# Patient Record
Sex: FEMALE | Race: WHITE | NOT HISPANIC OR LATINO | Employment: FULL TIME | URBAN - METROPOLITAN AREA
[De-identification: names, ages, dates, MRNs, and addresses within clinical notes are randomized per-mention and may not be internally consistent; named-entity substitution may affect disease eponyms.]

---

## 2017-03-09 ENCOUNTER — HOSPITAL ENCOUNTER (OUTPATIENT)
Dept: OTHER | Facility: HOSPITAL | Age: 42
Setting detail: SPECIMEN
Discharge: HOME | End: 2017-03-09
Attending: NURSE PRACTITIONER | Admitting: NURSE PRACTITIONER

## 2017-10-19 ENCOUNTER — HOSPITAL ENCOUNTER (OUTPATIENT)
Dept: CARDIOLOGY | Facility: HOSPITAL | Age: 42
Discharge: HOME | End: 2017-10-19
Attending: INTERNAL MEDICINE | Admitting: INTERNAL MEDICINE

## 2018-01-02 ENCOUNTER — HOSPITAL ENCOUNTER (OUTPATIENT)
Dept: CARDIOLOGY | Facility: HOSPITAL | Age: 43
Discharge: HOME | End: 2018-01-02
Attending: INTERNAL MEDICINE | Admitting: INTERNAL MEDICINE

## 2019-03-14 ENCOUNTER — HOSPITAL ENCOUNTER (OUTPATIENT)
Dept: LAB | Facility: HOSPITAL | Age: 44
Discharge: HOME | End: 2019-03-14
Attending: NURSE PRACTITIONER | Admitting: NURSE PRACTITIONER

## 2019-03-14 LAB
ANION GAP SERPL CALC-SCNC: 13.8 MMOL/L (ref 10–20)
BUN SERPL-MCNC: 14 MG/DL (ref 8–20)
BUN/CREAT SERPL: 17.5 (ref 5.4–26.2)
CALCIUM SERPL-MCNC: 9.6 MG/DL (ref 8.9–10.3)
CHLORIDE SERPL-SCNC: 98 MMOL/L (ref 101–111)
CONV CO2: 26 MMOL/L (ref 22–32)
CREAT UR-MCNC: 0.8 MG/DL (ref 0.4–1)
GLUCOSE SERPL-MCNC: 106 MG/DL (ref 65–99)
POTASSIUM SERPL-SCNC: 3.8 MMOL/L (ref 3.6–5.1)
SODIUM SERPL-SCNC: 134 MMOL/L (ref 136–144)

## 2019-08-01 ENCOUNTER — OFFICE VISIT (OUTPATIENT)
Dept: FAMILY MEDICINE CLINIC | Facility: CLINIC | Age: 44
End: 2019-08-01

## 2019-08-01 VITALS
HEART RATE: 90 BPM | RESPIRATION RATE: 12 BRPM | DIASTOLIC BLOOD PRESSURE: 82 MMHG | BODY MASS INDEX: 41.78 KG/M2 | WEIGHT: 243.4 LBS | SYSTOLIC BLOOD PRESSURE: 138 MMHG

## 2019-08-01 DIAGNOSIS — R60.9 PERIPHERAL EDEMA: Primary | ICD-10-CM

## 2019-08-01 DIAGNOSIS — E66.01 CLASS 3 SEVERE OBESITY DUE TO EXCESS CALORIES WITH SERIOUS COMORBIDITY AND BODY MASS INDEX (BMI) OF 40.0 TO 44.9 IN ADULT (HCC): ICD-10-CM

## 2019-08-01 DIAGNOSIS — I10 ESSENTIAL HYPERTENSION: ICD-10-CM

## 2019-08-01 PROBLEM — E78.5 HYPERLIPIDEMIA: Status: ACTIVE | Noted: 2017-10-30

## 2019-08-01 PROBLEM — R94.39 ABNORMAL CARDIOVASCULAR STRESS TEST: Status: ACTIVE | Noted: 2017-10-30

## 2019-08-01 PROBLEM — R60.0 PERIPHERAL EDEMA: Status: ACTIVE | Noted: 2019-03-14

## 2019-08-01 PROBLEM — F41.9 ANXIETY: Status: ACTIVE | Noted: 2017-10-30

## 2019-08-01 PROBLEM — G47.33 OBSTRUCTIVE SLEEP APNEA: Status: ACTIVE | Noted: 2019-04-10

## 2019-08-01 PROBLEM — E66.813 CLASS 3 SEVERE OBESITY DUE TO EXCESS CALORIES WITH SERIOUS COMORBIDITY IN ADULT: Status: ACTIVE | Noted: 2019-08-01

## 2019-08-01 PROCEDURE — 99213 OFFICE O/P EST LOW 20 MIN: CPT | Performed by: FAMILY MEDICINE

## 2019-08-01 RX ORDER — OMEPRAZOLE 20 MG/1
TABLET, DELAYED RELEASE ORAL EVERY 24 HOURS
COMMUNITY
Start: 2019-04-10 | End: 2019-08-01

## 2019-08-01 RX ORDER — ERGOCALCIFEROL 1.25 MG/1
CAPSULE ORAL
Refills: 0 | COMMUNITY
Start: 2019-06-05 | End: 2019-08-01

## 2019-08-01 RX ORDER — ARIPIPRAZOLE 20 MG/1
TABLET ORAL EVERY 24 HOURS
COMMUNITY
Start: 2012-06-02 | End: 2020-01-27

## 2019-08-01 RX ORDER — TRIAMCINOLONE ACETONIDE 55 UG/1
SPRAY, METERED NASAL
Refills: 2 | COMMUNITY
Start: 2019-05-14 | End: 2019-08-01

## 2019-08-01 RX ORDER — ATORVASTATIN CALCIUM 20 MG/1
TABLET, FILM COATED ORAL EVERY 24 HOURS
COMMUNITY
Start: 2018-01-18 | End: 2020-01-09

## 2019-08-01 RX ORDER — BROMPHENIRAMINE MALEATE, PSEUDOEPHEDRINE HYDROCHLORIDE, AND DEXTROMETHORPHAN HYDROBROMIDE 2; 30; 10 MG/5ML; MG/5ML; MG/5ML
SYRUP ORAL
Refills: 0 | COMMUNITY
Start: 2019-05-14 | End: 2019-08-01

## 2019-08-01 RX ORDER — CLONAZEPAM 0.5 MG/1
TABLET ORAL
Refills: 5 | COMMUNITY
Start: 2019-04-29

## 2019-08-01 RX ORDER — ALBUTEROL SULFATE 90 UG/1
AEROSOL, METERED RESPIRATORY (INHALATION)
Refills: 0 | COMMUNITY
Start: 2019-05-15 | End: 2019-08-01

## 2019-08-01 RX ORDER — FUROSEMIDE 20 MG/1
TABLET ORAL EVERY 24 HOURS
COMMUNITY
Start: 2019-04-23 | End: 2019-08-01

## 2019-08-01 RX ORDER — POTASSIUM CHLORIDE 750 MG/1
1 CAPSULE, EXTENDED RELEASE ORAL EVERY 24 HOURS
COMMUNITY
Start: 2019-04-23 | End: 2019-08-01

## 2019-08-01 RX ORDER — CLOMIPRAMINE HYDROCHLORIDE 50 MG/1
CAPSULE ORAL
COMMUNITY
Start: 2012-06-02 | End: 2021-02-08 | Stop reason: SDUPTHER

## 2019-08-01 RX ORDER — LEVOTHYROXINE SODIUM 0.07 MG/1
TABLET ORAL EVERY 24 HOURS
COMMUNITY
Start: 2019-04-10 | End: 2020-05-27

## 2019-08-01 RX ORDER — LAMOTRIGINE 100 MG/1
TABLET ORAL EVERY 24 HOURS
COMMUNITY
Start: 2012-06-02 | End: 2020-10-16

## 2019-08-01 RX ORDER — OXCARBAZEPINE 600 MG/1
600 TABLET, FILM COATED ORAL 2 TIMES DAILY
COMMUNITY
Start: 2018-01-18 | End: 2021-12-08

## 2019-08-01 RX ORDER — LOSARTAN POTASSIUM 100 MG/1
TABLET ORAL EVERY 24 HOURS
COMMUNITY
Start: 2019-04-10 | End: 2019-12-30 | Stop reason: SDUPTHER

## 2019-08-01 NOTE — PATIENT INSTRUCTIONS
Exercising to Stay Healthy  To become healthy and stay healthy, it is recommended that you do moderate-intensity and vigorous-intensity exercise. You can tell that you are exercising at a moderate intensity if your heart starts beating faster and you start breathing faster but can still hold a conversation. You can tell that you are exercising at a vigorous intensity if you are breathing much harder and faster and cannot hold a conversation while exercising.  Exercising regularly is important. It has many health benefits, such as:  · Improving overall fitness, flexibility, and endurance.  · Increasing bone density.  · Helping with weight control.  · Decreasing body fat.  · Increasing muscle strength.  · Reducing stress and tension.  · Improving overall health.  How often should I exercise?  Choose an activity that you enjoy, and set realistic goals. Your health care provider can help you make an activity plan that works for you.  Exercise regularly as told by your health care provider. This may include:  · Doing strength training two times a week, such as:  ? Lifting weights.  ? Using resistance bands.  ? Push-ups.  ? Sit-ups.  ? Yoga.  · Doing a certain intensity of exercise for a given amount of time. Choose from these options:  ? A total of 150 minutes of moderate-intensity exercise every week.  ? A total of 75 minutes of vigorous-intensity exercise every week.  ? A mix of moderate-intensity and vigorous-intensity exercise every week.  Children, pregnant women, people who have not exercised regularly, people who are overweight, and older adults may need to talk with a health care provider about what activities are safe to do. If you have a medical condition, be sure to talk with your health care provider before you start a new exercise program.  What are some exercise ideas?  Moderate-intensity exercise ideas include:  · Walking 1 mile (1.6 km) in about 15  minutes.  · Biking.  · Hiking.  · Golfing.  · Dancing.  · Water aerobics.  Vigorous-intensity exercise ideas include:  · Walking 4.5 miles (7.2 km) or more in about 1 hour.  · Jogging or running 5 miles (8 km) in about 1 hour.  · Biking 10 miles (16.1 km) or more in about 1 hour.  · Lap swimming.  · Roller-skating or in-line skating.  · Cross-country skiing.  · Vigorous competitive sports, such as football, basketball, and soccer.  · Jumping rope.  · Aerobic dancing.  What are some everyday activities that can help me to get exercise?  · Yard work, such as:  ? Pushing a .  ? Raking and bagging leaves.  · Washing your car.  · Pushing a stroller.  · Shoveling snow.  · Gardening.  · Washing windows or floors.  How can I be more active in my day-to-day activities?  · Use stairs instead of an elevator.  · Take a walk during your lunch break.  · If you drive, park your car farther away from your work or school.  · If you take public transportation, get off one stop early and walk the rest of the way.  · Stand up or walk around during all of your indoor phone calls.  · Get up, stretch, and walk around every 30 minutes throughout the day.  · Enjoy exercise with a friend. Support to continue exercising will help you keep a regular routine of activity.  What guidelines can I follow while exercising?  · Before you start a new exercise program, talk with your health care provider.  · Do not exercise so much that you hurt yourself, feel dizzy, or get very short of breath.  · Wear comfortable clothes and wear shoes with good support.  · Drink plenty of water while you exercise to prevent dehydration or heat stroke.  · Work out until your breathing and your heartbeat get faster.  Where to find more information  · [place] Department of Health and Human Services: www.hhs.gov  · Centers for Disease Control and Prevention (CDC): www.cdc.gov  Summary  · Exercising regularly is important. It will improve your overall fitness,  flexibility, and endurance.  · Regular exercise also will improve your overall health. It can help you control your weight, reduce stress, and improve your bone density.  · Do not exercise so much that you hurt yourself, feel dizzy, or get very short of breath.  · Before you start a new exercise program, talk with your health care provider.  This information is not intended to replace advice given to you by your health care provider. Make sure you discuss any questions you have with your health care provider.  Document Released: 01/20/2012 Document Revised: 11/08/2018 Document Reviewed: 11/08/2018  [employer] Interactive Patient Education © 2019 [employer]    Mediterranean Diet  A Mediterranean diet refers to food and lifestyle choices that are based on the traditions of countries located on the [place]. This way of eating has been shown to help prevent certain conditions and improve outcomes for people who have chronic diseases, like kidney disease and heart disease.  What are tips for following this plan?  Lifestyle  · Cook and eat meals together with your family, when possible.  · Drink enough fluid to keep your urine clear or pale yellow.  · Be physically active every day. This includes:  ? Aerobic exercise like running or swimming.  ? Leisure activities like gardening, walking, or housework.  · Get 7-8 hours of sleep each night.  · If recommended by your health care provider, drink red wine in moderation. This means 1 glass a day for nonpregnant women and 2 glasses a day for men. A glass of wine equals 5 oz (150 mL).  Reading food labels    · Check the serving size of packaged foods. For foods such as rice and pasta, the serving size refers to the amount of cooked product, not dry.  · Check the total fat in packaged foods. Avoid foods that have saturated fat or trans fats.  · Check the ingredients list for added sugars, such as corn syrup.  Shopping  · At the grocery store, buy most of your food from  the areas near the walls of the store. This includes:  ? Fresh fruits and vegetables (produce).  ? Grains, beans, nuts, and seeds. Some of these may be available in unpackaged forms or large amounts (in bulk).  ? Fresh seafood.  ? Poultry and eggs.  ? Low-fat dairy products.  · Buy whole ingredients instead of prepackaged foods.  · Buy fresh fruits and vegetables in-season from local farmers markets.  · Buy frozen fruits and vegetables in resealable bags.  · If you do not have access to quality fresh seafood, buy precooked frozen shrimp or canned fish, such as tuna, salmon, or sardines.  · Buy small amounts of raw or cooked vegetables, salads, or olives from the deli or salad bar at your store.  · Stock your pantry so you always have certain foods on hand, such as olive oil, canned tuna, canned tomatoes, rice, pasta, and beans.  Cooking  · Cook foods with extra-virgin olive oil instead of using butter or other vegetable oils.  · Have meat as a side dish, and have vegetables or grains as your main dish. This means having meat in small portions or adding small amounts of meat to foods like pasta or stew.  · Use beans or vegetables instead of meat in common dishes like chili or lasagna.  · [place] with different cooking methods. Try roasting or broiling vegetables instead of steaming or sautéeing them.  · Add frozen vegetables to soups, stews, pasta, or rice.  · Add nuts or seeds for added healthy fat at each meal. You can add these to yogurt, salads, or vegetable dishes.  · Marinate fish or vegetables using olive oil, lemon juice, garlic, and fresh herbs.  Meal planning    · Plan to eat 1 vegetarian meal one day each week. Try to work up to 2 vegetarian meals, if possible.  · Eat seafood 2 or more times a week.  · Have healthy snacks readily available, such as:  ? Vegetable sticks with hummus.  ? Greek yogurt.  ? Fruit and nut trail mix.  · Eat balanced meals throughout the week. This includes:  ? Fruit: 2-3  servings a day  ? Vegetables: 4-5 servings a day  ? Low-fat dairy: 2 servings a day  ? Fish, poultry, or lean meat: 1 serving a day  ? Beans and legumes: 2 or more servings a week  ? Nuts and seeds: 1-2 servings a day  ? Whole grains: 6-8 servings a day  ? Extra-virgin olive oil: 3-4 servings a day  · Limit red meat and sweets to only a few servings a month  What are my food choices?  · Mediterranean diet  ? Recommended  ? Grains: Whole-grain pasta. Brown rice. Bulgar wheat. Polenta. Couscous. Whole-wheat bread. Oatmeal. Quinoa.  ? Vegetables: Artichokes. Beets. Broccoli. Cabbage. Carrots. Eggplant. Green beans. Chard. Kale. Spinach. Onions. Leeks. Peas. Squash. Tomatoes. Peppers. Radishes.  ? Fruits: Apples. Apricots. Avocado. Berries. Bananas. Cherries. Dates. Figs. Grapes. [place]. Melon. Oranges. Peaches. Plums. Pomegranate.  ? Meats and other protein foods: Beans. Almonds. Sunflower seeds. Pine nuts. Peanuts. Cod. [place]. Scallops. Shrimp. Tuna. Tilapia. Clams. Oysters. Eggs.  ? Dairy: Low-fat milk. Cheese. Greek yogurt.  ? Beverages: Water. Red wine. Herbal tea.  ? Fats and oils: Extra virgin olive oil. Avocado oil. Grape seed oil.  ? Sweets and desserts: Greek yogurt with honey. Baked apples. Poached pears. Trail mix.  ? Seasoning and other foods: Basil. Cilantro. Coriander. Cumin. Mint. Parsley. [place]. Rosemary. Tarragon. Garlic. Oregano. Thyme. Pepper. Balsalmic vinegar. Tahini. Hummus. Tomato sauce. Olives. Mushrooms.  ? Limit these  ? Grains: Prepackaged pasta or rice dishes. Prepackaged cereal with added sugar.  ? Vegetables: Deep fried potatoes (french fries).  ? Fruits: Fruit canned in syrup.  ? Meats and other protein foods: Beef. Pork. Lamb. Poultry with skin. Hot dogs. Kim.  ? Dairy: Ice cream. Sour cream. Whole milk.  ? Beverages: Juice. Sugar-sweetened soft drinks. Beer. Liquor and spirits.  ? Fats and oils: Butter. Canola oil. Vegetable oil. Beef fat (tallow). Lard.  ? Sweets and desserts:  Cookies. Cakes. Pies. Candy.  ? Seasoning and other foods: Mayonnaise. Premade sauces and marinades.  ? The items listed may not be a complete list. Talk with your dietitian about what dietary choices are right for you.  Summary  · The Mediterranean diet includes both food and lifestyle choices.  · Eat a variety of fresh fruits and vegetables, beans, nuts, seeds, and whole grains.  · Limit the amount of red meat and sweets that you eat.  · Talk with your health care provider about whether it is safe for you to drink red wine in moderation. This means 1 glass a day for nonpregnant women and 2 glasses a day for men. A glass of wine equals 5 oz (150 mL).  This information is not intended to replace advice given to you by your health care provider. Make sure you discuss any questions you have with your health care provider.  Document Released: 08/10/2017 Document Revised: 09/12/2017 Document Reviewed: 08/10/2017  [employer] Interactive Patient Education © 2019 [employer]

## 2019-08-01 NOTE — ASSESSMENT & PLAN NOTE
Hypertension is improving with treatment.  Continue current treatment regimen.  Dietary sodium restriction.  Weight loss.  Continue current medications.  Blood pressure will be reassessed at the next regular appointment.

## 2019-08-01 NOTE — PROGRESS NOTES
Rooming Tab(CC,VS,Pt Hx,Fall Screen)  Chief Complaint   Patient presents with   • Hypertension   • Hyperlipidemia       Subjective Patient is here for follow-up of her edema.  20 mg of Lasix did not help her at all.  She wore compression socks knee-high and it seemed to help some but she got some pockets of fluid around part of her legs.  She denies orthopnea or PND.  She wears her CPAP but remains fatigued.  She denies any chest pain.  She is anxious.  Her psychiatrist is adjusting her medicines to Vraylar and weaning her off of Abilify.    I have reviewed and updated her medications, medical history and problem list during today's office visit.     Patient Care Team:  Salvador Connelly MD as PCP - General    Problem List Tab  Medications Tab  Synopsis Tab  Chart Review Tab  Care Everywhere Tab  Immunizations Tab  Patient History Tab    Social History     Tobacco Use   • Smoking status: Never Smoker   • Smokeless tobacco: Never Used   Substance Use Topics   • Alcohol use: Yes     Frequency: Monthly or less       Review of Systems   Constitutional: Positive for fatigue. Negative for chills and fever.   HENT: Positive for nosebleeds.    Respiratory: Negative for chest tightness and shortness of breath.    Cardiovascular: Positive for palpitations. Negative for chest pain.   Genitourinary: Negative for dysuria and hematuria.   Neurological: Negative for dizziness and syncope.   Psychiatric/Behavioral: Positive for depressed mood. Negative for suicidal ideas. The patient is nervous/anxious.        Objective     Rooming Tab(CC,VS,Pt Hx,Fall Screen)  /82 (BP Location: Right arm, Patient Position: Sitting, Cuff Size: Large Adult)   Pulse 90   Resp 12   Wt 110 kg (243 lb 6.4 oz)   BMI 41.78 kg/m²     Body mass index is 41.78 kg/m².    Physical Exam   Constitutional: She is oriented to person, place, and time. She appears well-developed and well-nourished. She is cooperative.   Obese pleasant white female in no  acute distress   HENT:   Head: Normocephalic.   Eyes: Conjunctivae and EOM are normal. Pupils are equal, round, and reactive to light.   Neck: Trachea normal and normal range of motion. Neck supple. No hepatojugular reflux and no JVD present. Carotid bruit is not present. No thyroid mass and no thyromegaly present.   Cardiovascular: Normal rate, regular rhythm, normal heart sounds, intact distal pulses and normal pulses.   No murmur heard.  Pulmonary/Chest: Effort normal and breath sounds normal. She exhibits no tenderness.   Abdominal: Soft. Normal appearance and bowel sounds are normal. There is no hepatosplenomegaly. There is no tenderness.   Musculoskeletal: Normal range of motion. She exhibits no edema.   She has no edema at this time   Neurological: She is alert and oriented to person, place, and time. She has normal strength and normal reflexes. No cranial nerve deficit or sensory deficit. She displays a negative Romberg sign.   Skin: Skin is warm and dry. Turgor is normal.   Psychiatric: She has a normal mood and affect. Her speech is normal and behavior is normal. Judgment and thought content normal. Cognition and memory are normal.   Nursing note and vitals reviewed.       Statin Choice Calculator  Data Reviewed:                   Assessment/Plan   Order Review Tab  Health Maintenance Tab  Patient Plan/Order Tab  Diagnoses and all orders for this visit:    1. Peripheral edema (Primary)  Assessment & Plan:  2nd venous insuff she needs to wear her knee Jobst stockings, elevate legs, weight loss      2. Essential hypertension  Assessment & Plan:  Hypertension is improving with treatment.  Continue current treatment regimen.  Dietary sodium restriction.  Weight loss.  Continue current medications.  Blood pressure will be reassessed at the next regular appointment.      3. Class 3 severe obesity due to excess calories with serious comorbidity and body mass index (BMI) of 40.0 to 44.9 in adult  (CMS/[facility])  Assessment & Plan:  Obesity is worsening.  Discussed the patient's BMI.  The BMI is above average; BMI management plan is completed.  Diet interventions: diet diary for the following I recommended she join weight watchers or HMR.  Informal exercise measures discussed, e.g. taking stairs instead of elevator.  Regular aerobic exercise program discussed.        Wrapup Tab  Return in about 3 months (around 11/1/2019) for Recheck.

## 2019-08-01 NOTE — ASSESSMENT & PLAN NOTE
Obesity is worsening.  Discussed the patient's BMI.  The BMI is above average; BMI management plan is completed.  Diet interventions: diet diary for the following I recommended she join weight watchers or HMR.  Informal exercise measures discussed, e.g. taking stairs instead of elevator.  Regular aerobic exercise program discussed.

## 2019-12-30 ENCOUNTER — TELEPHONE (OUTPATIENT)
Dept: FAMILY MEDICINE CLINIC | Facility: CLINIC | Age: 44
End: 2019-12-30

## 2019-12-30 RX ORDER — LOSARTAN POTASSIUM 100 MG/1
100 TABLET ORAL EVERY 24 HOURS
Qty: 90 TABLET | Refills: 3 | Status: SHIPPED | OUTPATIENT
Start: 2019-12-30 | End: 2020-12-27

## 2019-12-30 NOTE — TELEPHONE ENCOUNTER
Pt states you told her when she's ready to get RF of her Losartan to call you and you would send this in. She took her last one last night. She's on 100mg 1 po qd. Thank you.

## 2020-01-09 RX ORDER — ATORVASTATIN CALCIUM 20 MG/1
TABLET, FILM COATED ORAL
Qty: 90 TABLET | Refills: 1 | Status: SHIPPED | OUTPATIENT
Start: 2020-01-09 | End: 2020-07-21

## 2020-01-27 ENCOUNTER — OFFICE VISIT (OUTPATIENT)
Dept: FAMILY MEDICINE CLINIC | Facility: CLINIC | Age: 45
End: 2020-01-27

## 2020-01-27 VITALS
RESPIRATION RATE: 12 BRPM | BODY MASS INDEX: 42.19 KG/M2 | SYSTOLIC BLOOD PRESSURE: 130 MMHG | HEART RATE: 96 BPM | WEIGHT: 245.8 LBS | DIASTOLIC BLOOD PRESSURE: 81 MMHG

## 2020-01-27 DIAGNOSIS — E66.01 CLASS 3 SEVERE OBESITY DUE TO EXCESS CALORIES WITH SERIOUS COMORBIDITY AND BODY MASS INDEX (BMI) OF 40.0 TO 44.9 IN ADULT (HCC): ICD-10-CM

## 2020-01-27 DIAGNOSIS — I10 ESSENTIAL HYPERTENSION: ICD-10-CM

## 2020-01-27 DIAGNOSIS — E78.00 PURE HYPERCHOLESTEROLEMIA: ICD-10-CM

## 2020-01-27 DIAGNOSIS — I27.20 PULMONARY HYPERTENSION (HCC): ICD-10-CM

## 2020-01-27 DIAGNOSIS — R60.9 PERIPHERAL EDEMA: ICD-10-CM

## 2020-01-27 DIAGNOSIS — R60.1 GENERALIZED EDEMA: ICD-10-CM

## 2020-01-27 DIAGNOSIS — G47.33 OBSTRUCTIVE SLEEP APNEA: Primary | ICD-10-CM

## 2020-01-27 PROCEDURE — 99214 OFFICE O/P EST MOD 30 MIN: CPT | Performed by: FAMILY MEDICINE

## 2020-01-27 RX ORDER — FLUTICASONE PROPIONATE 50 MCG
2 SPRAY, SUSPENSION (ML) NASAL DAILY
Qty: 1 BOTTLE | Refills: 11 | Status: SHIPPED | OUTPATIENT
Start: 2020-01-27 | End: 2020-01-27

## 2020-01-27 RX ORDER — FLUTICASONE PROPIONATE 50 MCG
SPRAY, SUSPENSION (ML) NASAL
Qty: 48 G | Refills: 1 | Status: SHIPPED | OUTPATIENT
Start: 2020-01-27

## 2020-01-27 RX ORDER — CARIPRAZINE 3 MG/1
CAPSULE, GELATIN COATED ORAL
COMMUNITY
Start: 2020-01-20

## 2020-01-27 NOTE — PROGRESS NOTES
Rooming Tab(CC,VS,Pt Hx,Fall Screen)  Chief Complaint   Patient presents with   • Joint Swelling     The patient is here for ankle swelling        Subjective 44-year-old here for follow-up complaining of her ankle still swelling.  She has not lost any weight.  She does not take any diuretics.  Her legs have been swelling since I saw her about a year ago.  She has severe obstructive sleep apnea and does use her CPAP.  She also has hypothyroidism and needs to have her TFTs checked.  Despite her use of CPAP appropriately, she continues to complain of fatigue and hypersomnia.  She wears her CPAP at least 6 hours a night most nights and sometimes more.  She has no orthopnea or PND.  She has not had an echocardiogram to assess her right ventricular pressures.  She does have a history of bipolar disorder and is seeing a psychiatrist for this and on several medications.  He feels like this is fairly well under control.  Her fatigue persist however.    I have reviewed and updated her medications, medical history and problem list during today's office visit.     Patient Care Team:  Salvador Connelly MD as PCP - General    Problem List Tab  Medications Tab  Synopsis Tab  Chart Review Tab  Care Everywhere Tab  Immunizations Tab  Patient History Tab    Social History     Tobacco Use   • Smoking status: Never Smoker   • Smokeless tobacco: Never Used   Substance Use Topics   • Alcohol use: Yes     Frequency: Monthly or less       Review of Systems   Constitutional: Positive for fatigue. Negative for chills and fever.   HENT: Negative for nosebleeds.    Eyes: Negative for double vision.   Respiratory: Negative for chest tightness and shortness of breath.    Cardiovascular: Positive for leg swelling. Negative for chest pain and palpitations.   Gastrointestinal: Negative for blood in stool.   Genitourinary: Negative for dysuria and hematuria.   Neurological: Negative for dizziness and syncope.   Psychiatric/Behavioral: Negative for  depressed mood.       Objective     Rooming Tab(CC,VS,Pt Hx,Fall Screen)  /81 (BP Location: Right arm, Patient Position: Sitting, Cuff Size: Large Adult)   Pulse 96   Resp 12   Wt 111 kg (245 lb 12.8 oz)   BMI 42.19 kg/m²     Body mass index is 42.19 kg/m².    Physical Exam   Constitutional: She is oriented to person, place, and time. She appears well-developed and well-nourished. No distress.   Obese pleasant no distress   HENT:   Head: Normocephalic and atraumatic.   Nose: Nose normal.   Mouth/Throat: Oropharynx is clear and moist.   Eyes: Pupils are equal, round, and reactive to light. Conjunctivae, EOM and lids are normal.   Neck: Trachea normal and normal range of motion. Neck supple. No JVD present. Carotid bruit is not present. No thyroid mass and no thyromegaly present.   No carotid bruits   Cardiovascular: Normal rate, regular rhythm, normal heart sounds and intact distal pulses.   Pulmonary/Chest: Effort normal and breath sounds normal.   Musculoskeletal:   She has a trace of edema in her lower legs.  No clubbing or cyanosis   Neurological: She is alert and oriented to person, place, and time. No cranial nerve deficit.   Skin: Skin is warm and dry.   Psychiatric: She has a normal mood and affect. Her speech is normal and behavior is normal. She is attentive.   Nursing note and vitals reviewed.       Statin Choice Calculator  Data Reviewed:                   Assessment/Plan   Order Review Tab  Health Maintenance Tab  Patient Plan/Order Tab  Diagnoses and all orders for this visit:    1. Obstructive sleep apnea (Primary)  Assessment & Plan:  Seems to be relatively stable.  She may benefit from Vyvanse or Adderall in the future although this would have to be cleared with her psychiatrist     Orders:  -     Adult Transthoracic Echo Complete W/ Cont if Necessary Per Protocol; Future    2. Pulmonary hypertension (CMS/HCC)  -     Adult Transthoracic Echo Complete W/ Cont if Necessary Per Protocol;  Future    3. Generalized edema  -     Adult Transthoracic Echo Complete W/ Cont if Necessary Per Protocol; Future    4. Essential hypertension  Assessment & Plan:  Hypertension is improving with treatment.  Continue current treatment regimen.  Dietary sodium restriction.  Weight loss.  Regular aerobic exercise.  Continue current medications.  Blood pressure will be reassessed at the next regular appointment.      5. Pure hypercholesterolemia  Assessment & Plan:  Lipid abnormalities are unchanged.  Nutritional counseling was provided. and Pharmacotherapy as ordered.  Lipids will be reassessed in 3 months.      6. Peripheral edema  Assessment & Plan:  With her history of severe sleep apnea, hypertension, will check an echo, would like to evaluate for pulmonary hypertension      7. Class 3 severe obesity due to excess calories with serious comorbidity and body mass index (BMI) of 40.0 to 44.9 in adult (CMS/[facility])  Assessment & Plan:  Obesity is unchanged.  Discussed the patient's BMI.  The BMI is above average; BMI management plan is completed.  General weight loss/lifestyle modification strategies discussed (elicit support from others; identify saboteurs; non-food rewards, etc).  Regular aerobic exercise program discussed.      Other orders  -     Discontinue: fluticasone (FLONASE) 50 MCG/ACT nasal spray; 2 sprays into the nostril(s) as directed by provider Daily.  Dispense: 1 bottle; Refill: 11      Wrapup Tab  Return if symptoms worsen or fail to improve.     Gave patient order for CBC CMP lipid TSH free T4 total T3 and a urinalysis to do at [facility]

## 2020-01-28 ENCOUNTER — TELEPHONE (OUTPATIENT)
Dept: FAMILY MEDICINE CLINIC | Facility: CLINIC | Age: 45
End: 2020-01-28

## 2020-01-28 RX ORDER — AMOXICILLIN 875 MG/1
875 TABLET, COATED ORAL 2 TIMES DAILY
Qty: 20 TABLET | Refills: 0 | Status: SHIPPED | OUTPATIENT
Start: 2020-01-28 | End: 2020-10-16

## 2020-01-28 NOTE — TELEPHONE ENCOUNTER
Patient saw you yesterday and had a sore throat.  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  You said if she felt worse today you would call in an antibiotic.  She feels worse today.  Will you send in an antibiotic for her today?

## 2020-01-28 NOTE — ASSESSMENT & PLAN NOTE
Seems to be relatively stable.  She may benefit from Vyvanse or Adderall in the future although this would have to be cleared with her psychiatrist

## 2020-01-28 NOTE — ASSESSMENT & PLAN NOTE
With her history of severe sleep apnea, hypertension, will check an echo, would like to evaluate for pulmonary hypertension

## 2020-01-31 ENCOUNTER — APPOINTMENT (OUTPATIENT)
Dept: CARDIOLOGY | Facility: HOSPITAL | Age: 45
End: 2020-01-31

## 2020-02-01 ENCOUNTER — LAB (OUTPATIENT)
Dept: LAB | Facility: HOSPITAL | Age: 45
End: 2020-02-01

## 2020-02-01 ENCOUNTER — TRANSCRIBE ORDERS (OUTPATIENT)
Dept: ADMINISTRATIVE | Facility: HOSPITAL | Age: 45
End: 2020-02-01

## 2020-02-01 DIAGNOSIS — E78.5 HYPERLIPIDEMIA, UNSPECIFIED HYPERLIPIDEMIA TYPE: ICD-10-CM

## 2020-02-01 DIAGNOSIS — E03.9 HYPOTHYROIDISM, UNSPECIFIED TYPE: ICD-10-CM

## 2020-02-01 DIAGNOSIS — R60.9 EDEMA, UNSPECIFIED TYPE: Primary | ICD-10-CM

## 2020-02-01 DIAGNOSIS — R60.9 EDEMA, UNSPECIFIED TYPE: ICD-10-CM

## 2020-02-01 LAB
ALBUMIN SERPL-MCNC: 3.9 G/DL (ref 3.5–5.2)
ALBUMIN/GLOB SERPL: 1.5 G/DL
ALP SERPL-CCNC: 83 U/L (ref 39–117)
ALT SERPL W P-5'-P-CCNC: 25 U/L (ref 1–33)
ANION GAP SERPL CALCULATED.3IONS-SCNC: 11.5 MMOL/L (ref 5–15)
AST SERPL-CCNC: 19 U/L (ref 1–32)
BASOPHILS # BLD AUTO: 0.03 10*3/MM3 (ref 0–0.2)
BASOPHILS NFR BLD AUTO: 0.4 % (ref 0–1.5)
BILIRUB SERPL-MCNC: 0.3 MG/DL (ref 0.2–1.2)
BILIRUB UR QL STRIP: NEGATIVE
BUN BLD-MCNC: 10 MG/DL (ref 6–20)
BUN/CREAT SERPL: 14.1 (ref 7–25)
CALCIUM SPEC-SCNC: 8.5 MG/DL (ref 8.6–10.5)
CHLORIDE SERPL-SCNC: 98 MMOL/L (ref 98–107)
CHOLEST SERPL-MCNC: 128 MG/DL (ref 0–200)
CLARITY UR: CLEAR
CO2 SERPL-SCNC: 25.5 MMOL/L (ref 22–29)
COLOR UR: YELLOW
CREAT BLD-MCNC: 0.71 MG/DL (ref 0.57–1)
DEPRECATED RDW RBC AUTO: 40.7 FL (ref 37–54)
EOSINOPHIL # BLD AUTO: 0.15 10*3/MM3 (ref 0–0.4)
EOSINOPHIL NFR BLD AUTO: 2 % (ref 0.3–6.2)
ERYTHROCYTE [DISTWIDTH] IN BLOOD BY AUTOMATED COUNT: 12.5 % (ref 12.3–15.4)
GFR SERPL CREATININE-BSD FRML MDRD: 90 ML/MIN/1.73
GLOBULIN UR ELPH-MCNC: 2.6 GM/DL
GLUCOSE BLD-MCNC: 104 MG/DL (ref 65–99)
GLUCOSE UR STRIP-MCNC: NEGATIVE MG/DL
HCT VFR BLD AUTO: 36.8 % (ref 34–46.6)
HDLC SERPL-MCNC: 47 MG/DL (ref 40–60)
HGB BLD-MCNC: 12.1 G/DL (ref 12–15.9)
HGB UR QL STRIP.AUTO: NEGATIVE
IMM GRANULOCYTES # BLD AUTO: 0.02 10*3/MM3 (ref 0–0.05)
IMM GRANULOCYTES NFR BLD AUTO: 0.3 % (ref 0–0.5)
KETONES UR QL STRIP: NEGATIVE
LDLC SERPL CALC-MCNC: 70 MG/DL (ref 0–100)
LDLC/HDLC SERPL: 1.49 {RATIO}
LEUKOCYTE ESTERASE UR QL STRIP.AUTO: NEGATIVE
LYMPHOCYTES # BLD AUTO: 1.22 10*3/MM3 (ref 0.7–3.1)
LYMPHOCYTES NFR BLD AUTO: 16.3 % (ref 19.6–45.3)
MCH RBC QN AUTO: 29.9 PG (ref 26.6–33)
MCHC RBC AUTO-ENTMCNC: 32.9 G/DL (ref 31.5–35.7)
MCV RBC AUTO: 90.9 FL (ref 79–97)
MONOCYTES # BLD AUTO: 0.54 10*3/MM3 (ref 0.1–0.9)
MONOCYTES NFR BLD AUTO: 7.2 % (ref 5–12)
NEUTROPHILS # BLD AUTO: 5.53 10*3/MM3 (ref 1.7–7)
NEUTROPHILS NFR BLD AUTO: 73.8 % (ref 42.7–76)
NITRITE UR QL STRIP: NEGATIVE
NRBC BLD AUTO-RTO: 0 /100 WBC (ref 0–0.2)
PH UR STRIP.AUTO: 8.5 [PH] (ref 5–8)
PLATELET # BLD AUTO: 226 10*3/MM3 (ref 140–450)
PMV BLD AUTO: 10.5 FL (ref 6–12)
POTASSIUM BLD-SCNC: 3.9 MMOL/L (ref 3.5–5.2)
PROT SERPL-MCNC: 6.5 G/DL (ref 6–8.5)
PROT UR QL STRIP: NEGATIVE
RBC # BLD AUTO: 4.05 10*6/MM3 (ref 3.77–5.28)
SODIUM BLD-SCNC: 135 MMOL/L (ref 136–145)
SP GR UR STRIP: 1.02 (ref 1–1.03)
T3 SERPL-MCNC: 84.3 NG/DL (ref 80–200)
T4 FREE SERPL-MCNC: 0.92 NG/DL (ref 0.93–1.7)
TRIGL SERPL-MCNC: 54 MG/DL (ref 0–150)
TSH SERPL DL<=0.05 MIU/L-ACNC: 0.8 UIU/ML (ref 0.27–4.2)
UROBILINOGEN UR QL STRIP: ABNORMAL
VLDLC SERPL-MCNC: 10.8 MG/DL (ref 5–40)
WBC NRBC COR # BLD: 7.49 10*3/MM3 (ref 3.4–10.8)

## 2020-02-01 PROCEDURE — 36415 COLL VENOUS BLD VENIPUNCTURE: CPT

## 2020-02-01 PROCEDURE — 80061 LIPID PANEL: CPT

## 2020-02-01 PROCEDURE — 84480 ASSAY TRIIODOTHYRONINE (T3): CPT

## 2020-02-01 PROCEDURE — 84443 ASSAY THYROID STIM HORMONE: CPT

## 2020-02-01 PROCEDURE — 85025 COMPLETE CBC W/AUTO DIFF WBC: CPT

## 2020-02-01 PROCEDURE — 80053 COMPREHEN METABOLIC PANEL: CPT

## 2020-02-01 PROCEDURE — 84439 ASSAY OF FREE THYROXINE: CPT

## 2020-02-01 PROCEDURE — 81003 URINALYSIS AUTO W/O SCOPE: CPT

## 2020-05-08 ENCOUNTER — TELEMEDICINE - AUDIO (OUTPATIENT)
Dept: FAMILY MEDICINE CLINIC | Facility: CLINIC | Age: 45
End: 2020-05-08

## 2020-05-08 DIAGNOSIS — H53.9 VISUAL DISTURBANCES: Primary | ICD-10-CM

## 2020-05-08 PROCEDURE — 99442 PR PHYS/QHP TELEPHONE EVALUATION 11-20 MIN: CPT | Performed by: FAMILY MEDICINE

## 2020-05-08 NOTE — ASSESSMENT & PLAN NOTE
I am certain uncertain of the etiology to her visual disturbance.  I recommend she go back and see him, I also think we need to have an MRI of her brain, we will schedule this.  If this is negative I would do if no further work-up

## 2020-05-08 NOTE — PROGRESS NOTES
You have chosen to receive care through a telephone visit. Do you consent to use a telephone visit for your medical care today? Yes      Rooming Tab(CC,VS,Pt Hx,Fall Screen)  Chief Complaint   Patient presents with   • Decreased Visual Acuity       Subjective 44-year-old doing a telephone visit due to COVID-19 in her concern for the pandemic.  Patient states that for the last year she has been having episodes where when she closes her eyes to sleep she has 2 circles that are white in color that she sees.  This will last for several minutes or more and she has no associated headaches or other neurological symptoms.  The patient states more recently that she is now seeing these white circles when she just blinks and sometimes also during her eyes being open.  She has no associated diplopia or one-sided weakness or slurred speech.  She has been to her optometrist twice earlier for this and he found no issues.  She has no noted visual field defects.  I cannot find any other reason why she is having the symptoms.  She has no fever.  She has no chills.  She has no other neurological symptoms.  It is however progressing and is making her somewhat nervous.    I have reviewed and updated her medications, medical history and problem list during today's office visit.     Patient Care Team:  Salvador Connelly MD as PCP - General    Problem List Tab  Medications Tab  Synopsis Tab  Chart Review Tab  Care Everywhere Tab  Immunizations Tab  Patient History Tab    Social History     Tobacco Use   • Smoking status: Never Smoker   • Smokeless tobacco: Never Used   Substance Use Topics   • Alcohol use: Yes     Frequency: Monthly or less       Review of Systems   Constitutional: Negative for chills, fatigue and fever.   HENT: Negative for nosebleeds.    Eyes: Positive for visual disturbance. Negative for double vision.   Respiratory: Negative for chest tightness and shortness of breath.    Cardiovascular: Negative for chest pain and  palpitations.   Gastrointestinal: Negative for blood in stool.   Genitourinary: Negative for dysuria and hematuria.   Neurological: Negative for dizziness, syncope and headache.   Psychiatric/Behavioral: Negative for depressed mood.       Objective     Rooming Tab(CC,VS,Pt Hx,Fall Screen)  There were no vitals taken for this visit.    There is no height or weight on file to calculate BMI.    Physical Exam     Statin Choice Calculator  Data Reviewed:                   Assessment/Plan   Order Review Tab  Health Maintenance Tab  Patient Plan/Order Tab  Diagnoses and all orders for this visit:    1. Visual disturbances (Primary)  Assessment & Plan:  I am certain uncertain of the etiology to her visual disturbance.  I recommend she go back and see him, I also think we need to have an MRI of her brain, we will schedule this.  If this is negative I would do if no further work-up    Orders:  -     MRI Brain Without Contrast; Future      Wrapup Tab  Return if symptoms worsen or fail to improve.       Time and telephone visit 11 minutes

## 2020-05-13 ENCOUNTER — TELEPHONE (OUTPATIENT)
Dept: FAMILY MEDICINE CLINIC | Facility: CLINIC | Age: 45
End: 2020-05-13

## 2020-05-13 DIAGNOSIS — I27.20 PULMONARY HYPERTENSION (HCC): Primary | ICD-10-CM

## 2020-05-13 RX ORDER — DIAZEPAM 10 MG/1
TABLET ORAL
Qty: 1 TABLET | Refills: 0 | Status: SHIPPED | OUTPATIENT
Start: 2020-05-13 | End: 2020-05-19

## 2020-05-13 NOTE — TELEPHONE ENCOUNTER
Will give valium pre mri, not to take klonipin  I put in order for echo for pulmonary hypertension

## 2020-05-13 NOTE — TELEPHONE ENCOUNTER
Patient calling to see what Dr Connelly thought about a sedated MRI. She states her anxiety medication may not be enough to help her because she is claustrophobic.     Also calling to check on the order for what she was supposed to have on heart.    Please advise at [phone].

## 2020-05-19 ENCOUNTER — TELEPHONE (OUTPATIENT)
Dept: FAMILY MEDICINE CLINIC | Facility: CLINIC | Age: 45
End: 2020-05-19

## 2020-05-19 ENCOUNTER — HOSPITAL ENCOUNTER (OUTPATIENT)
Dept: CARDIOLOGY | Facility: HOSPITAL | Age: 45
Discharge: HOME | End: 2020-05-19
Admitting: FAMILY MEDICINE

## 2020-05-19 VITALS — HEIGHT: 64 IN | BODY MASS INDEX: 41.83 KG/M2 | WEIGHT: 245 LBS

## 2020-05-19 DIAGNOSIS — I27.20 PULMONARY HYPERTENSION (HCC): ICD-10-CM

## 2020-05-19 LAB
BH CV ECHO MEAS - AO MAX PG (FULL): 9.7 MMHG
BH CV ECHO MEAS - AO MAX PG: 16.6 MMHG
BH CV ECHO MEAS - AO MEAN PG (FULL): 4.9 MMHG
BH CV ECHO MEAS - AO MEAN PG: 8.4 MMHG
BH CV ECHO MEAS - AO ROOT AREA (BSA CORRECTED): 1.4
BH CV ECHO MEAS - AO ROOT AREA: 7.5 CM^2
BH CV ECHO MEAS - AO ROOT DIAM: 3.1 CM
BH CV ECHO MEAS - AO V2 MAX: 203.7 CM/SEC
BH CV ECHO MEAS - AO V2 MEAN: 134.6 CM/SEC
BH CV ECHO MEAS - AO V2 VTI: 35.7 CM
BH CV ECHO MEAS - AVA(I,A): 2.3 CM^2
BH CV ECHO MEAS - AVA(I,D): 2.3 CM^2
BH CV ECHO MEAS - AVA(V,A): 1.9 CM^2
BH CV ECHO MEAS - AVA(V,D): 1.9 CM^2
BH CV ECHO MEAS - BSA(HAYCOCK): 2.3 M^2
BH CV ECHO MEAS - BSA: 2.1 M^2
BH CV ECHO MEAS - BZI_BMI: 42.1 KILOGRAMS/M^2
BH CV ECHO MEAS - BZI_METRIC_HEIGHT: 162.6 CM
BH CV ECHO MEAS - BZI_METRIC_WEIGHT: 111.1 KG
BH CV ECHO MEAS - EDV(CUBED): 111.9 ML
BH CV ECHO MEAS - EDV(MOD-SP4): 115.2 ML
BH CV ECHO MEAS - EDV(TEICH): 108.5 ML
BH CV ECHO MEAS - EF(CUBED): 78.9 %
BH CV ECHO MEAS - EF(MOD-BP): 65 %
BH CV ECHO MEAS - EF(MOD-SP4): 71 %
BH CV ECHO MEAS - EF(TEICH): 71.1 %
BH CV ECHO MEAS - ESV(CUBED): 23.6 ML
BH CV ECHO MEAS - ESV(MOD-SP4): 33.4 ML
BH CV ECHO MEAS - ESV(TEICH): 31.4 ML
BH CV ECHO MEAS - FS: 40.4 %
BH CV ECHO MEAS - IVS/LVPW: 1
BH CV ECHO MEAS - IVSD: 1.4 CM
BH CV ECHO MEAS - LA DIMENSION: 4.1 CM
BH CV ECHO MEAS - LA/AO: 1.3
BH CV ECHO MEAS - LV DIASTOLIC VOL/BSA (35-75): 54 ML/M^2
BH CV ECHO MEAS - LV MASS(C)D: 255.7 GRAMS
BH CV ECHO MEAS - LV MASS(C)DI: 119.9 GRAMS/M^2
BH CV ECHO MEAS - LV MAX PG: 6.9 MMHG
BH CV ECHO MEAS - LV MEAN PG: 3.5 MMHG
BH CV ECHO MEAS - LV SYSTOLIC VOL/BSA (12-30): 15.7 ML/M^2
BH CV ECHO MEAS - LV V1 MAX: 131.4 CM/SEC
BH CV ECHO MEAS - LV V1 MEAN: 88 CM/SEC
BH CV ECHO MEAS - LV V1 VTI: 27.1 CM
BH CV ECHO MEAS - LVIDD: 4.8 CM
BH CV ECHO MEAS - LVIDS: 2.9 CM
BH CV ECHO MEAS - LVOT AREA: 3 CM^2
BH CV ECHO MEAS - LVOT DIAM: 2 CM
BH CV ECHO MEAS - LVPWD: 1.3 CM
BH CV ECHO MEAS - MR MAX PG: 52.1 MMHG
BH CV ECHO MEAS - MR MAX VEL: 361.1 CM/SEC
BH CV ECHO MEAS - MV A MAX VEL: 61.9 CM/SEC
BH CV ECHO MEAS - MV E MAX VEL: 93.7 CM/SEC
BH CV ECHO MEAS - MV E/A: 1.5
BH CV ECHO MEAS - MV MAX PG: 5.7 MMHG
BH CV ECHO MEAS - MV MEAN PG: 2.7 MMHG
BH CV ECHO MEAS - MV V2 MAX: 118.9 CM/SEC
BH CV ECHO MEAS - MV V2 MEAN: 76.5 CM/SEC
BH CV ECHO MEAS - MV V2 VTI: 23.8 CM
BH CV ECHO MEAS - MVA(VTI): 3.4 CM^2
BH CV ECHO MEAS - PA ACC TIME: 0.1 SEC
BH CV ECHO MEAS - PA MAX PG: 9.4 MMHG
BH CV ECHO MEAS - PA PR(ACCEL): 32.8 MMHG
BH CV ECHO MEAS - PA V2 MAX: 153.3 CM/SEC
BH CV ECHO MEAS - RAP SYSTOLE: 10 MMHG
BH CV ECHO MEAS - RVDD(2D): 3 CM
BH CV ECHO MEAS - RVDD: 3 CM
BH CV ECHO MEAS - RVSP: 55.7 MMHG
BH CV ECHO MEAS - SI(AO): 125 ML/M^2
BH CV ECHO MEAS - SI(CUBED): 41.4 ML/M^2
BH CV ECHO MEAS - SI(LVOT): 38.1 ML/M^2
BH CV ECHO MEAS - SI(MOD-SP4): 38.3 ML/M^2
BH CV ECHO MEAS - SI(TEICH): 36.2 ML/M^2
BH CV ECHO MEAS - SV(AO): 266.4 ML
BH CV ECHO MEAS - SV(CUBED): 88.3 ML
BH CV ECHO MEAS - SV(LVOT): 81.2 ML
BH CV ECHO MEAS - SV(MOD-SP4): 81.7 ML
BH CV ECHO MEAS - SV(TEICH): 77.1 ML
BH CV ECHO MEAS - TR MAX VEL: 314.3 CM/SEC
LV EF 2D ECHO EST: 65 %
MAXIMAL PREDICTED HEART RATE: 177 BPM
STRESS TARGET HR: 150 BPM

## 2020-05-19 PROCEDURE — 93306 TTE W/DOPPLER COMPLETE: CPT | Performed by: INTERNAL MEDICINE

## 2020-05-19 PROCEDURE — 93306 TTE W/DOPPLER COMPLETE: CPT

## 2020-05-19 NOTE — TELEPHONE ENCOUNTER
PT CALLED REQUESTING HER MRI RESULTS. MRI WAS COMPLETED ON 05/18.     PLEASE ADVISE     PT CALL BACK  [phone]

## 2020-05-20 DIAGNOSIS — H53.9 VISUAL DISTURBANCES: ICD-10-CM

## 2020-05-27 RX ORDER — LEVOTHYROXINE SODIUM 0.07 MG/1
TABLET ORAL
Qty: 90 TABLET | Refills: 1 | Status: SHIPPED | OUTPATIENT
Start: 2020-05-27 | End: 2020-11-23

## 2020-06-26 ENCOUNTER — OFFICE VISIT (OUTPATIENT)
Dept: FAMILY MEDICINE CLINIC | Facility: CLINIC | Age: 45
End: 2020-06-26

## 2020-06-26 DIAGNOSIS — M54.2 CERVICALGIA: Primary | ICD-10-CM

## 2020-06-26 PROCEDURE — 99213 OFFICE O/P EST LOW 20 MIN: CPT | Performed by: PHYSICIAN ASSISTANT

## 2020-06-26 RX ORDER — METHYLPREDNISOLONE 4 MG/1
TABLET ORAL
Qty: 1 EACH | Refills: 0 | Status: SHIPPED | OUTPATIENT
Start: 2020-06-26 | End: 2020-10-16

## 2020-06-26 NOTE — PROGRESS NOTES
"Subjective   Olga Garcia is a 44 y.o. female.     No chief complaint on file.      There were no vitals taken for this visit.    BP Readings from Last 3 Encounters:   01/27/20 130/81   08/01/19 138/82   04/10/19 128/84       Wt Readings from Last 3 Encounters:   05/19/20 111 kg (245 lb)   01/27/20 111 kg (245 lb 12.8 oz)   08/01/19 110 kg (243 lb 6.4 oz)       HPI patient I had a phone call to discuss cervical neck pain that is been present over the past 1 day.  She states that she was lying in bed when she heard a \"pop\" and then she began having pain in her upper back that radiated down to her lower back.  She states that the pain does not radiate into her arms or into her hands.  She denies numbness or tingling in her upper extremities.  She states that the pain is consistent with pain she has had in the past which she typically saw a chiropractor for.  She denies any numbness or tingling in her thoracic region.  She states that the pain is worse with movement.    The following portions of the patient's history were reviewed and updated as appropriate: allergies, current medications, past family history, past medical history, past social history, past surgical history and problem list.    Review of Systems   Constitutional: Negative for fatigue and fever.   Eyes: Negative for blurred vision and visual disturbance.   Respiratory: Negative for chest tightness and shortness of breath.    Cardiovascular: Negative for chest pain and leg swelling.   Musculoskeletal: Positive for arthralgias. Negative for back pain.        Cervical neck pain   Skin: Negative for rash and bruise.   Allergic/Immunologic: Negative for environmental allergies.   Neurological: Negative for headache and confusion.   Hematological: Negative for adenopathy. Does not bruise/bleed easily.   Psychiatric/Behavioral: Negative for stress. The patient is not nervous/anxious.        Objective   Physical Exam  Unable to perform a physical exam as " this was a phone encounter.    Diagnoses and all orders for this visit:    1. Cervicalgia (Primary)  -     methylPREDNISolone (MEDROL, ESTELLA,) 4 MG tablet; Take as directed on package instructions.  Dispense: 1 each; Refill: 0        Return if symptoms worsen or fail to improve.

## 2020-07-21 RX ORDER — ATORVASTATIN CALCIUM 20 MG/1
TABLET, FILM COATED ORAL
Qty: 90 TABLET | Refills: 1 | Status: SHIPPED | OUTPATIENT
Start: 2020-07-21 | End: 2021-01-15 | Stop reason: SDUPTHER

## 2020-10-16 ENCOUNTER — OFFICE VISIT (OUTPATIENT)
Dept: FAMILY MEDICINE CLINIC | Facility: CLINIC | Age: 45
End: 2020-10-16

## 2020-10-16 VITALS
HEIGHT: 64 IN | DIASTOLIC BLOOD PRESSURE: 82 MMHG | TEMPERATURE: 96.9 F | BODY MASS INDEX: 43.36 KG/M2 | WEIGHT: 254 LBS | HEART RATE: 104 BPM | OXYGEN SATURATION: 97 % | SYSTOLIC BLOOD PRESSURE: 132 MMHG | RESPIRATION RATE: 16 BRPM

## 2020-10-16 DIAGNOSIS — S40.022A HEMATOMA OF ARM, LEFT, INITIAL ENCOUNTER: Primary | ICD-10-CM

## 2020-10-16 PROCEDURE — 99213 OFFICE O/P EST LOW 20 MIN: CPT | Performed by: FAMILY MEDICINE

## 2020-10-16 RX ORDER — CARIPRAZINE 1.5 MG/1
CAPSULE, GELATIN COATED ORAL
COMMUNITY
Start: 2020-10-14 | End: 2021-01-12

## 2020-10-16 NOTE — PROGRESS NOTES
"Rooming Tab(CC,VS,Pt Hx,Fall Screen)  Chief Complaint   Patient presents with   • bump on arm     left       Subjective 45-year-old here for a bump on her left arm that she would like to have looked at.  She noticed this shortly after getting a flu shot.  She got her flu shot at [employer] and couple days later had significant severe swelling in her left upper arm where the shot was.  She states that it has gone down quite a bit and the shot was a few weeks ago but does not remember the exact date.  She wants to make sure it is okay.  She has no fever or chills.  She had no other injury.    I have reviewed and updated her medications, medical history and problem list during today's office visit.     Patient Care Team:  Salvador Connelly MD as PCP - General    Problem List Tab  Medications Tab  Synopsis Tab  Chart Review Tab  Care Everywhere Tab  Immunizations Tab  Patient History Tab    Social History     Tobacco Use   • Smoking status: Never Smoker   • Smokeless tobacco: Never Used   Substance Use Topics   • Alcohol use: Yes     Frequency: Monthly or less       Review of Systems   Constitutional: Negative for chills, fatigue and fever.   HENT: Negative for nosebleeds.    Eyes: Negative for blurred vision and double vision.   Cardiovascular: Negative for palpitations.   Genitourinary: Negative for dysuria and hematuria.   Neurological: Negative for dizziness and syncope.   Psychiatric/Behavioral: The patient is nervous/anxious.        Objective     Rooming Tab(CC,VS,Pt Hx,Fall Screen)  /82 (BP Location: Right arm)   Pulse 104   Temp 96.9 °F (36.1 °C)   Resp 16   Ht 162.6 cm (64\")   Wt 115 kg (254 lb)   SpO2 97%   BMI 43.60 kg/m²     Body mass index is 43.6 kg/m².    Physical Exam  Vitals signs and nursing note reviewed.   Constitutional:       General: She is not in acute distress.     Appearance: She is well-developed.      Comments: Obese pleasant female who is in no acute distress   HENT:      Head: " Normocephalic and atraumatic.      Right Ear: Tympanic membrane and ear canal normal.      Left Ear: Tympanic membrane and ear canal normal.      Nose: Nose normal.   Eyes:      General: Lids are normal.      Conjunctiva/sclera: Conjunctivae normal.      Pupils: Pupils are equal, round, and reactive to light.   Neck:      Musculoskeletal: Normal range of motion and neck supple.      Thyroid: No thyroid mass or thyromegaly.      Vascular: No carotid bruit or JVD.      Trachea: Trachea normal.      Comments: No carotid bruits  Cardiovascular:      Rate and Rhythm: Normal rate and regular rhythm.      Heart sounds: Normal heart sounds.   Pulmonary:      Effort: Pulmonary effort is normal.      Breath sounds: Normal breath sounds.   Musculoskeletal:      Comments: No c/c/e  Her left arm and the upper lateral arm is a hematoma that is several centimeters in diameter, it is nontender and there is no redness or warmth   Skin:     General: Skin is warm and dry.   Neurological:      General: No focal deficit present.      Mental Status: She is alert and oriented to person, place, and time.      Cranial Nerves: No cranial nerve deficit.   Psychiatric:         Attention and Perception: She is attentive.         Mood and Affect: Mood normal.         Speech: Speech normal.         Behavior: Behavior normal.          Statin Choice Calculator  Data Reviewed:                   Assessment/Plan   Order Review Tab  Health Maintenance Tab  Patient Plan/Order Tab  Diagnoses and all orders for this visit:    1. Hematoma of arm, left, initial encounter (Primary)  Assessment & Plan:  I discussed with the patient this is likely secondary to her flu shot that just happens on occasion.  Probably had a small leak in a blood vessel and it seems to be improving but it will take a couple of months to improve or maybe go away, she may also have a small nodule here that may never completely resolve but that would be okay as well.  I would not do  any other invasive treatment.  Follow-up as needed        Wrapup Tab  Return in about 3 months (around 1/16/2021) for Annual physical.

## 2020-10-19 PROBLEM — S40.022A HEMATOMA OF ARM, LEFT, INITIAL ENCOUNTER: Status: ACTIVE | Noted: 2020-10-19

## 2020-10-19 NOTE — ASSESSMENT & PLAN NOTE
I discussed with the patient this is likely secondary to her flu shot that just happens on occasion.  Probably had a small leak in a blood vessel and it seems to be improving but it will take a couple of months to improve or maybe go away, she may also have a small nodule here that may never completely resolve but that would be okay as well.  I would not do any other invasive treatment.  Follow-up as needed

## 2020-11-12 ENCOUNTER — TELEPHONE (OUTPATIENT)
Dept: FAMILY MEDICINE CLINIC | Facility: CLINIC | Age: 45
End: 2020-11-12

## 2020-11-12 RX ORDER — BENZONATATE 200 MG/1
200 CAPSULE ORAL 3 TIMES DAILY PRN
Qty: 30 CAPSULE | Refills: 1 | Status: SHIPPED | OUTPATIENT
Start: 2020-11-12 | End: 2021-01-12

## 2020-11-12 RX ORDER — METHYLPREDNISOLONE 4 MG/1
TABLET ORAL
Qty: 21 TABLET | Refills: 0 | Status: SHIPPED | OUTPATIENT
Start: 2020-11-12 | End: 2021-01-12

## 2020-11-12 RX ORDER — DOXYCYCLINE HYCLATE 100 MG/1
100 CAPSULE ORAL 2 TIMES DAILY
Qty: 20 CAPSULE | Refills: 0 | Status: SHIPPED | OUTPATIENT
Start: 2020-11-12 | End: 2021-01-12

## 2020-11-12 NOTE — TELEPHONE ENCOUNTER
I called in doxycycline, Medrol, and Tessalon, if she is not getting better she will need to be seen

## 2020-11-12 NOTE — TELEPHONE ENCOUNTER
Patient called and she has a really bad cough. Has had for several weeks. Got tested for covid and was negative and received an antibiotic. Pt has been taking coricidin and the cough isnt getting better. Has drainage.Please call back and advise at [phone].    Verified pharmacy-[id number] - [place], [place] - [address] - [phone] PH - [phone] FX  [phone]

## 2020-11-23 RX ORDER — LEVOTHYROXINE SODIUM 0.07 MG/1
TABLET ORAL
Qty: 90 TABLET | Refills: 1 | Status: SHIPPED | OUTPATIENT
Start: 2020-11-23 | End: 2021-04-05 | Stop reason: SDUPTHER

## 2020-12-27 RX ORDER — LOSARTAN POTASSIUM 100 MG/1
100 TABLET ORAL EVERY 24 HOURS
Qty: 90 TABLET | Refills: 3 | Status: SHIPPED | OUTPATIENT
Start: 2020-12-27 | End: 2021-09-28 | Stop reason: SDUPTHER

## 2021-01-12 ENCOUNTER — TELEPHONE (OUTPATIENT)
Dept: FAMILY MEDICINE CLINIC | Facility: CLINIC | Age: 46
End: 2021-01-12

## 2021-01-12 ENCOUNTER — OFFICE VISIT (OUTPATIENT)
Dept: CARDIOLOGY | Facility: CLINIC | Age: 46
End: 2021-01-12

## 2021-01-12 VITALS
BODY MASS INDEX: 45 KG/M2 | WEIGHT: 254 LBS | OXYGEN SATURATION: 97 % | RESPIRATION RATE: 18 BRPM | DIASTOLIC BLOOD PRESSURE: 90 MMHG | SYSTOLIC BLOOD PRESSURE: 174 MMHG | HEIGHT: 63 IN | HEART RATE: 91 BPM

## 2021-01-12 DIAGNOSIS — R00.2 PALPITATIONS: Primary | ICD-10-CM

## 2021-01-12 DIAGNOSIS — R73.9 ELEVATED BLOOD SUGAR: ICD-10-CM

## 2021-01-12 DIAGNOSIS — E78.00 PURE HYPERCHOLESTEROLEMIA: ICD-10-CM

## 2021-01-12 DIAGNOSIS — I10 ESSENTIAL HYPERTENSION: ICD-10-CM

## 2021-01-12 DIAGNOSIS — R79.89 ABNORMAL THYROID BLOOD TEST: Primary | ICD-10-CM

## 2021-01-12 PROCEDURE — 93000 ELECTROCARDIOGRAM COMPLETE: CPT | Performed by: INTERNAL MEDICINE

## 2021-01-12 PROCEDURE — 99214 OFFICE O/P EST MOD 30 MIN: CPT | Performed by: INTERNAL MEDICINE

## 2021-01-12 RX ORDER — OMEPRAZOLE 20 MG/1
20 CAPSULE, DELAYED RELEASE ORAL AS NEEDED
COMMUNITY
End: 2022-02-07

## 2021-01-12 NOTE — PROGRESS NOTES
Cardiology Office Consultation      Encounter Date:  01/12/2021    Patient ID:   Olga Garcia is a 45 y.o. female.    Reason For Consultation:  Palpitations  Shortness of breath    History of Present Illness:  Dear Dr. Connelly, Salvador VAZQUEZ MD    I had the pleasure of seeing Olga Garcia today. As you are well aware, this is a 45 y.o. female here for evaluation for symptoms of palpitations shortness of breath    She was previously evaluated at [facility] at that time patient had symptoms of chest discomfort ruled out for MI she had a Lexiscan Cardiolite stress test showing evidence of fixed perfusion defect involving the anterior wall with no evidence of any significant wall motion abnormalities.      Complaining of intermittent symptoms of palpitations which are progressively getting worse  Planing of shortness of breath and dyspnea on exertion  Mild nonspecific edema in the lower extremities  No dizziness or syncope  Orthopnea no PND      Assessment & Plan    Impressions:  Palpitations  Shortness of breath  Impaired fasting glucose  Morbid obesity  Obstructive sleep apnea  Bilateral lower extremity edema most likely venous edema  Echocardiogram with normal LV systolic function LV hypertrophy and diastolic dysfunction  Hypothyroidism currently on Synthroid supplements      Recommendations:  Prior work-up including labs echocardiogram venous Dopplers reviewed and discussed with the patient  Likely lower extremity edema is venous edema  Palpitations most likely ectopic beats  Plan to schedule patient for a treadmill stress test for further stratification for symptoms of shortness of breath and also further evaluation of the palpitations to rule out any exercise-induced cardiac arrhythmias  Schedule patient for Holter monitor for 2 to 3 weeks to further evaluate the symptoms of palpitations  Continue current medical management  Need for aggressive risk factor modification and need for significant weight  "loss reviewed and discussed with the patient  Recent labs reviewed and discussed with the patient  Follow up in office in 2 months    Objective:    Vitals:  Vitals:    01/12/21 1432   BP: 176/99   BP Location: Left arm   Pulse: 91   Resp: 18   SpO2: 97%   Weight: 115 kg (254 lb)   Height: 160 cm (63\")       Physical Exam:    General: Alert, cooperative, no distress, appears stated age  Head:  Normocephalic, atraumatic, mucous membranes moist  Eyes:  Conjunctiva/corneas clear, EOM's intact     Neck:  Supple,  no adenopathy;      Lungs: Clear to auscultation bilaterally, no wheezes rhonchi rales are noted  Chest wall: No tenderness  Heart::  Regular rate and rhythm, S1 and S2 normal, no murmur, rub or gallop  Abdomen: Soft, non-tender, nondistended bowel sounds active  Extremities: No cyanosis, clubbing, or edema  Pulses: 2+ and symmetric all extremities  Skin:  No rashes or lesions  Neuro/psych: A&O x3. CN II through XII are grossly intact with appropriate affect    History of Present Illness      Allergies:  Allergies   Allergen Reactions   • Clarithromycin Irritability       Medication Review:     Current Outpatient Medications:   •  atorvastatin (LIPITOR) 20 MG tablet, TAKE 1 TABLET BY MOUTH DAILY, Disp: 90 tablet, Rfl: 1  •  clomiPRAMINE (ANAFRANIL) 50 MG capsule, 5 tablets daily, Disp: , Rfl:   •  clonazePAM (KlonoPIN) 0.5 MG tablet, TK 1 T PO TID PRA, Disp: , Rfl: 5  •  fluticasone (FLONASE) 50 MCG/ACT nasal spray, USE 2 SPRAYS IN EACH NOSTRIL DAILY AS DIRECTED BY PROVIDER, Disp: 48 g, Rfl: 1  •  levothyroxine (SYNTHROID, LEVOTHROID) 75 MCG tablet, TAKE 1 TABLET BY MOUTH EVERY DAY, Disp: 90 tablet, Rfl: 1  •  losartan (COZAAR) 100 MG tablet, TAKE 1 TABLET BY MOUTH DAILY, Disp: 90 tablet, Rfl: 3  •  Multiple Vitamins-Minerals (MULTI COMPLETE PO), Take  by mouth., Disp: , Rfl:   •  omeprazole (priLOSEC) 20 MG capsule, Take 20 mg by mouth As Needed., Disp: , Rfl:   •  OXcarbazepine (TRILEPTAL) 600 MG tablet, 600 " mg 2 (Two) Times a Day., Disp: , Rfl:   •  VRAYLAR 3 MG capsule, TK 1 C PO D, Disp: , Rfl:     Family History:  Family History   Problem Relation Age of Onset   • Hypertension Mother    • Atrial fibrillation Mother    • Diabetes Mother    • Hypertension Father    • Diabetes Father    • Bipolar disorder Father    • Heart attack Father        Past Medical History:  Past Medical History:   Diagnosis Date   • Allergic    • Anxiety    • Bipolar 1 disorder (CMS/HCC)    • Hypothyroidism    • Obesity    • OCD (obsessive compulsive disorder)    • RASHARD (obstructive sleep apnea)        Past surgical History:  Past Surgical History:   Procedure Laterality Date   •  SECTION     • SINUS SURGERY         Social History:  Social History     Socioeconomic History   • Marital status:      Spouse name: Not on file   • Number of children: Not on file   • Years of education: Not on file   • Highest education level: Not on file   Tobacco Use   • Smoking status: Never Smoker   • Smokeless tobacco: Never Used   Substance and Sexual Activity   • Alcohol use: Yes     Frequency: Monthly or less     Comment: Social   • Drug use: No       Review of Systems:  The following systems were reviewed as they relate to the cardiovascular system: Constitutional, Eyes, ENT, Cardiovascular, Respiratory, Gastrointestinal, Integumentary, Neurological, Psychiatric, Hematologic, Endocrine, Musculoskeletal, and Genitourinary. The pertinent cardiovascular findings are reported above with all other cardiovascular points within those systems being negative.    Diagnostic Study Review:     Current Electrocardiogram:    ECG 12 Lead    Date/Time: 2021 3:42 PM  Performed by: Jitendra Rivers MD  Authorized by: Jitendra Rivers MD   Comparison: compared with previous ECG   Similar to previous ECG  Rhythm: sinus rhythm  Ectopy: unifocal PVCs  Rate: normal  BPM: 91  Conduction: conduction normal  QRS axis: normal  Other findings:  non-specific ST-T wave changes    Clinical impression: abnormal EKG                NOTE: The following portions of the patient's history were reviewed and updated this visit as appropriate: allergies, current medications, past family history, past medical history, past social history, past surgical history and problem list.

## 2021-01-12 NOTE — TELEPHONE ENCOUNTER
Schedule cmp, lipid, tsh, free t4, total t3, vitamin D, for next week   Dx  Physical, vitamin D def, hypothyroidism  Do I have any openings    normal behavior/normal affect

## 2021-01-12 NOTE — TELEPHONE ENCOUNTER
Patient saw her cardiologist today and found out a few things that she would like to speak with you about.  Please call her to discuss.

## 2021-01-13 NOTE — TELEPHONE ENCOUNTER
Okay I cannot work in any other patients.  If the patient wants to do the blood work we can, if she wants she will have to follow-up with another physician as far as a physical, I am out of timeowe

## 2021-01-13 NOTE — TELEPHONE ENCOUNTER
Dr Connelly - someone took the 2/10 at 3pm appt before I got patient put on it.  You have a 4:30pm available on 2/10.  Would that work?

## 2021-01-13 NOTE — TELEPHONE ENCOUNTER
PATIENT CALLED ATTEMPTING TO GET IN TOUCH WITH SHAUNA, WARM TRANSFER FAILED.    PLEASE ADVISE  [phone]

## 2021-01-13 NOTE — TELEPHONE ENCOUNTER
As of right now, you have open 2/10 a 2pm and 3pm and on 2/11 a 9:15am, 10am, 11:30am.  Do you want her at any of those?  If so, for what?

## 2021-01-14 NOTE — TELEPHONE ENCOUNTER
Spoke with patient and scheduled a [facility] appt on 001/21/2021 at 9:30am.

## 2021-01-14 NOTE — TELEPHONE ENCOUNTER
Spoke with patient and scheduled a physical with JO ANN on 02/08/2021 at 9:30am.  He had a cancellation.

## 2021-01-15 ENCOUNTER — TELEPHONE (OUTPATIENT)
Dept: FAMILY MEDICINE CLINIC | Facility: CLINIC | Age: 46
End: 2021-01-15

## 2021-01-15 RX ORDER — ATORVASTATIN CALCIUM 20 MG/1
20 TABLET, FILM COATED ORAL DAILY
Qty: 90 TABLET | Refills: 1 | Status: SHIPPED | OUTPATIENT
Start: 2021-01-15 | End: 2021-06-28 | Stop reason: SDUPTHER

## 2021-01-18 RX ORDER — AMLODIPINE BESYLATE 5 MG/1
5 TABLET ORAL DAILY
Qty: 30 TABLET | Refills: 6 | Status: SHIPPED | OUTPATIENT
Start: 2021-01-18 | End: 2021-01-19

## 2021-01-18 NOTE — TELEPHONE ENCOUNTER
Pt called to report that her BP was 137/100 last night. She is currently taking Losartan. Per Dr. Villalta add Norvasc 5mg- 1 po daily and monitor BP closely. I informed the Pt of this and she stated understanding.I also advised the Pt to call the office in 2-3 weeks to let us know how she is doing or sooner if needed. Pt is agreeable to this plan. Rx sent.

## 2021-01-19 RX ORDER — AMLODIPINE BESYLATE 5 MG/1
5 TABLET ORAL DAILY
Qty: 90 TABLET | Refills: 1 | Status: SHIPPED | OUTPATIENT
Start: 2021-01-19 | End: 2021-02-08

## 2021-01-21 ENCOUNTER — APPOINTMENT (OUTPATIENT)
Dept: CARDIOLOGY | Facility: HOSPITAL | Age: 46
End: 2021-01-21

## 2021-01-28 ENCOUNTER — APPOINTMENT (OUTPATIENT)
Dept: CARDIOLOGY | Facility: HOSPITAL | Age: 46
End: 2021-01-28

## 2021-01-28 ENCOUNTER — TELEPHONE (OUTPATIENT)
Dept: FAMILY MEDICINE CLINIC | Facility: CLINIC | Age: 46
End: 2021-01-28

## 2021-01-28 RX ORDER — METOPROLOL SUCCINATE 25 MG/1
25 TABLET, EXTENDED RELEASE ORAL DAILY
Qty: 30 TABLET | Refills: 6 | Status: CANCELLED | OUTPATIENT
Start: 2021-01-28

## 2021-01-28 NOTE — TELEPHONE ENCOUNTER
Caller: Olga Garcia    Relationship to patient: Self    Best call back number: [phone]     Chief complaint: PATIENT IS CURRENTLY IN QUARANTINE AND WILL BE OUT OF QUARANTINE ON 2/1/2021. 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     Type of visit: PHYSICAL W/ LABS    Requested date: FIRST WEEK OF MARCH OR SO.    If rescheduling, when is the original appointment: 02/08/2021.    Additional notes:PATIENT HAS SOME CONCERN THAT SHE WOULD LIKE TO ADDRESS. PATIENT IS EXPERIENCING HIGH BP AND WAS RECOMMENDED TO TAKE POPROL XL AND WAS PRESCRIBED AMLODIPINE. PATIENT WOULD LIKE CLINICAL ADVICE ON TAKING THESE MEDICATIONS.     PATIENT WOULD ALSO LIKE TO KNOW WHICH PROVIDERS WITHIN THE OFFICE SHE CAN SEE TO ESTABLISH CARE GOING FORWARD.    PATIENT REQUESTS A CALL BACK WHEN CLINICAL STAFF IS AVAILABLE.

## 2021-02-02 ENCOUNTER — HOSPITAL ENCOUNTER (OUTPATIENT)
Dept: CARDIOLOGY | Facility: HOSPITAL | Age: 46
Discharge: HOME | End: 2021-02-02

## 2021-02-02 DIAGNOSIS — R00.2 PALPITATIONS: ICD-10-CM

## 2021-02-02 DIAGNOSIS — I10 ESSENTIAL HYPERTENSION: ICD-10-CM

## 2021-02-02 PROCEDURE — 93018 CV STRESS TEST I&R ONLY: CPT | Performed by: INTERNAL MEDICINE

## 2021-02-02 PROCEDURE — 93246 EXT ECG>7D<15D RECORDING: CPT

## 2021-02-02 PROCEDURE — 93017 CV STRESS TEST TRACING ONLY: CPT

## 2021-02-02 PROCEDURE — 93016 CV STRESS TEST SUPVJ ONLY: CPT | Performed by: INTERNAL MEDICINE

## 2021-02-08 ENCOUNTER — LAB (OUTPATIENT)
Dept: FAMILY MEDICINE CLINIC | Facility: CLINIC | Age: 46
End: 2021-02-08

## 2021-02-08 ENCOUNTER — OFFICE VISIT (OUTPATIENT)
Dept: FAMILY MEDICINE CLINIC | Facility: CLINIC | Age: 46
End: 2021-02-08

## 2021-02-08 VITALS
TEMPERATURE: 97.1 F | RESPIRATION RATE: 12 BRPM | BODY MASS INDEX: 45.75 KG/M2 | WEIGHT: 258.2 LBS | SYSTOLIC BLOOD PRESSURE: 137 MMHG | HEART RATE: 101 BPM | DIASTOLIC BLOOD PRESSURE: 85 MMHG | HEIGHT: 63 IN

## 2021-02-08 DIAGNOSIS — I10 ESSENTIAL HYPERTENSION: ICD-10-CM

## 2021-02-08 DIAGNOSIS — R22.1 NECK NODULE: ICD-10-CM

## 2021-02-08 DIAGNOSIS — F41.9 ANXIETY: ICD-10-CM

## 2021-02-08 DIAGNOSIS — Z11.59 NEED FOR HEPATITIS C SCREENING TEST: ICD-10-CM

## 2021-02-08 DIAGNOSIS — Z00.00 PHYSICAL EXAM: Primary | ICD-10-CM

## 2021-02-08 DIAGNOSIS — G47.33 OBSTRUCTIVE SLEEP APNEA: ICD-10-CM

## 2021-02-08 DIAGNOSIS — R60.9 PERIPHERAL EDEMA: ICD-10-CM

## 2021-02-08 DIAGNOSIS — R73.9 ELEVATED BLOOD SUGAR: ICD-10-CM

## 2021-02-08 DIAGNOSIS — E55.9 VITAMIN D DEFICIENCY: ICD-10-CM

## 2021-02-08 DIAGNOSIS — E03.9 HYPOTHYROIDISM, UNSPECIFIED TYPE: ICD-10-CM

## 2021-02-08 LAB
25(OH)D3 SERPL-MCNC: 28.7 NG/ML (ref 30–100)
ALBUMIN SERPL-MCNC: 4 G/DL (ref 3.5–5.2)
ALBUMIN/GLOB SERPL: 1.4 G/DL
ALP SERPL-CCNC: 90 U/L (ref 39–117)
ALT SERPL W P-5'-P-CCNC: 31 U/L (ref 1–33)
ANION GAP SERPL CALCULATED.3IONS-SCNC: 7.9 MMOL/L (ref 5–15)
AST SERPL-CCNC: 25 U/L (ref 1–32)
BASOPHILS # BLD AUTO: 0.03 10*3/MM3 (ref 0–0.2)
BASOPHILS NFR BLD AUTO: 0.6 % (ref 0–1.5)
BILIRUB SERPL-MCNC: 0.3 MG/DL (ref 0–1.2)
BILIRUB UR QL STRIP: NEGATIVE
BUN SERPL-MCNC: 11 MG/DL (ref 6–20)
BUN/CREAT SERPL: 15.7 (ref 7–25)
CALCIUM SPEC-SCNC: 9.4 MG/DL (ref 8.6–10.5)
CHLORIDE SERPL-SCNC: 101 MMOL/L (ref 98–107)
CHOLEST SERPL-MCNC: 132 MG/DL (ref 0–200)
CLARITY UR: ABNORMAL
CO2 SERPL-SCNC: 28.1 MMOL/L (ref 22–29)
COLOR UR: YELLOW
CREAT SERPL-MCNC: 0.7 MG/DL (ref 0.57–1)
DEPRECATED RDW RBC AUTO: 43 FL (ref 37–54)
EOSINOPHIL # BLD AUTO: 0.12 10*3/MM3 (ref 0–0.4)
EOSINOPHIL NFR BLD AUTO: 2.3 % (ref 0.3–6.2)
ERYTHROCYTE [DISTWIDTH] IN BLOOD BY AUTOMATED COUNT: 13.1 % (ref 12.3–15.4)
GFR SERPL CREATININE-BSD FRML MDRD: 91 ML/MIN/1.73
GLOBULIN UR ELPH-MCNC: 2.8 GM/DL
GLUCOSE SERPL-MCNC: 96 MG/DL (ref 65–99)
GLUCOSE UR STRIP-MCNC: NEGATIVE MG/DL
HCT VFR BLD AUTO: 36.2 % (ref 34–46.6)
HCV AB SER DONR QL: NORMAL
HDLC SERPL-MCNC: 50 MG/DL (ref 40–60)
HGB BLD-MCNC: 12 G/DL (ref 12–15.9)
HGB UR QL STRIP.AUTO: NEGATIVE
IMM GRANULOCYTES # BLD AUTO: 0.01 10*3/MM3 (ref 0–0.05)
IMM GRANULOCYTES NFR BLD AUTO: 0.2 % (ref 0–0.5)
KETONES UR QL STRIP: NEGATIVE
LDLC SERPL CALC-MCNC: 70 MG/DL (ref 0–100)
LDLC/HDLC SERPL: 1.41 {RATIO}
LEUKOCYTE ESTERASE UR QL STRIP.AUTO: NEGATIVE
LYMPHOCYTES # BLD AUTO: 1.04 10*3/MM3 (ref 0.7–3.1)
LYMPHOCYTES NFR BLD AUTO: 19.7 % (ref 19.6–45.3)
MCH RBC QN AUTO: 29.8 PG (ref 26.6–33)
MCHC RBC AUTO-ENTMCNC: 33.1 G/DL (ref 31.5–35.7)
MCV RBC AUTO: 89.8 FL (ref 79–97)
MONOCYTES # BLD AUTO: 0.41 10*3/MM3 (ref 0.1–0.9)
MONOCYTES NFR BLD AUTO: 7.8 % (ref 5–12)
NEUTROPHILS NFR BLD AUTO: 3.66 10*3/MM3 (ref 1.7–7)
NEUTROPHILS NFR BLD AUTO: 69.4 % (ref 42.7–76)
NITRITE UR QL STRIP: NEGATIVE
NRBC BLD AUTO-RTO: 0.2 /100 WBC (ref 0–0.2)
PH UR STRIP.AUTO: 7 [PH] (ref 5–8)
PLATELET # BLD AUTO: 240 10*3/MM3 (ref 140–450)
PMV BLD AUTO: 10.4 FL (ref 6–12)
POTASSIUM SERPL-SCNC: 3.9 MMOL/L (ref 3.5–5.2)
PROT SERPL-MCNC: 6.8 G/DL (ref 6–8.5)
PROT UR QL STRIP: NEGATIVE
RBC # BLD AUTO: 4.03 10*6/MM3 (ref 3.77–5.28)
SODIUM SERPL-SCNC: 137 MMOL/L (ref 136–145)
SP GR UR STRIP: 1.02 (ref 1–1.03)
T3 SERPL-MCNC: 95 NG/DL (ref 80–200)
T4 FREE SERPL-MCNC: 0.95 NG/DL (ref 0.93–1.7)
TRIGL SERPL-MCNC: 57 MG/DL (ref 0–150)
TSH SERPL DL<=0.05 MIU/L-ACNC: 0.93 UIU/ML (ref 0.27–4.2)
UROBILINOGEN UR QL STRIP: ABNORMAL
VLDLC SERPL-MCNC: 12 MG/DL (ref 5–40)
WBC # BLD AUTO: 5.27 10*3/MM3 (ref 3.4–10.8)

## 2021-02-08 PROCEDURE — 82306 VITAMIN D 25 HYDROXY: CPT | Performed by: FAMILY MEDICINE

## 2021-02-08 PROCEDURE — 36415 COLL VENOUS BLD VENIPUNCTURE: CPT

## 2021-02-08 PROCEDURE — 99396 PREV VISIT EST AGE 40-64: CPT | Performed by: FAMILY MEDICINE

## 2021-02-08 PROCEDURE — 80053 COMPREHEN METABOLIC PANEL: CPT | Performed by: FAMILY MEDICINE

## 2021-02-08 PROCEDURE — 84439 ASSAY OF FREE THYROXINE: CPT | Performed by: FAMILY MEDICINE

## 2021-02-08 PROCEDURE — 80061 LIPID PANEL: CPT | Performed by: FAMILY MEDICINE

## 2021-02-08 PROCEDURE — 84480 ASSAY TRIIODOTHYRONINE (T3): CPT | Performed by: FAMILY MEDICINE

## 2021-02-08 PROCEDURE — 84443 ASSAY THYROID STIM HORMONE: CPT | Performed by: FAMILY MEDICINE

## 2021-02-08 PROCEDURE — 85025 COMPLETE CBC W/AUTO DIFF WBC: CPT | Performed by: FAMILY MEDICINE

## 2021-02-08 PROCEDURE — 81003 URINALYSIS AUTO W/O SCOPE: CPT | Performed by: FAMILY MEDICINE

## 2021-02-08 PROCEDURE — 86803 HEPATITIS C AB TEST: CPT | Performed by: FAMILY MEDICINE

## 2021-02-08 RX ORDER — NEBIVOLOL 5 MG/1
5 TABLET ORAL DAILY
Qty: 30 TABLET | Refills: 6 | Status: SHIPPED | OUTPATIENT
Start: 2021-02-08 | End: 2021-04-06 | Stop reason: DRUGHIGH

## 2021-02-08 RX ORDER — CLOMIPRAMINE HYDROCHLORIDE 50 MG/1
CAPSULE ORAL
Qty: 150 CAPSULE | Refills: 6 | COMMUNITY
Start: 2021-02-08

## 2021-02-08 NOTE — PROGRESS NOTES
Rooming Tab(CC,VS,Pt Hx,Fall Screen)  Chief Complaint   Patient presents with   • Annual Exam       Subjective 45-year-old here for her physical.  Patient does not exercise.  She eats too many carbs and she eats out a lot both fast food and other restaurants.  She has not been able lose weight and actually has gained about 20 pounds over the last couple of years.  She does drink soft drinks as well.  She is seeing a cardiologist for palpitations and patient has a monitor on currently.  She denies any chest pain or dizziness or syncope.  She was started on amlodipine for her blood pressure about a month ago and she has been having swelling in her feet and legs ever since.  She she does have some issues with MEYER however she has not been exercising and is gained weight.  She has no orthopnea or PND.  She follows Dr. Braxton for gynecology and she had her exam about a month ago.  She continues with monthly periods.      Patient has obstructive sleep apnea and uses her CPAP every night for 6 to 8 hours.  She continues being sleepy all the time and having low energy.      She has a bump on the left side of her neck that she noted about a week ago and its was painful but it is better now.  She states it has been there for about a week and it is gotten better already.  She has no fever or chills with it.      Patient also sees a psychiatrist and is currently on Anafranil 50 mg 5 capsules daily, Klonopin 0.5 mg as needed, oxcarbazepine 600 mg 2 daily, Vraylar 3 mg daily.       She also has hypertension and currently takes losartan 100 mg daily, has hyperlipidemia and is on atorvastatin 20 mg daily.  She was recently put on amlodipine 5 mg daily and has swelling in her legs and feet.    I have reviewed and updated her medications, medical history and problem list during today's office visit.     Patient Care Team:  Salvador Connelly MD as PCP - General    Problem List Tab  Medications Tab  Synopsis Tab  Chart Review Tab  Care  "Everywhere Tab  Immunizations Tab  Patient History Tab    Social History     Tobacco Use   • Smoking status: Never Smoker   • Smokeless tobacco: Never Used   Substance Use Topics   • Alcohol use: Yes     Frequency: Monthly or less     Drinks per session: 1 or 2     Binge frequency: Never     Comment: Social       Review of Systems   Constitutional: Positive for fatigue. Negative for chills and fever.   HENT: Negative for congestion and nosebleeds.    Eyes: Negative for double vision.   Respiratory: Positive for shortness of breath. Negative for chest tightness.    Cardiovascular: Positive for leg swelling. Negative for chest pain and palpitations.   Gastrointestinal: Negative for abdominal pain and blood in stool.   Endocrine: Negative for polydipsia and polyuria.   Genitourinary: Negative for dysuria and hematuria.   Musculoskeletal: Negative for gait problem and neck pain.   Neurological: Negative for dizziness and syncope.   Psychiatric/Behavioral: Positive for stress. Negative for self-injury and depressed mood. The patient is nervous/anxious.        Objective     Rooming Tab(CC,VS,Pt Hx,Fall Screen)  /85   Pulse 101   Temp 97.1 °F (36.2 °C)   Resp 12   Ht 160 cm (63\")   Wt 117 kg (258 lb 3.2 oz)   BMI 45.74 kg/m²     Body mass index is 45.74 kg/m².    Physical Exam  Vitals signs and nursing note reviewed.   Constitutional:       General: She is not in acute distress.     Appearance: She is well-developed. She is obese.      Comments: Obese pleasant female who is in no acute distress   HENT:      Head: Normocephalic and atraumatic.      Right Ear: Tympanic membrane and ear canal normal.      Left Ear: Tympanic membrane and ear canal normal.      Nose: Nose normal.      Mouth/Throat:      Mouth: Mucous membranes are moist.      Pharynx: Oropharynx is clear.   Eyes:      General: Lids are normal.      Extraocular Movements: Extraocular movements intact.      Conjunctiva/sclera: Conjunctivae normal.     "  Pupils: Pupils are equal, round, and reactive to light.   Neck:      Musculoskeletal: Normal range of motion and neck supple.      Thyroid: No thyroid mass or thyromegaly.      Vascular: No carotid bruit or JVD.      Trachea: Trachea normal.      Comments: No carotid bruits  The patient has a small mobile nodule subcutaneous left side of neck in the region of the supraclavicular fossa more medially  Cardiovascular:      Rate and Rhythm: Normal rate and regular rhythm.      Pulses: Normal pulses.      Heart sounds: Normal heart sounds.   Pulmonary:      Effort: Pulmonary effort is normal.      Breath sounds: Normal breath sounds.   Abdominal:      Comments: Obese, bowel sounds present, soft nontender nondistended, no organomegaly or masses.   Musculoskeletal:      Comments: She has significant edema in her feet and lower legs.  DP pulses are palpable.  Sensation is intact.   Skin:     General: Skin is warm and dry.   Neurological:      General: No focal deficit present.      Mental Status: She is alert and oriented to person, place, and time.      Cranial Nerves: No cranial nerve deficit.   Psychiatric:         Attention and Perception: She is attentive.         Mood and Affect: Mood normal.         Speech: Speech normal.         Behavior: Behavior normal.         Thought Content: Thought content normal.         Judgment: Judgment normal.          Statin Choice Calculator  Data Reviewed:               Lab Results   Component Value Date    BUN 11 02/08/2021    CREATININE 0.70 02/08/2021    EGFRIFNONA 91 02/08/2021     02/08/2021    K 3.9 02/08/2021     02/08/2021    CALCIUM 9.4 02/08/2021    ALBUMIN 4.00 02/08/2021    BILITOT 0.3 02/08/2021    ALKPHOS 90 02/08/2021    AST 25 02/08/2021    ALT 31 02/08/2021    TRIG 57 02/08/2021    HDL 50 02/08/2021    VLDL 12 02/08/2021    LDL 70 02/08/2021    LDLHDL 1.41 02/08/2021    WBC 5.27 02/08/2021    RBC 4.03 02/08/2021    HCT 36.2 02/08/2021    MCV 89.8  02/08/2021    MCH 29.8 02/08/2021    TSH 0.931 02/08/2021    FREET4 0.95 02/08/2021    BBDX80PK 28.7 (L) 02/08/2021      Assessment/Plan   Order Review Tab  Health Maintenance Tab  Patient Plan/Order Tab  Diagnoses and all orders for this visit:    1. Physical exam (Primary)  Assessment & Plan:  I discussed with the patient she absolutely has to get some routine aerobic exercise for cardiovascular health.  She needs to eat a nutritious diet high in fiber and vegetable, lower carbohydrate and decrease her portion intake.  She needs to cut out fast food and eating out so much.  She needs to stop drinking soft drinks altogether.    Orders:  -     CBC & Differential  -     Cancel: Comprehensive Metabolic Panel  -     Lipid Panel  -     TSH  -     T4, Free  -     T3  -     Urinalysis With Culture If Indicated - Urine, Clean Catch  -     CBC Auto Differential    2. Need for hepatitis C screening test  -     Hepatitis C Antibody    3. Anxiety    4. Hypothyroidism, unspecified type    5. Vitamin D deficiency  -     Vitamin D 25 Hydroxy    6. Peripheral edema  Assessment & Plan:  Secondary to the Norvasc.  She should stop Norvasc and we will see how this improves over time      7. Obstructive sleep apnea  Assessment & Plan:  She needs to follow-up with her sleep doctor to possibly make adjustments in her CPAP machine.  With her weight gain this may have affected her settings.      8. Neck nodule  Assessment & Plan:  Since this is improving and is quite small, I would see if it does not go away on its own.  I told the patient if it is persistent then I would see an ENT.  I would give it a months to go away on its own unless if he gets worse then she can see ENT prior to that       9. Essential hypertension  Assessment & Plan:  Despite the blood pressure being much improved on Norvasc, will discontinue it due to her significant edema.  We will try Bystolic 5 mg daily      Other orders  -     clomiPRAMINE (ANAFRANIL) 50 MG  capsule; 5 po q d  Dispense: 150 capsule; Refill: 6  -     nebivolol (Bystolic) 5 MG tablet; Take 1 tablet by mouth Daily.  Dispense: 30 tablet; Refill: 6      Wrapup Tab  Return in about 4 months (around 6/8/2021) for Recheck.

## 2021-02-09 LAB
BH CV STRESS BP STAGE 1: NORMAL
BH CV STRESS BP STAGE 2: NORMAL
BH CV STRESS DURATION MIN STAGE 1: 3
BH CV STRESS DURATION MIN STAGE 2: 2
BH CV STRESS DURATION SEC STAGE 1: 0
BH CV STRESS DURATION SEC STAGE 2: 59
BH CV STRESS GRADE STAGE 1: 10
BH CV STRESS GRADE STAGE 2: 12
BH CV STRESS HR STAGE 1: 129
BH CV STRESS HR STAGE 2: 142
BH CV STRESS METS STAGE 1: 5
BH CV STRESS METS STAGE 2: 7
BH CV STRESS PROTOCOL 1: NORMAL
BH CV STRESS RECOVERY BP: NORMAL MMHG
BH CV STRESS RECOVERY HR: 99 BPM
BH CV STRESS SPEED STAGE 1: 1.7
BH CV STRESS SPEED STAGE 2: 2.5
BH CV STRESS STAGE 1: 1
BH CV STRESS STAGE 2: 2
MAXIMAL PREDICTED HEART RATE: 176 BPM
PERCENT MAX PREDICTED HR: 80.68 %
STRESS BASELINE BP: NORMAL MMHG
STRESS BASELINE HR: 97 BPM
STRESS PERCENT HR: 95 %
STRESS POST ESTIMATED WORKLOAD: 7 METS
STRESS POST EXERCISE DUR MIN: 5 MIN
STRESS POST EXERCISE DUR SEC: 59 SEC
STRESS POST PEAK BP: NORMAL MMHG
STRESS POST PEAK HR: 142 BPM
STRESS TARGET HR: 150 BPM

## 2021-02-09 NOTE — ASSESSMENT & PLAN NOTE
She needs to follow-up with her sleep doctor to possibly make adjustments in her CPAP machine.  With her weight gain this may have affected her settings.

## 2021-02-09 NOTE — ASSESSMENT & PLAN NOTE
I discussed with the patient she absolutely has to get some routine aerobic exercise for cardiovascular health.  She needs to eat a nutritious diet high in fiber and vegetable, lower carbohydrate and decrease her portion intake.  She needs to cut out fast food and eating out so much.  She needs to stop drinking soft drinks altogether.

## 2021-02-09 NOTE — ASSESSMENT & PLAN NOTE
Since this is improving and is quite small, I would see if it does not go away on its own.  I told the patient if it is persistent then I would see an ENT.  I would give it a months to go away on its own unless if he gets worse then she can see ENT prior to that

## 2021-02-09 NOTE — ASSESSMENT & PLAN NOTE
Despite the blood pressure being much improved on Norvasc, will discontinue it due to her significant edema.  We will try Bystolic 5 mg daily

## 2021-02-26 PROCEDURE — 93248 EXT ECG>7D<15D REV&INTERPJ: CPT | Performed by: INTERNAL MEDICINE

## 2021-03-03 ENCOUNTER — TELEPHONE (OUTPATIENT)
Dept: CARDIOLOGY | Facility: CLINIC | Age: 46
End: 2021-03-03

## 2021-03-03 NOTE — TELEPHONE ENCOUNTER
Called and informed the Pt per Dr. Villalta that her holter monitor looks okay. The Pt stated understanding.

## 2021-03-24 ENCOUNTER — OFFICE VISIT (OUTPATIENT)
Dept: CARDIOLOGY | Facility: CLINIC | Age: 46
End: 2021-03-24

## 2021-03-24 VITALS
DIASTOLIC BLOOD PRESSURE: 86 MMHG | OXYGEN SATURATION: 98 % | WEIGHT: 251 LBS | BODY MASS INDEX: 44.46 KG/M2 | SYSTOLIC BLOOD PRESSURE: 133 MMHG | HEART RATE: 79 BPM

## 2021-03-24 DIAGNOSIS — I10 ESSENTIAL HYPERTENSION: ICD-10-CM

## 2021-03-24 DIAGNOSIS — E78.00 PURE HYPERCHOLESTEROLEMIA: ICD-10-CM

## 2021-03-24 DIAGNOSIS — R00.2 PALPITATIONS: Primary | ICD-10-CM

## 2021-03-24 PROCEDURE — 99214 OFFICE O/P EST MOD 30 MIN: CPT | Performed by: INTERNAL MEDICINE

## 2021-03-24 NOTE — PROGRESS NOTES
Cardiology Office Visit      Encounter Date:  03/24/2021    Patient ID:   Olga Garcia is a 45 y.o. female.    Reason For Consultation:  Palpitations  Shortness of breath    History of Present Illness:  Dear Dr. Connelly, Salvador VAZQUEZ MD    I had the pleasure of seeing Olga Garcia today. As you are well aware, this is a 45 y.o. female here for evaluation for symptoms of palpitations shortness of breath    She was previously evaluated at [facility] at that time patient had symptoms of chest discomfort ruled out for MI she had a Lexiscan Cardiolite stress test showing evidence of fixed perfusion defect involving the anterior wall with no evidence of any significant wall motion abnormalities.      Complaining of intermittent symptoms of palpitations which are progressively getting worse  Planing of shortness of breath and dyspnea on exertion  Mild nonspecific edema in the lower extremities  No dizziness or syncope  Orthopnea no PND      Assessment & Plan    Impressions:  Palpitations  Shortness of breath  Impaired fasting glucose  Morbid obesity  Obstructive sleep apnea  Bilateral lower extremity edema most likely venous edema  Echocardiogram with normal LV systolic function LV hypertrophy and diastolic dysfunction  Hypothyroidism currently on Synthroid supplements  Obstructive sleep apnea currently using CPAP  Symptoms of palpitations most likely secondary to PAC burden improved with Bystolic and exercise and weight loss  Venous edema/improved    Recommendations:  Prior work-up including labs echocardiogram venous Dopplers reviewed and discussed with the patient  Lower extremity edema is better  Palpitations most likely ectopic beats/PACs Holter monitor results reviewed and discussed with the patient plan to continue Bystolic at this time  Treadmill stress test with no significant findings  Holter monitor with some PAC burden otherwise no significant pathology  Need for aggressive risk factor  modification and need for significant weight loss reviewed and discussed with the patient  For continued regular exercise weight loss and aggressive risk factor modification including treating underlying obstructive sleep apnea blood pressure control and lifestyle changes reviewed and discussed with the patient    Follow up in office in 6 months    Objective:    Vitals:  Vitals:    03/24/21 1537   BP: 133/86   Pulse: 79   SpO2: 98%   Weight: 114 kg (251 lb)       Physical Exam:    General: Alert, cooperative, no distress, appears stated age  Head:  Normocephalic, atraumatic, mucous membranes moist  Eyes:  Conjunctiva/corneas clear, EOM's intact     Neck:  Supple,  no adenopathy;      Lungs: Clear to auscultation bilaterally, no wheezes rhonchi rales are noted  Chest wall: No tenderness  Heart::  Regular rate and rhythm, S1 and S2 normal, no murmur, rub or gallop  Abdomen: Soft, non-tender, nondistended bowel sounds active  Extremities: No cyanosis, clubbing, or edema  Pulses: 2+ and symmetric all extremities  Skin:  No rashes or lesions  Neuro/psych: A&O x3. CN II through XII are grossly intact with appropriate affect      Allergies:  Allergies   Allergen Reactions   • Clarithromycin Irritability       Medication Review:     Current Outpatient Medications:   •  atorvastatin (LIPITOR) 20 MG tablet, Take 1 tablet by mouth Daily., Disp: 90 tablet, Rfl: 1  •  clomiPRAMINE (ANAFRANIL) 50 MG capsule, 5 po q d, Disp: 150 capsule, Rfl: 6  •  clonazePAM (KlonoPIN) 0.5 MG tablet, TK 1 T PO TID PRA, Disp: , Rfl: 5  •  fluticasone (FLONASE) 50 MCG/ACT nasal spray, USE 2 SPRAYS IN EACH NOSTRIL DAILY AS DIRECTED BY PROVIDER, Disp: 48 g, Rfl: 1  •  levothyroxine (SYNTHROID, LEVOTHROID) 75 MCG tablet, TAKE 1 TABLET BY MOUTH EVERY DAY, Disp: 90 tablet, Rfl: 1  •  losartan (COZAAR) 100 MG tablet, TAKE 1 TABLET BY MOUTH DAILY, Disp: 90 tablet, Rfl: 3  •  Multiple Vitamins-Minerals (MULTI COMPLETE PO), Take  by mouth., Disp: , Rfl:   •   nebivolol (Bystolic) 5 MG tablet, Take 1 tablet by mouth Daily., Disp: 30 tablet, Rfl: 6  •  omeprazole (priLOSEC) 20 MG capsule, Take 20 mg by mouth As Needed., Disp: , Rfl:   •  OXcarbazepine (TRILEPTAL) 600 MG tablet, 600 mg 2 (Two) Times a Day., Disp: , Rfl:   •  VRAYLAR 3 MG capsule, TK 1 C PO D, Disp: , Rfl:     Family History:  Family History   Problem Relation Age of Onset   • Hypertension Mother    • Atrial fibrillation Mother    • Diabetes Mother    • Hypertension Father    • Diabetes Father    • Bipolar disorder Father    • Heart attack Father        Past Medical History:  Past Medical History:   Diagnosis Date   • Allergic    • Anxiety    • Bipolar 1 disorder (CMS/HCC)    • Hyperlipidemia    • Hypertension    • Hypothyroidism    • Obesity    • OCD (obsessive compulsive disorder)    • RASHARD (obstructive sleep apnea)     cpap       Past surgical History:  Past Surgical History:   Procedure Laterality Date   •  SECTION      x1   • SINUS SURGERY         Social History:  Social History     Socioeconomic History   • Marital status:      Spouse name: Not on file   • Number of children: Not on file   • Years of education: Not on file   • Highest education level: Not on file   Tobacco Use   • Smoking status: Never Smoker   • Smokeless tobacco: Never Used   Substance and Sexual Activity   • Alcohol use: Yes     Comment: Social   • Drug use: No   • Sexual activity: Yes     Partners: Male       Review of Systems:  The following systems were reviewed as they relate to the cardiovascular system: Constitutional, Eyes, ENT, Cardiovascular, Respiratory, Gastrointestinal, Integumentary, Neurological, Psychiatric, Hematologic, Endocrine, Musculoskeletal, and Genitourinary. The pertinent cardiovascular findings are reported above with all other cardiovascular points within those systems being negative.    Diagnostic Study Review:     Current Electrocardiogram:  Procedures      NOTE: The following portions of  the patient's history were reviewed and updated this visit as appropriate: allergies, current medications, past family history, past medical history, past social history, past surgical history and problem list.

## 2021-04-05 RX ORDER — LEVOTHYROXINE SODIUM 0.07 MG/1
75 TABLET ORAL DAILY
Qty: 90 TABLET | Refills: 1 | Status: SHIPPED | OUTPATIENT
Start: 2021-04-05 | End: 2021-05-03 | Stop reason: SDUPTHER

## 2021-04-06 RX ORDER — NEBIVOLOL 10 MG/1
10 TABLET ORAL DAILY
Qty: 30 TABLET | Refills: 6 | Status: SHIPPED | OUTPATIENT
Start: 2021-04-06 | End: 2021-11-01

## 2021-04-06 NOTE — TELEPHONE ENCOUNTER
Pt called stating that she has been taking Bystolic 5mg- 1 po daily and it has been helping with her palpitations and not feeling them. She stated that there has been a change and she is now starting to feel the palpitations again.She is wanting to know if Dr. Villalta would like to increase the Bystolic? She also takes Losartan 100mg- 1 po daily. Per Dr. Villalta increase Bystolic to 10mg- 1 po daily. I informed the Pt of this and she stated understanding. Rx sent.

## 2021-05-03 RX ORDER — LEVOTHYROXINE SODIUM 0.07 MG/1
75 TABLET ORAL DAILY
Qty: 90 TABLET | Refills: 0 | Status: SHIPPED | OUTPATIENT
Start: 2021-05-03 | End: 2021-08-20

## 2021-05-03 NOTE — TELEPHONE ENCOUNTER
Caller: Olga Garcia    Relationship: Self    Best call back number: [phone]    Medication needed:   Requested Prescriptions     Pending Prescriptions Disp Refills   • levothyroxine (SYNTHROID, LEVOTHROID) 75 MCG tablet 90 tablet 1     Sig: Take 1 tablet by mouth Daily.       When do you need the refill by: 05/05/21    What additional details did the patient provide when requesting the medication: PATIENT WAS FORMER PATIENT OF DR. ERNST, HAS AN APPOINTMENT SCHEDULED WITH [facility] IN JUNE    Does the patient have less than a 3 day supply:  [x] Yes  [] No    What is the patient's preferred pharmacy: [id number] - [place], [place] - [address] - [phone]  - [phone] FX

## 2021-06-08 ENCOUNTER — OFFICE VISIT (OUTPATIENT)
Dept: FAMILY MEDICINE CLINIC | Facility: CLINIC | Age: 46
End: 2021-06-08

## 2021-06-08 VITALS
TEMPERATURE: 98.1 F | WEIGHT: 256 LBS | HEART RATE: 76 BPM | DIASTOLIC BLOOD PRESSURE: 78 MMHG | SYSTOLIC BLOOD PRESSURE: 124 MMHG | HEIGHT: 63 IN | OXYGEN SATURATION: 98 % | BODY MASS INDEX: 45.36 KG/M2

## 2021-06-08 DIAGNOSIS — I10 ESSENTIAL HYPERTENSION: Primary | ICD-10-CM

## 2021-06-08 PROCEDURE — 99213 OFFICE O/P EST LOW 20 MIN: CPT | Performed by: NURSE PRACTITIONER

## 2021-06-08 RX ORDER — AMMONIUM LACTATE 12 G/100G
CREAM TOPICAL 2 TIMES DAILY
COMMUNITY
Start: 2021-03-08 | End: 2022-05-04

## 2021-06-08 NOTE — PROGRESS NOTES
"Chief Complaint  Leg Swelling (getting worse), hyperlipidemia, and Hypothyroidism    Subjective          Olga Garcia presents to [facility]  Pt comes in today for follow up on BP. At last appt Dr. Connelly had started bystolic in addition to losartan. BP is much improved. She mentions that she is tolerating it well and it has also helped with PVCs.   She is with c/o some lower leg edema. Has been an issue in the past. Admits to eating poorly, processed foods, fast food, and sodas.  Not exercising. Not drinking enough water. She knows she needs to work on these things.    She sees psych and also see [facility] (OCD clinic) regularly. No recent changes have been made.          Objective     Vital Signs:   /78 (BP Location: Left arm, Patient Position: Sitting, Cuff Size: Adult)   Pulse 76   Temp 98.1 °F (36.7 °C) (Oral)   Ht 160 cm (63\")   Wt 116 kg (256 lb)   SpO2 98%   BMI 45.35 kg/m²       BP Readings from Last 3 Encounters:   06/08/21 124/78   03/24/21 133/86   02/08/21 137/85       Wt Readings from Last 3 Encounters:   06/08/21 116 kg (256 lb)   03/24/21 114 kg (251 lb)   02/08/21 117 kg (258 lb 3.2 oz)       Physical Exam  Constitutional:       Appearance: She is well-developed. She is obese.   Eyes:      Pupils: Pupils are equal, round, and reactive to light.   Cardiovascular:      Rate and Rhythm: Normal rate and regular rhythm.   Pulmonary:      Effort: Pulmonary effort is normal.      Breath sounds: Normal breath sounds.   Neurological:      Mental Status: She is alert and oriented to person, place, and time.          Result Review :                   Assessment and Plan      Diagnoses and all orders for this visit:    1. Essential hypertension (Primary)  Assessment & Plan:  Hypertension is improving with treatment.  Continue current treatment regimen.  Dietary sodium restriction.  Weight loss.  Regular aerobic exercise.  Continue current medications.  Blood " pressure will be reassessed at the next regular appointment.      Will cont with bystolic and losartan. Cont to work on lifestyle changes.   Discussed importance of regular exercise and recommended starting or continuing a regular exercise program for good health. The patient was also encouraged to lose weight for better health.         Follow Up   Return in about 6 months (around 12/8/2021) for HTN follow up.  Patient was given instructions and counseling regarding her condition or for health maintenance advice. Please see specific information pulled into the AVS if appropriate.

## 2021-06-28 RX ORDER — ATORVASTATIN CALCIUM 20 MG/1
20 TABLET, FILM COATED ORAL DAILY
Qty: 90 TABLET | Refills: 1 | Status: SHIPPED | OUTPATIENT
Start: 2021-06-28 | End: 2021-11-29 | Stop reason: SDUPTHER

## 2021-08-20 RX ORDER — LEVOTHYROXINE SODIUM 0.07 MG/1
75 TABLET ORAL DAILY
Qty: 90 TABLET | Refills: 0 | Status: SHIPPED | OUTPATIENT
Start: 2021-08-20 | End: 2021-11-29

## 2021-09-28 RX ORDER — LOSARTAN POTASSIUM 100 MG/1
100 TABLET ORAL EVERY 24 HOURS
Qty: 90 TABLET | Refills: 1 | Status: SHIPPED | OUTPATIENT
Start: 2021-09-28 | End: 2022-03-25

## 2021-11-01 RX ORDER — NEBIVOLOL HYDROCHLORIDE 10 MG/1
TABLET ORAL
Qty: 30 TABLET | Refills: 6 | Status: SHIPPED | OUTPATIENT
Start: 2021-11-01 | End: 2022-05-04 | Stop reason: SDUPTHER

## 2021-11-03 PROBLEM — R06.09 DYSPNEA ON EXERTION: Status: ACTIVE | Noted: 2021-11-03

## 2021-11-03 PROBLEM — R00.2 PALPITATIONS: Status: ACTIVE | Noted: 2021-11-03

## 2021-11-29 ENCOUNTER — TELEPHONE (OUTPATIENT)
Dept: FAMILY MEDICINE CLINIC | Facility: CLINIC | Age: 46
End: 2021-11-29

## 2021-11-29 RX ORDER — ATORVASTATIN CALCIUM 20 MG/1
20 TABLET, FILM COATED ORAL DAILY
Qty: 90 TABLET | Refills: 1 | Status: SHIPPED | OUTPATIENT
Start: 2021-11-29 | End: 2022-06-20

## 2021-11-29 RX ORDER — LEVOTHYROXINE SODIUM 0.07 MG/1
75 TABLET ORAL DAILY
Qty: 90 TABLET | Refills: 1 | Status: SHIPPED | OUTPATIENT
Start: 2021-11-29 | End: 2022-04-29

## 2021-11-29 RX ORDER — LEVOTHYROXINE SODIUM 0.07 MG/1
TABLET ORAL
Qty: 90 TABLET | Refills: 1 | Status: SHIPPED | OUTPATIENT
Start: 2021-11-29 | End: 2021-11-29 | Stop reason: SDUPTHER

## 2021-12-08 ENCOUNTER — OFFICE VISIT (OUTPATIENT)
Dept: FAMILY MEDICINE CLINIC | Facility: CLINIC | Age: 46
End: 2021-12-08

## 2021-12-08 VITALS
HEIGHT: 63 IN | SYSTOLIC BLOOD PRESSURE: 126 MMHG | WEIGHT: 262 LBS | DIASTOLIC BLOOD PRESSURE: 76 MMHG | OXYGEN SATURATION: 99 % | TEMPERATURE: 97.7 F | HEART RATE: 76 BPM | BODY MASS INDEX: 46.42 KG/M2

## 2021-12-08 DIAGNOSIS — E66.01 CLASS 3 SEVERE OBESITY DUE TO EXCESS CALORIES WITH SERIOUS COMORBIDITY AND BODY MASS INDEX (BMI) OF 40.0 TO 44.9 IN ADULT (HCC): ICD-10-CM

## 2021-12-08 DIAGNOSIS — I10 PRIMARY HYPERTENSION: Primary | ICD-10-CM

## 2021-12-08 DIAGNOSIS — R22.1 NECK NODULE: ICD-10-CM

## 2021-12-08 PROCEDURE — 99213 OFFICE O/P EST LOW 20 MIN: CPT | Performed by: NURSE PRACTITIONER

## 2021-12-08 NOTE — PROGRESS NOTES
"Chief Complaint  6 month follow up  Subjective        Olga Garcia presents to [facility]  Pt comes in today for follow up on meds  Sees psych regularly for bipolar disorder and doing well on current medication regimen.   C/o small bump on left side of neck. Has been present for a long while. Not any bigger. Not painful. Not bothersome.        Objective     Vital Signs:   /76 (BP Location: Right arm, Patient Position: Sitting, Cuff Size: Adult)   Pulse 76   Temp 97.7 °F (36.5 °C) (Skin)   Ht 160 cm (63\")   Wt 119 kg (262 lb)   SpO2 99%   BMI 46.41 kg/m²       BP Readings from Last 3 Encounters:   12/08/21 126/76   06/08/21 124/78   03/24/21 133/86       Wt Readings from Last 3 Encounters:   12/08/21 119 kg (262 lb)   06/08/21 116 kg (256 lb)   03/24/21 114 kg (251 lb)     Physical Exam  Constitutional:       Appearance: She is well-developed. She is obese.   Eyes:      Pupils: Pupils are equal, round, and reactive to light.   Cardiovascular:      Rate and Rhythm: Normal rate and regular rhythm.   Pulmonary:      Effort: Pulmonary effort is normal.      Breath sounds: Normal breath sounds.   Neurological:      Mental Status: She is alert and oriented to person, place, and time.        Result Review :                 Assessment and Plan    Diagnoses and all orders for this visit:    1. Primary hypertension (Primary)  Assessment & Plan:  Hypertension is improving with treatment.  Continue current treatment regimen.  Dietary sodium restriction.  Weight loss.  Regular aerobic exercise.  Continue current medications.  Blood pressure will be reassessed at the next regular appointment.      2. Class 3 severe obesity due to excess calories with serious comorbidity and body mass index (BMI) of 40.0 to 44.9 in adult ([facility])    3. Neck nodule  Assessment & Plan:  Unchanged. Discussed referral to derm, but not interested at this time since it is not bothersome.       Discussed " importance of regular exercise and recommended starting or continuing a regular exercise program for good health. The patient was also encouraged to lose weight for better health.   During this office visit, we discussed the pertinent aspects of the visit and treatment recommendations. Pt verbalizes understanding. Follow up was discussed. Patient was given the opportunity to ask questions and discuss other concerns.         Follow Up   Return in about 3 months (around 3/8/2022) for Annual physical.  Patient was given instructions and counseling regarding her condition or for health maintenance advice. Please see specific information pulled into the AVS if appropriate.

## 2021-12-09 PROBLEM — Z00.00 PHYSICAL EXAM: Status: RESOLVED | Noted: 2021-02-08 | Resolved: 2021-12-09

## 2021-12-09 PROBLEM — R06.09 DYSPNEA ON EXERTION: Status: RESOLVED | Noted: 2021-11-03 | Resolved: 2021-12-09

## 2021-12-09 NOTE — ASSESSMENT & PLAN NOTE
Unchanged. Discussed referral to derm, but not interested at this time since it is not bothersome.

## 2022-01-07 ENCOUNTER — LAB (OUTPATIENT)
Dept: LAB | Facility: HOSPITAL | Age: 47
End: 2022-01-07

## 2022-01-07 ENCOUNTER — TRANSCRIBE ORDERS (OUTPATIENT)
Dept: ADMINISTRATIVE | Facility: HOSPITAL | Age: 47
End: 2022-01-07

## 2022-01-07 DIAGNOSIS — Z11.52 ENCOUNTER FOR SCREENING FOR COVID-19: Primary | ICD-10-CM

## 2022-01-07 DIAGNOSIS — Z11.52 ENCOUNTER FOR SCREENING FOR COVID-19: ICD-10-CM

## 2022-01-07 PROCEDURE — C9803 HOPD COVID-19 SPEC COLLECT: HCPCS

## 2022-01-07 PROCEDURE — U0004 COV-19 TEST NON-CDC HGH THRU: HCPCS

## 2022-01-08 LAB — SARS-COV-2 ORF1AB RESP QL NAA+PROBE: NOT DETECTED

## 2022-01-31 ENCOUNTER — TELEPHONE (OUTPATIENT)
Dept: FAMILY MEDICINE CLINIC | Facility: CLINIC | Age: 47
End: 2022-01-31

## 2022-02-07 ENCOUNTER — TELEPHONE (OUTPATIENT)
Dept: FAMILY MEDICINE CLINIC | Facility: CLINIC | Age: 47
End: 2022-02-07

## 2022-02-07 RX ORDER — OMEPRAZOLE 20 MG/1
20 CAPSULE, DELAYED RELEASE ORAL 2 TIMES DAILY
Qty: 180 CAPSULE | Refills: 1 | Status: SHIPPED | OUTPATIENT
Start: 2022-02-07 | End: 2023-02-06

## 2022-02-07 NOTE — TELEPHONE ENCOUNTER
Gave message to patient at 1:08pm.  She said she had a bad experience on the phone last week with someone so she thanked us for being so nice today.

## 2022-03-25 RX ORDER — LOSARTAN POTASSIUM 100 MG/1
TABLET ORAL
Qty: 30 TABLET | Refills: 3 | Status: SHIPPED | OUTPATIENT
Start: 2022-03-25 | End: 2022-07-20

## 2022-04-29 RX ORDER — LEVOTHYROXINE SODIUM 0.07 MG/1
TABLET ORAL
Qty: 90 TABLET | Refills: 1 | Status: SHIPPED | OUTPATIENT
Start: 2022-04-29 | End: 2022-05-02

## 2022-05-02 RX ORDER — LEVOTHYROXINE SODIUM 0.07 MG/1
TABLET ORAL
Qty: 31 TABLET | Refills: 2 | Status: SHIPPED | OUTPATIENT
Start: 2022-05-02 | End: 2022-09-20

## 2022-05-04 ENCOUNTER — OFFICE VISIT (OUTPATIENT)
Dept: CARDIOLOGY | Facility: CLINIC | Age: 47
End: 2022-05-04

## 2022-05-04 VITALS
DIASTOLIC BLOOD PRESSURE: 90 MMHG | HEIGHT: 63 IN | SYSTOLIC BLOOD PRESSURE: 150 MMHG | WEIGHT: 256 LBS | OXYGEN SATURATION: 98 % | HEART RATE: 96 BPM | BODY MASS INDEX: 45.36 KG/M2

## 2022-05-04 DIAGNOSIS — R00.2 PALPITATIONS: Primary | ICD-10-CM

## 2022-05-04 DIAGNOSIS — I10 PRIMARY HYPERTENSION: ICD-10-CM

## 2022-05-04 DIAGNOSIS — E78.00 PURE HYPERCHOLESTEROLEMIA: ICD-10-CM

## 2022-05-04 DIAGNOSIS — R60.9 PERIPHERAL EDEMA: ICD-10-CM

## 2022-05-04 PROCEDURE — 93000 ELECTROCARDIOGRAM COMPLETE: CPT | Performed by: INTERNAL MEDICINE

## 2022-05-04 PROCEDURE — 99213 OFFICE O/P EST LOW 20 MIN: CPT | Performed by: INTERNAL MEDICINE

## 2022-05-04 RX ORDER — OXCARBAZEPINE 600 MG/1
TABLET, FILM COATED ORAL
COMMUNITY
Start: 2022-04-12 | End: 2022-05-04 | Stop reason: ALTCHOICE

## 2022-05-04 RX ORDER — NEBIVOLOL 10 MG/1
10 TABLET ORAL DAILY
Qty: 90 TABLET | Refills: 3 | Status: SHIPPED | OUTPATIENT
Start: 2022-05-04

## 2022-05-04 NOTE — PROGRESS NOTES
Cardiology Office Visit      Encounter Date:  05/04/2022    Patient ID:   Olga Garcia is a 46 y.o. female.    Reason For Followup:  Palpitations    Brief Clinical History:  Dear Ivelisse Goncalves APRN    I had the pleasure of seeing Olga Garcia today. As you are well aware, this is a 46 y.o. female with no established history of ischemic heart disease.  She does have a history of palpitations, hypothyroidism, hypertension, obstructive sleep apnea, anxiety, and hyperlipidemia.  She presents today for follow-up on the above conditions.    Interval History:  She denies any chest pain pressure heaviness or tightness.  She denies any shortness of breath out of character.  She denies any PND orthopnea.  She denies any syncope or near syncope.  Other than some occasional palpitations, she reports feeling well.    As I am sure you are aware, she was having difficulty in the past with palpitations that were the result of multiple PVCs.  She was placed on Bystolic and since has reported a 95% improvement in her symptoms.  Although her blood pressure is elevated today, she reports her blood pressure has responded well to this as well.    Assessment & Plan    Impressions:  Palpitations  Obesity  Obstructive sleep apnea  Hypertension  Hypertensive cardiovascular disease  Hyperlipidemia  Hypothyroidism  Anxiety  Edema    Recommendations:  Continuation of her current cardiovascular regimen at the present time.     This includes statin, and antihypertensives  Consider compression socks  Follow-up in 1 years time sooner should there be difficulties.      Diagnoses and all orders for this visit:    1. Palpitations (Primary)  -     ECG 12 Lead    2. Primary hypertension  -     ECG 12 Lead    3. Pure hypercholesterolemia  -     ECG 12 Lead    4. Peripheral edema  -     ECG 12 Lead    Other orders  -     nebivolol (Bystolic) 10 MG tablet; Take 1 tablet by mouth Daily.  Dispense: 90 tablet; Refill:  "3          Objective:    Vitals:  Vitals:    05/04/22 1503   BP: 150/90   Pulse: 96   SpO2: 98%   Weight: 116 kg (256 lb)   Height: 160 cm (63\")     Body mass index is 45.35 kg/m².      Physical Exam:    General: Alert, cooperative, no distress, appears stated age  Head:  Normocephalic, atraumatic, mucous membranes moist  Eyes:  Conjunctiva/corneas clear, EOM's intact     Neck:  Supple,  no bruit    Lungs: Clear to auscultation bilaterally, no wheezes rhonchi rales are noted  Chest wall: No tenderness  Heart::  Regular rate and rhythm, S1 and S2 normal, 1/6 holosystolic murmur.  No rub or gallop  Abdomen: Soft, non-tender, nondistended bowel sounds active.  Obese  Extremities: No cyanosis, clubbing, or edema  Pulses: 2+ and symmetric all extremities  Skin:  No rashes or lesions  Neuro/psych: A&O x3. CN II through XII are grossly intact with appropriate affect      Allergies:  Allergies   Allergen Reactions   • Clarithromycin Irritability       Medication Review:     Current Outpatient Medications:   •  atorvastatin (LIPITOR) 20 MG tablet, Take 1 tablet by mouth Daily., Disp: 90 tablet, Rfl: 1  •  clomiPRAMINE (ANAFRANIL) 50 MG capsule, 5 po q d, Disp: 150 capsule, Rfl: 6  •  clonazePAM (KlonoPIN) 0.5 MG tablet, TK 1 T PO TID PRA, Disp: , Rfl: 5  •  fluticasone (FLONASE) 50 MCG/ACT nasal spray, USE 2 SPRAYS IN EACH NOSTRIL DAILY AS DIRECTED BY PROVIDER, Disp: 48 g, Rfl: 1  •  levothyroxine (SYNTHROID, LEVOTHROID) 75 MCG tablet, TAKE ONE TABLET BY MOUTH DAILY, Disp: 31 tablet, Rfl: 2  •  losartan (COZAAR) 100 MG tablet, TAKE ONE TABLET BY MOUTH DAILY, Disp: 30 tablet, Rfl: 3  •  Multiple Vitamins-Minerals (MULTI COMPLETE PO), Take  by mouth., Disp: , Rfl:   •  nebivolol (Bystolic) 10 MG tablet, Take 1 tablet by mouth Daily., Disp: 90 tablet, Rfl: 3  •  omeprazole (priLOSEC) 20 MG capsule, Take 1 capsule by mouth 2 (Two) Times a Day for 180 days., Disp: 180 capsule, Rfl: 1  •  VRAYLAR 3 MG capsule, TK 1 C PO D, Disp: , " Rfl:     Family History:  Family History   Problem Relation Age of Onset   • Hypertension Mother    • Atrial fibrillation Mother    • Diabetes Mother    • Hypertension Father    • Diabetes Father    • Bipolar disorder Father    • Heart attack Father    • Kidney cancer Father    • Anxiety disorder Sister    • Migraines Sister    • Hypertension Brother    • OCD Daughter        Past Medical History:  Past Medical History:   Diagnosis Date   • Allergic    • Anxiety    • Arrhythmia    • Bipolar 1 disorder (HCC)    • Hyperlipidemia    • Hypertension    • Hypothyroidism    • Obesity    • OCD (obsessive compulsive disorder)    • RASHARD (obstructive sleep apnea)     cpap       Past Surgical History:  Past Surgical History:   Procedure Laterality Date   •  SECTION      x1   • SINUS SURGERY         Social History:  Social History     Socioeconomic History   • Marital status:    • Number of children: 1   Tobacco Use   • Smoking status: Never Smoker   • Smokeless tobacco: Never Used   Vaping Use   • Vaping Use: Never used   Substance and Sexual Activity   • Alcohol use: Yes     Comment: Social   • Drug use: Never   • Sexual activity: Defer       Review of Systems:  The following systems were reviewed as they relate to the cardiovascular system: Constitutional, Eyes, ENT, Cardiovascular, Respiratory, Gastrointestinal, Integumentary, Neurological, Psychiatric, Hematologic, Endocrine, Musculoskeletal, and Genitourinary. The pertinent cardiovascular findings are reported above with all other cardiovascular points within those systems being negative.    Diagnostic Study Review:     Current Electrocardiogram:    ECG 12 Lead    Date/Time: 2022 1:12 PM  Performed by: Kashif Ramírez DO  Authorized by: Kashif Ramírez DO   Comparison: not compared with previous ECG   Previous ECG: no previous ECG available  Comments: Normal sinus rhythm with a ventricular rate of 79 bpm.  Nonspecific  repolarization changes.  Normal QT and QTc intervals.            Laboratory Data:  Lab Results   Component Value Date    GLUCOSE 96 02/08/2021    BUN 11 02/08/2021    CREATININE 0.70 02/08/2021    EGFRIFNONA 91 02/08/2021    BCR 15.7 02/08/2021    K 3.9 02/08/2021    CO2 28.1 02/08/2021    CALCIUM 9.4 02/08/2021    ALBUMIN 4.00 02/08/2021    AST 25 02/08/2021    ALT 31 02/08/2021     Lab Results   Component Value Date    GLUCOSE 96 02/08/2021    CALCIUM 9.4 02/08/2021     02/08/2021    K 3.9 02/08/2021    CO2 28.1 02/08/2021     02/08/2021    BUN 11 02/08/2021    CREATININE 0.70 02/08/2021    EGFRIFNONA 91 02/08/2021    BCR 15.7 02/08/2021    ANIONGAP 7.9 02/08/2021     Lab Results   Component Value Date    WBC 5.27 02/08/2021    HGB 12.0 02/08/2021    HCT 36.2 02/08/2021    MCV 89.8 02/08/2021     02/08/2021     Lab Results   Component Value Date    CHOL 132 02/08/2021    TRIG 57 02/08/2021    HDL 50 02/08/2021    LDL 70 02/08/2021     No results found for: HGBA1C  Lab Results   Component Value Date    INR 1.0 10/18/2017    PROTIME 11.1 10/18/2017       Most Recent Echo:  Results for orders placed during the hospital encounter of 05/19/20    Adult Transthoracic Echo Complete W/ Cont if Necessary Per Protocol    Interpretation Summary  · Left ventricular wall thickness is consistent with mild concentric hypertrophy.  · Estimated EF = 65%.  · Left ventricular systolic function is normal.  · Left atrial cavity size is mildly dilated.  · Mild mitral valve regurgitation is present  · Mild tricuspid valve regurgitation is present.       Most Recent Stress Test:  Results for orders placed during the hospital encounter of 02/02/21    Treadmill Stress Test    Interpretation Summary  · No ECG evidence of myocardial ischemia.Negative clinical evidence of myocardial ischemia. Findings consistent with a normal ECG stress test.  · Reached only 81% of the maximal yesterday controlled heart rate secondary to  physical limitation       Most Recent Cardiac Catheterization:   No results found for this or any previous visit.       NOTE: The following portions of the patient's note were reviewed, confirmed and/or updated this visit as appropriate: History of present illness/Interval history, physical examination, assessment & plan, allergies, current medications, past family history, past medical history, past social history, past surgical history and problem list.

## 2022-05-05 PROCEDURE — 82962 GLUCOSE BLOOD TEST: CPT

## 2022-05-11 ENCOUNTER — LAB (OUTPATIENT)
Dept: FAMILY MEDICINE CLINIC | Facility: CLINIC | Age: 47
End: 2022-05-11

## 2022-05-11 ENCOUNTER — OFFICE VISIT (OUTPATIENT)
Dept: FAMILY MEDICINE CLINIC | Facility: CLINIC | Age: 47
End: 2022-05-11

## 2022-05-11 DIAGNOSIS — Z00.00 PREVENTATIVE HEALTH CARE: Primary | ICD-10-CM

## 2022-05-11 DIAGNOSIS — E55.9 VITAMIN D DEFICIENCY: ICD-10-CM

## 2022-05-11 DIAGNOSIS — Z12.11 SCREENING FOR COLON CANCER: ICD-10-CM

## 2022-05-11 DIAGNOSIS — E03.9 HYPOTHYROIDISM, UNSPECIFIED TYPE: ICD-10-CM

## 2022-05-11 DIAGNOSIS — Z00.00 PHYSICAL EXAM: ICD-10-CM

## 2022-05-11 LAB
25(OH)D3 SERPL-MCNC: 34 NG/ML (ref 30–100)
ALBUMIN SERPL-MCNC: 4.1 G/DL (ref 3.5–5.2)
ALBUMIN/GLOB SERPL: 1.5 G/DL
ALP SERPL-CCNC: 87 U/L (ref 39–117)
ALT SERPL W P-5'-P-CCNC: 20 U/L (ref 1–33)
ANION GAP SERPL CALCULATED.3IONS-SCNC: 10.2 MMOL/L (ref 5–15)
AST SERPL-CCNC: 19 U/L (ref 1–32)
BASOPHILS # BLD AUTO: 0.02 10*3/MM3 (ref 0–0.2)
BASOPHILS NFR BLD AUTO: 0.5 % (ref 0–1.5)
BILIRUB SERPL-MCNC: 0.2 MG/DL (ref 0–1.2)
BUN SERPL-MCNC: 10 MG/DL (ref 6–20)
BUN/CREAT SERPL: 12.8 (ref 7–25)
CALCIUM SPEC-SCNC: 9 MG/DL (ref 8.6–10.5)
CHLORIDE SERPL-SCNC: 99 MMOL/L (ref 98–107)
CHOLEST SERPL-MCNC: 122 MG/DL (ref 0–200)
CO2 SERPL-SCNC: 26.8 MMOL/L (ref 22–29)
CREAT SERPL-MCNC: 0.78 MG/DL (ref 0.57–1)
DEPRECATED RDW RBC AUTO: 43.8 FL (ref 37–54)
EGFRCR SERPLBLD CKD-EPI 2021: 95.6 ML/MIN/1.73
EOSINOPHIL # BLD AUTO: 0.06 10*3/MM3 (ref 0–0.4)
EOSINOPHIL NFR BLD AUTO: 1.5 % (ref 0.3–6.2)
ERYTHROCYTE [DISTWIDTH] IN BLOOD BY AUTOMATED COUNT: 12.6 % (ref 12.3–15.4)
GLOBULIN UR ELPH-MCNC: 2.7 GM/DL
GLUCOSE SERPL-MCNC: 88 MG/DL (ref 65–99)
HBA1C MFR BLD: 5.4 % (ref 3.5–5.6)
HCT VFR BLD AUTO: 38.7 % (ref 34–46.6)
HDLC SERPL-MCNC: 34 MG/DL (ref 40–60)
HGB BLD-MCNC: 12.3 G/DL (ref 12–15.9)
IMM GRANULOCYTES # BLD AUTO: 0.01 10*3/MM3 (ref 0–0.05)
IMM GRANULOCYTES NFR BLD AUTO: 0.2 % (ref 0–0.5)
LDLC SERPL CALC-MCNC: 71 MG/DL (ref 0–100)
LDLC/HDLC SERPL: 2.09 {RATIO}
LYMPHOCYTES # BLD AUTO: 0.96 10*3/MM3 (ref 0.7–3.1)
LYMPHOCYTES NFR BLD AUTO: 23.6 % (ref 19.6–45.3)
MCH RBC QN AUTO: 30.2 PG (ref 26.6–33)
MCHC RBC AUTO-ENTMCNC: 31.8 G/DL (ref 31.5–35.7)
MCV RBC AUTO: 95.1 FL (ref 79–97)
MONOCYTES # BLD AUTO: 0.26 10*3/MM3 (ref 0.1–0.9)
MONOCYTES NFR BLD AUTO: 6.4 % (ref 5–12)
NEUTROPHILS NFR BLD AUTO: 2.76 10*3/MM3 (ref 1.7–7)
NEUTROPHILS NFR BLD AUTO: 67.8 % (ref 42.7–76)
NRBC BLD AUTO-RTO: 0.2 /100 WBC (ref 0–0.2)
PLATELET # BLD AUTO: 213 10*3/MM3 (ref 140–450)
PMV BLD AUTO: 10.6 FL (ref 6–12)
POTASSIUM SERPL-SCNC: 3.7 MMOL/L (ref 3.5–5.2)
PROT SERPL-MCNC: 6.8 G/DL (ref 6–8.5)
RBC # BLD AUTO: 4.07 10*6/MM3 (ref 3.77–5.28)
SODIUM SERPL-SCNC: 136 MMOL/L (ref 136–145)
T4 FREE SERPL-MCNC: 1.13 NG/DL (ref 0.93–1.7)
TRIGL SERPL-MCNC: 85 MG/DL (ref 0–150)
TSH SERPL DL<=0.05 MIU/L-ACNC: 0.69 UIU/ML (ref 0.27–4.2)
VLDLC SERPL-MCNC: 17 MG/DL (ref 5–40)
WBC NRBC COR # BLD: 4.07 10*3/MM3 (ref 3.4–10.8)

## 2022-05-11 PROCEDURE — 83036 HEMOGLOBIN GLYCOSYLATED A1C: CPT | Performed by: NURSE PRACTITIONER

## 2022-05-11 PROCEDURE — 84439 ASSAY OF FREE THYROXINE: CPT | Performed by: NURSE PRACTITIONER

## 2022-05-11 PROCEDURE — 99396 PREV VISIT EST AGE 40-64: CPT | Performed by: NURSE PRACTITIONER

## 2022-05-11 PROCEDURE — 80050 GENERAL HEALTH PANEL: CPT | Performed by: NURSE PRACTITIONER

## 2022-05-11 PROCEDURE — 82306 VITAMIN D 25 HYDROXY: CPT | Performed by: NURSE PRACTITIONER

## 2022-05-11 PROCEDURE — 36415 COLL VENOUS BLD VENIPUNCTURE: CPT | Performed by: NURSE PRACTITIONER

## 2022-05-11 PROCEDURE — 80061 LIPID PANEL: CPT | Performed by: NURSE PRACTITIONER

## 2022-05-11 NOTE — PROGRESS NOTES
Chief Complaint  Annual Exam  Subjective        Olga Garcia presents to [facility]  Pt comes in today for routine physical.   Pt tested positive for covid last week on 5/5. Feeling better now.   Sees Dr. house for routine pap and mammogram. utd on both.  Due for colonoscopy  Trying to work on weight loss. Just joined  about 1 week ago.   Not exercising yet.        Objective     Vital Signs:   There were no vitals taken for this visit.      BP Readings from Last 3 Encounters:   05/05/22 139/83   05/04/22 150/90   12/08/21 126/76       Wt Readings from Last 3 Encounters:   05/05/22 122 kg (268 lb)   05/04/22 116 kg (256 lb)   12/08/21 119 kg (262 lb)     Physical Exam  Constitutional:       Appearance: She is well-developed. She is obese.   HENT:      Head: Normocephalic.   Eyes:      Conjunctiva/sclera: Conjunctivae normal.      Pupils: Pupils are equal, round, and reactive to light.   Neck:      Thyroid: No thyromegaly.   Cardiovascular:      Rate and Rhythm: Normal rate and regular rhythm.      Heart sounds: No murmur heard.  Pulmonary:      Effort: Pulmonary effort is normal.      Breath sounds: Normal breath sounds.   Abdominal:      General: Bowel sounds are normal.      Palpations: Abdomen is soft. There is no mass.      Tenderness: There is no abdominal tenderness.   Musculoskeletal:      Cervical back: Neck supple.   Skin:     General: Skin is warm and dry.      Findings: No lesion.   Neurological:      Mental Status: She is alert and oriented to person, place, and time.   Psychiatric:         Behavior: Behavior normal.        Result Review :                 Assessment and Plan    Diagnoses and all orders for this visit:    1. Preventative health care (Primary)    2. Screening for colon cancer  -     Ambulatory Referral For Screening Colonoscopy    3. Hypothyroidism, unspecified type  -     TSH  -     T4, Free    4. Vitamin D deficiency  -     Vitamin D 25 Hydroxy    5.  Physical exam  -     CBC Auto Differential  -     Comprehensive Metabolic Panel  -     Lipid Panel  -     Hemoglobin A1c    check labs  Colonoscopy  Discussed importance of regular exercise and recommended starting or continuing a regular exercise program for good health. The patient was also encouraged to lose weight for better health.   During this visit for their annual exam, we reviewed their personal history, social history and family history. We went over their medications and all the recommended health maintenance items for their age group. They were given the opportunity to ask questions and discuss other concerns.         Follow Up   Return in about 6 months (around 11/11/2022) for HTN follow up.  Patient was given instructions and counseling regarding her condition or for health maintenance advice. Please see specific information pulled into the AVS if appropriate.

## 2022-05-27 RX ORDER — NEBIVOLOL 10 MG/1
TABLET ORAL
Qty: 30 TABLET | OUTPATIENT
Start: 2022-05-27

## 2022-06-20 RX ORDER — ATORVASTATIN CALCIUM 20 MG/1
TABLET, FILM COATED ORAL
Qty: 30 TABLET | Refills: 3 | Status: SHIPPED | OUTPATIENT
Start: 2022-06-20 | End: 2022-10-17

## 2022-07-20 RX ORDER — LOSARTAN POTASSIUM 100 MG/1
TABLET ORAL
Qty: 30 TABLET | Refills: 3 | Status: SHIPPED | OUTPATIENT
Start: 2022-07-20 | End: 2022-07-26

## 2022-07-26 RX ORDER — LOSARTAN POTASSIUM 100 MG/1
TABLET ORAL
Qty: 90 TABLET | Refills: 1 | Status: SHIPPED | OUTPATIENT
Start: 2022-07-26 | End: 2022-11-21

## 2022-08-17 ENCOUNTER — OFFICE VISIT (OUTPATIENT)
Dept: FAMILY MEDICINE CLINIC | Facility: CLINIC | Age: 47
End: 2022-08-17

## 2022-08-17 VITALS
TEMPERATURE: 96.2 F | SYSTOLIC BLOOD PRESSURE: 138 MMHG | DIASTOLIC BLOOD PRESSURE: 80 MMHG | HEART RATE: 100 BPM | BODY MASS INDEX: 46.6 KG/M2 | HEIGHT: 63 IN | WEIGHT: 263 LBS | RESPIRATION RATE: 18 BRPM | OXYGEN SATURATION: 93 %

## 2022-08-17 DIAGNOSIS — J01.00 ACUTE NON-RECURRENT MAXILLARY SINUSITIS: Primary | ICD-10-CM

## 2022-08-17 PROCEDURE — 99213 OFFICE O/P EST LOW 20 MIN: CPT | Performed by: INTERNAL MEDICINE

## 2022-08-17 RX ORDER — HYDROCODONE BITARTRATE AND HOMATROPINE METHYLBROMIDE ORAL SOLUTION 5; 1.5 MG/5ML; MG/5ML
5 LIQUID ORAL EVERY 8 HOURS PRN
Qty: 120 ML | Refills: 0 | Status: SHIPPED | OUTPATIENT
Start: 2022-08-17

## 2022-08-17 RX ORDER — AMOXICILLIN 875 MG/1
875 TABLET, COATED ORAL 2 TIMES DAILY
Qty: 20 TABLET | Refills: 0 | Status: SHIPPED | OUTPATIENT
Start: 2022-08-17 | End: 2022-08-27

## 2022-08-17 RX ORDER — OXCARBAZEPINE 600 MG/1
TABLET, FILM COATED ORAL
COMMUNITY

## 2022-09-20 RX ORDER — LEVOTHYROXINE SODIUM 0.07 MG/1
TABLET ORAL
Qty: 31 TABLET | Refills: 2 | Status: SHIPPED | OUTPATIENT
Start: 2022-09-20

## 2022-09-27 ENCOUNTER — TELEPHONE (OUTPATIENT)
Dept: FAMILY MEDICINE CLINIC | Facility: CLINIC | Age: 47
End: 2022-09-27

## 2022-09-27 NOTE — TELEPHONE ENCOUNTER
Caller: SULEMA CORNEJO    Relationship to patient: Emergency Contact    Best call back number: [phone]    Chief complaint: UNABLE TOT WARM TRANSFER AND NO SAME DAY APPOINTMENTS    Type of visit: SAME DAY    Requested date: AFTERNOON(SHE IS A )    Additional notes:SORE THROAT,CONGESTION,LOST HER VOICE

## 2022-09-27 NOTE — TELEPHONE ENCOUNTER
Spoke with patient's  and scheduled a 4pm appt with [facility] for tomorrow.  He will check with patient and call back if she wants to go to the [facility] today.

## 2022-10-17 RX ORDER — ATORVASTATIN CALCIUM 20 MG/1
TABLET, FILM COATED ORAL
Qty: 30 TABLET | Refills: 3 | Status: SHIPPED | OUTPATIENT
Start: 2022-10-17 | End: 2023-02-15

## 2022-11-21 RX ORDER — LOSARTAN POTASSIUM 100 MG/1
TABLET ORAL
Qty: 30 TABLET | Refills: 2 | Status: SHIPPED | OUTPATIENT
Start: 2022-11-21

## 2023-01-05 ENCOUNTER — TELEPHONE (OUTPATIENT)
Dept: FAMILY MEDICINE CLINIC | Facility: CLINIC | Age: 48
End: 2023-01-05
Payer: COMMERCIAL

## 2023-01-05 NOTE — TELEPHONE ENCOUNTER
Pharmacy Name:  [facility]    Pharmacy representative phone number: [phone]  REF #[id number]    What medication are you calling in regards to: OMEPRAZOLE    What question does the pharmacy have: MISSING CLINICAL INFORMATION FOR THE PRIOR AUTH FOR THIS MEDICATION, PLEASE CALL TO FILL IN MISSING INFO OR IT WILL BE DENIED.     Who is the provider that prescribed the medication: KT UREÑA

## 2023-01-06 NOTE — TELEPHONE ENCOUNTER
PATIENT IS CALLING TO CHECK THE STATUS OF THE PRIOR AUTHORIZATION FOR THE FOLLOWING MEDICATION.  SHE STATES SHE IS ALMOST OUT OF THE MEDICATION.    OMEPRAZOLE     [id number] - [place], [place] - [address]  - [phone]  - [phone]   [phone]    PLEASE ADVISE.

## 2023-01-06 NOTE — TELEPHONE ENCOUNTER
Hub to read        This is on Omeprazole.  They denied it. I sent back to pharmacy today.   Patient needs to check her policy on what they prefer.  Omeprazole is the cheapest but insurance did not give us a formulary list to choose from.    Patient has mychart, she should be able to read this as well.

## 2023-02-06 RX ORDER — OMEPRAZOLE 20 MG/1
CAPSULE, DELAYED RELEASE ORAL
Qty: 60 CAPSULE | Refills: 3 | Status: SHIPPED | OUTPATIENT
Start: 2023-02-06

## 2023-02-15 RX ORDER — ATORVASTATIN CALCIUM 20 MG/1
TABLET, FILM COATED ORAL
Qty: 30 TABLET | Refills: 3 | Status: SHIPPED | OUTPATIENT
Start: 2023-02-15

## 2023-03-14 ENCOUNTER — TELEPHONE (OUTPATIENT)
Dept: FAMILY MEDICINE CLINIC | Facility: CLINIC | Age: 48
End: 2023-03-14
Payer: COMMERCIAL

## 2023-03-14 NOTE — TELEPHONE ENCOUNTER
Spoke with patient and explained that [facility] is out of the office for the next 10 days.  Scheduled an appt with Pat on 3/16/2023 at 4pm.

## 2023-03-14 NOTE — TELEPHONE ENCOUNTER
"Caller: Olga Garcia \"Jazmin Garcia\"    Relationship to patient: Self    Best call back number: [phone]    Chief complaint:CELLULITUS/ LEG HOT TO TOUCH /SWOLLEN    Type of visit: SAME DAY/OFFICE VISIT    Requested date: ANY DAY AFTER 4 PM    Additional notes:PATIENT WAS SEEN AT [facility] 3/14/2023 AND ADVISED TO SEE PCP  "

## 2023-03-16 ENCOUNTER — OFFICE VISIT (OUTPATIENT)
Dept: FAMILY MEDICINE CLINIC | Facility: CLINIC | Age: 48
End: 2023-03-16
Payer: COMMERCIAL

## 2023-03-16 ENCOUNTER — LAB (OUTPATIENT)
Dept: FAMILY MEDICINE CLINIC | Facility: CLINIC | Age: 48
End: 2023-03-16
Payer: COMMERCIAL

## 2023-03-16 VITALS
OXYGEN SATURATION: 100 % | SYSTOLIC BLOOD PRESSURE: 112 MMHG | BODY MASS INDEX: 49.61 KG/M2 | HEART RATE: 66 BPM | WEIGHT: 280 LBS | HEIGHT: 63 IN | DIASTOLIC BLOOD PRESSURE: 80 MMHG

## 2023-03-16 DIAGNOSIS — R60.0 EDEMA LEG: ICD-10-CM

## 2023-03-16 DIAGNOSIS — R60.0 EDEMA LEG: Primary | ICD-10-CM

## 2023-03-16 PROCEDURE — 99213 OFFICE O/P EST LOW 20 MIN: CPT | Performed by: NURSE PRACTITIONER

## 2023-03-16 PROCEDURE — 36415 COLL VENOUS BLD VENIPUNCTURE: CPT

## 2023-03-16 PROCEDURE — 80053 COMPREHEN METABOLIC PANEL: CPT | Performed by: NURSE PRACTITIONER

## 2023-03-16 RX ORDER — NAPROXEN 500 MG/1
TABLET ORAL
COMMUNITY
Start: 2022-12-05

## 2023-03-16 RX ORDER — FUROSEMIDE 20 MG/1
20 TABLET ORAL DAILY
Qty: 7 TABLET | Refills: 0 | Status: SHIPPED | OUTPATIENT
Start: 2023-03-16

## 2023-03-16 NOTE — PROGRESS NOTES
Answers for HPI/ROS submitted by the patient on 3/15/2023  Please describe your symptoms.: Edema in lower part of legs. Doctor at Urgent Care suggested appointment to get on a diuretic.  Have you had these symptoms before?: Yes  How long have you been having these symptoms?: Greater than 2 weeks  Please list any medications you are currently taking for this condition.: None  Please describe any probable cause for these symptoms. : Overweight, salt intake, not enough water  What is the primary reason for your visit?: Other    Chief Complaint  Leg Swelling (CC: pt states it has been hot to touch and swelling and painful for a long time. 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 Been so tender that she went to urgent care.)    Subjective          Olga Garcia presents to [facility]  History of Present Illness    Is here today with c/o foot and ankle edema  This has been a chronic problem for her and she has been on a diuretic in the past but is unable to take it routinely as she is a  and could not run to the bathroom    She endorses that she does not drink enough water, that she needs to lose weight    Some days her legs are more swollen than others  Sometimes some of her shoes will not fit correctly    She sees cardiology, Dr. Mcadonald - has had cardiac work up in past    Mother with recent diagnosis of venous reflux and varicose veins    Encouraged to try support hose      Review of Systems   Constitutional: Positive for fatigue. Negative for appetite change and fever.   Respiratory: Negative.  Negative for cough, shortness of breath and wheezing.    Cardiovascular: Positive for palpitations and leg swelling. Negative for chest pain.        Edema of b/l calf and feet right > left with tenderness to palpation on both sides    Endorses that she does have occasional palpitations - has been evaluated for  "  Gastrointestinal: Negative.  Negative for diarrhea, nausea and vomiting.   Genitourinary: Negative.  Negative for dysuria, frequency and urgency.   Neurological: Negative.  Negative for dizziness, weakness and headaches.   Psychiatric/Behavioral: Positive for dysphoric mood. The patient is nervous/anxious.      Objective   Vital Signs:  /80   Pulse 66   Ht 160 cm (63\")   Wt 127 kg (280 lb)   SpO2 100%   BMI 49.60 kg/m²     BP Readings from Last 3 Encounters:   03/16/23 112/80   03/14/23 133/85   08/17/22 138/80        Wt Readings from Last 3 Encounters:   03/16/23 127 kg (280 lb)   03/14/23 127 kg (280 lb)   08/17/22 119 kg (263 lb)              Physical Exam  Vitals reviewed.   Constitutional:       Appearance: Normal appearance. She is obese.   Cardiovascular:      Rate and Rhythm: Normal rate and regular rhythm.      Pulses: Normal pulses.      Heart sounds: Normal heart sounds.   Pulmonary:      Effort: Pulmonary effort is normal.      Breath sounds: Normal breath sounds.   Musculoskeletal:      Cervical back: Neck supple.      Right lower leg: Edema present.      Left lower leg: Edema present.   Skin:     General: Skin is warm.   Neurological:      Mental Status: She is alert and oriented to person, place, and time.        Result Review :                 Assessment and Plan    Diagnoses and all orders for this visit:    1. Edema leg (Primary)  -     furosemide (Lasix) 20 MG tablet; Take 1 tablet by mouth Daily.  Dispense: 7 tablet; Refill: 0  -     Comprehensive metabolic panel; Future  -     Duplex Venous Lower Extremity - Right CAR; Future    will call her mother's vascular specialist and see him       Follow Up   Return if symptoms worsen or fail to improve.  Patient was given instructions and counseling regarding her condition or for health maintenance advice. Please see specific information pulled into the AVS if appropriate.       "

## 2023-03-17 ENCOUNTER — TELEPHONE (OUTPATIENT)
Dept: FAMILY MEDICINE CLINIC | Facility: CLINIC | Age: 48
End: 2023-03-17
Payer: COMMERCIAL

## 2023-03-17 LAB
ALBUMIN SERPL-MCNC: 4 G/DL (ref 3.5–5.2)
ALBUMIN/GLOB SERPL: 1.3 G/DL
ALP SERPL-CCNC: 79 U/L (ref 39–117)
ALT SERPL W P-5'-P-CCNC: 22 U/L (ref 1–33)
ANION GAP SERPL CALCULATED.3IONS-SCNC: 7.7 MMOL/L (ref 5–15)
AST SERPL-CCNC: 13 U/L (ref 1–32)
BILIRUB SERPL-MCNC: 0.2 MG/DL (ref 0–1.2)
BUN SERPL-MCNC: 12 MG/DL (ref 6–20)
BUN/CREAT SERPL: 12.8 (ref 7–25)
CALCIUM SPEC-SCNC: 9.7 MG/DL (ref 8.6–10.5)
CHLORIDE SERPL-SCNC: 100 MMOL/L (ref 98–107)
CO2 SERPL-SCNC: 29.3 MMOL/L (ref 22–29)
CREAT SERPL-MCNC: 0.94 MG/DL (ref 0.57–1)
EGFRCR SERPLBLD CKD-EPI 2021: 75.9 ML/MIN/1.73
GLOBULIN UR ELPH-MCNC: 3.2 GM/DL
GLUCOSE SERPL-MCNC: 94 MG/DL (ref 65–99)
POTASSIUM SERPL-SCNC: 3.7 MMOL/L (ref 3.5–5.2)
PROT SERPL-MCNC: 7.2 G/DL (ref 6–8.5)
SODIUM SERPL-SCNC: 137 MMOL/L (ref 136–145)

## 2023-03-17 NOTE — TELEPHONE ENCOUNTER
"  Caller: Olga Garcia \"Jazmin Garcia\"    Relationship: Self    Best call back number: [phone]    What is the best time to reach you: ANY    Who are you requesting to speak with (clinical staff, provider,  specific staff member): CLINICAL    Do you know the name of the person who called:     What was the call regarding: PATIENT CALLED [facility] ABOUT ULTRASOUND. SHE WAS ADVISED THAT THEY WOULDN'T BE ABLE TO DO UNTIL 2 WEEKS FROM. PATIENT WANTS TO LET PCP KNOW AND SEE IF WAITING 2 WEEKS TO GET ULTRASOUND IS OKAY.    Do you require a callback: YES        "

## 2023-03-18 NOTE — TELEPHONE ENCOUNTER
Please ask Pat what she was looking for with the order- if DVT then should be ordered stat at [facility] and get done on Monday   if reflux then wrong order and can change it   Detail Level: Simple

## 2023-03-20 ENCOUNTER — TELEPHONE (OUTPATIENT)
Dept: FAMILY MEDICINE CLINIC | Facility: CLINIC | Age: 48
End: 2023-03-20
Payer: COMMERCIAL

## 2023-03-20 NOTE — TELEPHONE ENCOUNTER
"  Caller: Olga Garcia \"Jazmin Garcia\"     Relationship: [unfilled]     Best call back number:[phone]    What is your medical concern? STARTED A WATER PILL LAST WEEK AND IT HAS MADE HER DIZZY AND CANT TAKE IT AND WORK.     How long has this issue been going on? SINCE 3.16.23    Is your provider already aware of this issue? NO          "

## 2023-03-20 NOTE — TELEPHONE ENCOUNTER
Will you please see if this can be scheduled at [facility], thank you

## 2023-03-21 DIAGNOSIS — R60.0 EDEMA LEG: ICD-10-CM

## 2023-03-22 ENCOUNTER — TELEPHONE (OUTPATIENT)
Dept: FAMILY MEDICINE CLINIC | Facility: CLINIC | Age: 48
End: 2023-03-22
Payer: COMMERCIAL

## 2023-03-22 NOTE — TELEPHONE ENCOUNTER
Please advise her to stop the medication.  She can follow up with her PCP or her cardiologist if her swelling persists.  Thank you.

## 2023-03-22 NOTE — TELEPHONE ENCOUNTER
"  Caller: Olga Garcia \"Jazmin Garcia\"    Relationship: Self    Best call back number: [phone]    What was the call regarding: PATIENT HAS QUESTIONS REGARDING LAB RESULTS ON 3/16/23.  ALSO REGARDING THE ULTRASOUND FROM [facility].    PLEASE ADVISE    Do you require a callback: YES           "

## 2023-05-03 ENCOUNTER — OFFICE VISIT (OUTPATIENT)
Dept: CARDIOLOGY | Facility: CLINIC | Age: 48
End: 2023-05-03
Payer: COMMERCIAL

## 2023-05-03 VITALS
BODY MASS INDEX: 49.61 KG/M2 | HEART RATE: 79 BPM | OXYGEN SATURATION: 99 % | WEIGHT: 280 LBS | DIASTOLIC BLOOD PRESSURE: 88 MMHG | SYSTOLIC BLOOD PRESSURE: 160 MMHG | HEIGHT: 63 IN

## 2023-05-03 DIAGNOSIS — I10 PRIMARY HYPERTENSION: Primary | ICD-10-CM

## 2023-05-03 DIAGNOSIS — E78.2 MIXED HYPERLIPIDEMIA: ICD-10-CM

## 2023-05-03 DIAGNOSIS — R60.0 EDEMA LEG: ICD-10-CM

## 2023-05-03 PROCEDURE — 93000 ELECTROCARDIOGRAM COMPLETE: CPT | Performed by: INTERNAL MEDICINE

## 2023-05-03 PROCEDURE — 99214 OFFICE O/P EST MOD 30 MIN: CPT | Performed by: INTERNAL MEDICINE

## 2023-05-03 RX ORDER — FUROSEMIDE 20 MG/1
20 TABLET ORAL DAILY
Qty: 90 TABLET | Refills: 3 | Status: SHIPPED | OUTPATIENT
Start: 2023-05-03

## 2023-05-03 NOTE — LETTER
May 3, 2023     ISAI Valladares  [address]  [place] [address]    Patient: Olga Garcia   YOB: 1975   Date of Visit: 5/3/2023       Dear ISAI Goncalves:    Thank you for referring Olga Garcia to me for evaluation. Below are the relevant portions of my assessment and plan of care.    If you have questions, please do not hesitate to call me. I look forward to following Olga along with you.         Sincerely,        Kashif Ramírez DO        CC: No Recipients    Kashif Ramírez DO  05/03/23 1658  Signed  Cardiology Office Visit      Encounter Date:  05/03/2023    Patient ID:   Olga Garcia is a 47 y.o. female.    Reason For Followup:  Palpitations    Brief Clinical History:  Dear Ivelisse Goncalves APRN    I had the pleasure of seeing Olga Garcia today. As you are well aware, this is a 47 y.o. female with no established history of ischemic heart disease.  She does have a history of palpitations, hypothyroidism, hypertension, obstructive sleep apnea, anxiety, and hyperlipidemia.  She presents today for follow-up on the above conditions.    Interval History:  She denies any chest pain pressure heaviness or tightness.  She denies any shortness of breath out of character.  She denies any PND orthopnea.  She denies any syncope or near syncope.  Other than some occasional palpitations, she reports feeling well.    She is having some lower extremity edema.  She reports that she does not drink excessive fluid but does consume quite a bit of sodium.  We discussed options and we will have her wear some compression socks and try some diuretics.  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  She will try starting on the weekends and see how long the effect will hold.  If her diuresis time is 4 hours or so then perhaps she can take it  "when she gets home from work daily.  We will follow-up with her in a couple of months to see how she is doing.    Her blood pressure is elevated today.  She reports that she was in a rush to get here after leaving school.    As I am sure you are aware, she was having difficulty in the past with palpitations that were the result of multiple PVCs.  She was placed on Bystolic and since has reported a 95% improvement in her symptoms.  Although her blood pressure is elevated today, she reports her blood pressure has responded well to this as well.    Assessment & Plan    Impressions:  Palpitations  Obesity  Obstructive sleep apnea  Hypertension  Hypertensive cardiovascular disease  Hyperlipidemia  Hypothyroidism  Anxiety  Edema    Recommendations:  Continuation of her current cardiovascular regimen at the present time.     This includes statin, and antihypertensives  Utilize compression socks  Lasix 20 mg daily  Follow-up in 2 months time.  Can consider cardiac testing if still symptomatic at that time.      Diagnoses and all orders for this visit:    1. Primary hypertension (Primary)  -     ECG 12 Lead    2. Edema leg  -     furosemide (Lasix) 20 MG tablet; Take 1 tablet by mouth Daily.  Dispense: 90 tablet; Refill: 3  -     ECG 12 Lead    3. Mixed hyperlipidemia  -     ECG 12 Lead          Objective:    Vitals:  Vitals:    05/03/23 1500   BP: 160/88   BP Location: Left arm   Patient Position: Lying   Pulse: 79   SpO2: 99%   Weight: 127 kg (280 lb)   Height: 160 cm (63\")     Body mass index is 49.6 kg/m².      Physical Exam:    General: Alert, cooperative, no distress, appears stated age  Head:  Normocephalic, atraumatic, mucous membranes moist  Eyes:  Conjunctiva/corneas clear, EOM's intact     Neck:  Supple,  no bruit    Lungs: Clear to auscultation bilaterally, no wheezes rhonchi rales are noted  Chest wall: No tenderness  Heart::  Regular rate and rhythm, S1 and S2 normal, 1/6 holosystolic murmur.  No rub or " gallop  Abdomen: Soft, non-tender, nondistended bowel sounds active.  Obese  Extremities: No cyanosis, clubbing.  2+ edema  Pulses: 2+ and symmetric all extremities  Skin:  No rashes or lesions  Neuro/psych: A&O x3. CN II through XII are grossly intact with appropriate affect      Allergies:  Allergies   Allergen Reactions   • Clarithromycin Irritability       Medication Review:     Current Outpatient Medications:   •  atorvastatin (LIPITOR) 20 MG tablet, TAKE ONE TABLET BY MOUTH DAILY, Disp: 30 tablet, Rfl: 3  •  clomiPRAMINE (ANAFRANIL) 50 MG capsule, 5 po q d, Disp: 150 capsule, Rfl: 6  •  clonazePAM (KlonoPIN) 0.5 MG tablet, TK 1 T PO TID PRA, Disp: , Rfl: 5  •  fluticasone (FLONASE) 50 MCG/ACT nasal spray, USE 2 SPRAYS IN EACH NOSTRIL DAILY AS DIRECTED BY PROVIDER, Disp: 48 g, Rfl: 1  •  furosemide (Lasix) 20 MG tablet, Take 1 tablet by mouth Daily., Disp: 90 tablet, Rfl: 3  •  HYDROcodone Bit-Homatrop MBr (HYCODAN) 5-1.5 MG/5ML solution, Take 5 mL by mouth Every 8 (Eight) Hours As Needed for Cough., Disp: 120 mL, Rfl: 0  •  levothyroxine (SYNTHROID, LEVOTHROID) 75 MCG tablet, TAKE ONE TABLET BY MOUTH DAILY, Disp: 31 tablet, Rfl: 2  •  losartan (COZAAR) 100 MG tablet, TAKE ONE TABLET BY MOUTH DAILY, Disp: 30 tablet, Rfl: 2  •  Multiple Vitamins-Minerals (MULTI COMPLETE PO), Take  by mouth., Disp: , Rfl:   •  naproxen (NAPROSYN) 500 MG tablet, , Disp: , Rfl:   •  nebivolol (Bystolic) 10 MG tablet, Take 1 tablet by mouth Daily., Disp: 90 tablet, Rfl: 3  •  omeprazole (priLOSEC) 20 MG capsule, TAKE ONE CAPSULE BY MOUTH TWICE A DAY, Disp: 60 capsule, Rfl: 3  •  OXcarbazepine (TRILEPTAL) 600 MG tablet, , Disp: , Rfl:   •  VRAYLAR 3 MG capsule, TK 1 C PO D, Disp: , Rfl:     Family History:  Family History   Problem Relation Age of Onset   • Hypertension Mother    • Atrial fibrillation Mother    • Diabetes Mother    • Arthritis Mother    • Hypertension Father    • Diabetes Father    • Bipolar disorder Father    •  Kidney cancer Father    • Anxiety disorder Father    • Anxiety disorder Sister    • Migraines Sister    • Hypertension Brother    • OCD Daughter        Past Medical History:  Past Medical History:   Diagnosis Date   • Allergic    • Anxiety    • Arrhythmia    • Arthritis    • Bipolar 1 disorder    • Depression    • GERD (gastroesophageal reflux disease)    • Hyperlipidemia    • Hypertension    • Hypothyroidism    • Obesity    • OCD (obsessive compulsive disorder)    • RASHARD (obstructive sleep apnea)     cpap       Past Surgical History:  Past Surgical History:   Procedure Laterality Date   •  SECTION      x1   • ENDOMETRIAL ABLATION     • SINUS SURGERY         Social History:  Social History     Socioeconomic History   • Marital status:    • Number of children: 1   Tobacco Use   • Smoking status: Never   • Smokeless tobacco: Never   Vaping Use   • Vaping Use: Never used   Substance and Sexual Activity   • Alcohol use: Not Currently     Comment: Social   • Drug use: Never   • Sexual activity: Yes     Partners: Male     Birth control/protection: Condom       Review of Systems:  The following systems were reviewed as they relate to the cardiovascular system: Constitutional, Eyes, ENT, Cardiovascular, Respiratory, Gastrointestinal, Integumentary, Neurological, Psychiatric, Hematologic, Endocrine, Musculoskeletal, and Genitourinary. The pertinent cardiovascular findings are reported above with all other cardiovascular points within those systems being negative.    Diagnostic Study Review:     Current Electrocardiogram:    ECG 12 Lead    Date/Time: 5/3/2023 4:56 PM  Performed by: Kashif Ramírez DO  Authorized by: Kashif Ramírez DO   Comparison: not compared with previous ECG   Previous ECG: no previous ECG available  Comments: Normal sinus rhythm with a ventricular rate of 77 bpm.  Consider left atrial enlargement.  Nonspecific repolarization changes.  Normal QT and QTc  intervals.            Laboratory Data:  Lab Results   Component Value Date    GLUCOSE 94 03/16/2023    BUN 12 03/16/2023    CREATININE 0.94 03/16/2023    EGFRIFNONA 91 02/08/2021    BCR 12.8 03/16/2023    K 3.7 03/16/2023    CO2 29.3 (H) 03/16/2023    CALCIUM 9.7 03/16/2023    ALBUMIN 4.0 03/16/2023    AST 13 03/16/2023    ALT 22 03/16/2023     Lab Results   Component Value Date    GLUCOSE 94 03/16/2023    CALCIUM 9.7 03/16/2023     03/16/2023    K 3.7 03/16/2023    CO2 29.3 (H) 03/16/2023     03/16/2023    BUN 12 03/16/2023    CREATININE 0.94 03/16/2023    EGFRIFNONA 91 02/08/2021    BCR 12.8 03/16/2023    ANIONGAP 7.7 03/16/2023     Lab Results   Component Value Date    WBC 4.07 05/11/2022    HGB 12.3 05/11/2022    HCT 38.7 05/11/2022    MCV 95.1 05/11/2022     05/11/2022     Lab Results   Component Value Date    CHOL 122 05/11/2022    TRIG 85 05/11/2022    HDL 34 (L) 05/11/2022    LDL 71 05/11/2022     Lab Results   Component Value Date    HGBA1C 5.4 05/11/2022     Lab Results   Component Value Date    INR 1.0 10/18/2017    PROTIME 11.1 10/18/2017       Most Recent Echo:  Results for orders placed during the hospital encounter of 05/19/20    Adult Transthoracic Echo Complete W/ Cont if Necessary Per Protocol    Interpretation Summary  · Left ventricular wall thickness is consistent with mild concentric hypertrophy.  · Estimated EF = 65%.  · Left ventricular systolic function is normal.  · Left atrial cavity size is mildly dilated.  · Mild mitral valve regurgitation is present  · Mild tricuspid valve regurgitation is present.       Most Recent Stress Test:  Results for orders placed during the hospital encounter of 02/02/21    Treadmill Stress Test    Interpretation Summary  · No ECG evidence of myocardial ischemia.Negative clinical evidence of myocardial ischemia. Findings consistent with a normal ECG stress test.  · Reached only 81% of the maximal yesterday controlled heart rate secondary to  physical limitation       Most Recent Cardiac Catheterization:   No results found for this or any previous visit.       NOTE: The following portions of the patient's note were reviewed, confirmed and/or updated this visit as appropriate: History of present illness/Interval history, physical examination, assessment & plan, allergies, current medications, past family history, past medical history, past social history, past surgical history and problem list.

## 2023-05-03 NOTE — PATIENT INSTRUCTIONS
Look into [place] socks     15-20 mm Hg  Decrease sodium intake to 2500 mg or less daily  Furosemide 20 mg daily  Follow-up 2 months

## 2023-05-03 NOTE — PROGRESS NOTES
Cardiology Office Visit      Encounter Date:  05/03/2023    Patient ID:   lOga Garcia is a 47 y.o. female.    Reason For Followup:  Palpitations    Brief Clinical History:  Dear Ivelisse Goncalves APRN    I had the pleasure of seeing Olga Garcia today. As you are well aware, this is a 47 y.o. female with no established history of ischemic heart disease.  She does have a history of palpitations, hypothyroidism, hypertension, obstructive sleep apnea, anxiety, and hyperlipidemia.  She presents today for follow-up on the above conditions.    Interval History:  She denies any chest pain pressure heaviness or tightness.  She denies any shortness of breath out of character.  She denies any PND orthopnea.  She denies any syncope or near syncope.  Other than some occasional palpitations, she reports feeling well.    She is having some lower extremity edema.  She reports that she does not drink excessive fluid but does consume quite a bit of sodium.  We discussed options and we will have her wear some compression socks and try some diuretics.  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  She will try starting on the weekends and see how long the effect will hold.  If her diuresis time is 4 hours or so then perhaps she can take it when she gets home from work daily.  We will follow-up with her in a couple of months to see how she is doing.    Her blood pressure is elevated today.  She reports that she was in a rush to get here after leaving school.    As I am sure you are aware, she was having difficulty in the past with palpitations that were the result of multiple PVCs.  She was placed on Bystolic and since has reported a 95% improvement in her symptoms.  Although her blood pressure is elevated today, she reports her blood pressure has responded well to this as well.    Assessment &  "Plan    Impressions:  Palpitations  Obesity  Obstructive sleep apnea  Hypertension  Hypertensive cardiovascular disease  Hyperlipidemia  Hypothyroidism  Anxiety  Edema    Recommendations:  Continuation of her current cardiovascular regimen at the present time.     This includes statin, and antihypertensives  Utilize compression socks  Lasix 20 mg daily  Follow-up in 2 months time.  Can consider cardiac testing if still symptomatic at that time.      Diagnoses and all orders for this visit:    1. Primary hypertension (Primary)  -     ECG 12 Lead    2. Edema leg  -     furosemide (Lasix) 20 MG tablet; Take 1 tablet by mouth Daily.  Dispense: 90 tablet; Refill: 3  -     ECG 12 Lead    3. Mixed hyperlipidemia  -     ECG 12 Lead          Objective:    Vitals:  Vitals:    05/03/23 1500   BP: 160/88   BP Location: Left arm   Patient Position: Lying   Pulse: 79   SpO2: 99%   Weight: 127 kg (280 lb)   Height: 160 cm (63\")     Body mass index is 49.6 kg/m².      Physical Exam:    General: Alert, cooperative, no distress, appears stated age  Head:  Normocephalic, atraumatic, mucous membranes moist  Eyes:  Conjunctiva/corneas clear, EOM's intact     Neck:  Supple,  no bruit    Lungs: Clear to auscultation bilaterally, no wheezes rhonchi rales are noted  Chest wall: No tenderness  Heart::  Regular rate and rhythm, S1 and S2 normal, 1/6 holosystolic murmur.  No rub or gallop  Abdomen: Soft, non-tender, nondistended bowel sounds active.  Obese  Extremities: No cyanosis, clubbing.  2+ edema  Pulses: 2+ and symmetric all extremities  Skin:  No rashes or lesions  Neuro/psych: A&O x3. CN II through XII are grossly intact with appropriate affect      Allergies:  Allergies   Allergen Reactions   • Clarithromycin Irritability       Medication Review:     Current Outpatient Medications:   •  atorvastatin (LIPITOR) 20 MG tablet, TAKE ONE TABLET BY MOUTH DAILY, Disp: 30 tablet, Rfl: 3  •  clomiPRAMINE (ANAFRANIL) 50 MG capsule, 5 po q d, " Disp: 150 capsule, Rfl: 6  •  clonazePAM (KlonoPIN) 0.5 MG tablet, TK 1 T PO TID PRA, Disp: , Rfl: 5  •  fluticasone (FLONASE) 50 MCG/ACT nasal spray, USE 2 SPRAYS IN EACH NOSTRIL DAILY AS DIRECTED BY PROVIDER, Disp: 48 g, Rfl: 1  •  furosemide (Lasix) 20 MG tablet, Take 1 tablet by mouth Daily., Disp: 90 tablet, Rfl: 3  •  HYDROcodone Bit-Homatrop MBr (HYCODAN) 5-1.5 MG/5ML solution, Take 5 mL by mouth Every 8 (Eight) Hours As Needed for Cough., Disp: 120 mL, Rfl: 0  •  levothyroxine (SYNTHROID, LEVOTHROID) 75 MCG tablet, TAKE ONE TABLET BY MOUTH DAILY, Disp: 31 tablet, Rfl: 2  •  losartan (COZAAR) 100 MG tablet, TAKE ONE TABLET BY MOUTH DAILY, Disp: 30 tablet, Rfl: 2  •  Multiple Vitamins-Minerals (MULTI COMPLETE PO), Take  by mouth., Disp: , Rfl:   •  naproxen (NAPROSYN) 500 MG tablet, , Disp: , Rfl:   •  nebivolol (Bystolic) 10 MG tablet, Take 1 tablet by mouth Daily., Disp: 90 tablet, Rfl: 3  •  omeprazole (priLOSEC) 20 MG capsule, TAKE ONE CAPSULE BY MOUTH TWICE A DAY, Disp: 60 capsule, Rfl: 3  •  OXcarbazepine (TRILEPTAL) 600 MG tablet, , Disp: , Rfl:   •  VRAYLAR 3 MG capsule, TK 1 C PO D, Disp: , Rfl:     Family History:  Family History   Problem Relation Age of Onset   • Hypertension Mother    • Atrial fibrillation Mother    • Diabetes Mother    • Arthritis Mother    • Hypertension Father    • Diabetes Father    • Bipolar disorder Father    • Kidney cancer Father    • Anxiety disorder Father    • Anxiety disorder Sister    • Migraines Sister    • Hypertension Brother    • OCD Daughter        Past Medical History:  Past Medical History:   Diagnosis Date   • Allergic    • Anxiety    • Arrhythmia    • Arthritis    • Bipolar 1 disorder    • Depression    • GERD (gastroesophageal reflux disease)    • Hyperlipidemia    • Hypertension    • Hypothyroidism    • Obesity    • OCD (obsessive compulsive disorder)    • RASHARD (obstructive sleep apnea)     cpap       Past Surgical History:  Past Surgical History:    Procedure Laterality Date   •  SECTION      x1   • ENDOMETRIAL ABLATION     • SINUS SURGERY         Social History:  Social History     Socioeconomic History   • Marital status:    • Number of children: 1   Tobacco Use   • Smoking status: Never   • Smokeless tobacco: Never   Vaping Use   • Vaping Use: Never used   Substance and Sexual Activity   • Alcohol use: Not Currently     Comment: Social   • Drug use: Never   • Sexual activity: Yes     Partners: Male     Birth control/protection: Condom       Review of Systems:  The following systems were reviewed as they relate to the cardiovascular system: Constitutional, Eyes, ENT, Cardiovascular, Respiratory, Gastrointestinal, Integumentary, Neurological, Psychiatric, Hematologic, Endocrine, Musculoskeletal, and Genitourinary. The pertinent cardiovascular findings are reported above with all other cardiovascular points within those systems being negative.    Diagnostic Study Review:     Current Electrocardiogram:    ECG 12 Lead    Date/Time: 5/3/2023 4:56 PM  Performed by: Kashif Ramírez DO  Authorized by: Kashif Ramírez DO   Comparison: not compared with previous ECG   Previous ECG: no previous ECG available  Comments: Normal sinus rhythm with a ventricular rate of 77 bpm.  Consider left atrial enlargement.  Nonspecific repolarization changes.  Normal QT and QTc intervals.            Laboratory Data:  Lab Results   Component Value Date    GLUCOSE 94 2023    BUN 12 2023    CREATININE 0.94 2023    EGFRIFNONA 91 2021    BCR 12.8 2023    K 3.7 2023    CO2 29.3 (H) 2023    CALCIUM 9.7 2023    ALBUMIN 4.0 2023    AST 13 2023    ALT 22 2023     Lab Results   Component Value Date    GLUCOSE 94 2023    CALCIUM 9.7 2023     2023    K 3.7 2023    CO2 29.3 (H) 2023     2023    BUN 12 2023    CREATININE 0.94 2023     EGFRIFNONA 91 02/08/2021    BCR 12.8 03/16/2023    ANIONGAP 7.7 03/16/2023     Lab Results   Component Value Date    WBC 4.07 05/11/2022    HGB 12.3 05/11/2022    HCT 38.7 05/11/2022    MCV 95.1 05/11/2022     05/11/2022     Lab Results   Component Value Date    CHOL 122 05/11/2022    TRIG 85 05/11/2022    HDL 34 (L) 05/11/2022    LDL 71 05/11/2022     Lab Results   Component Value Date    HGBA1C 5.4 05/11/2022     Lab Results   Component Value Date    INR 1.0 10/18/2017    PROTIME 11.1 10/18/2017       Most Recent Echo:  Results for orders placed during the hospital encounter of 05/19/20    Adult Transthoracic Echo Complete W/ Cont if Necessary Per Protocol    Interpretation Summary  · Left ventricular wall thickness is consistent with mild concentric hypertrophy.  · Estimated EF = 65%.  · Left ventricular systolic function is normal.  · Left atrial cavity size is mildly dilated.  · Mild mitral valve regurgitation is present  · Mild tricuspid valve regurgitation is present.       Most Recent Stress Test:  Results for orders placed during the hospital encounter of 02/02/21    Treadmill Stress Test    Interpretation Summary  · No ECG evidence of myocardial ischemia.Negative clinical evidence of myocardial ischemia. Findings consistent with a normal ECG stress test.  · Reached only 81% of the maximal yesterday controlled heart rate secondary to physical limitation       Most Recent Cardiac Catheterization:   No results found for this or any previous visit.       NOTE: The following portions of the patient's note were reviewed, confirmed and/or updated this visit as appropriate: History of present illness/Interval history, physical examination, assessment & plan, allergies, current medications, past family history, past medical history, past social history, past surgical history and problem list.

## 2023-05-06 ENCOUNTER — HOSPITAL ENCOUNTER (EMERGENCY)
Facility: HOSPITAL | Age: 48
Discharge: HOME | End: 2023-05-07
Attending: EMERGENCY MEDICINE
Payer: COMMERCIAL

## 2023-05-06 DIAGNOSIS — F41.9 ANXIETY: ICD-10-CM

## 2023-05-06 DIAGNOSIS — I10 HYPERTENSION, UNSPECIFIED TYPE: Primary | ICD-10-CM

## 2023-05-06 LAB
ALBUMIN SERPL-MCNC: 4.5 G/DL (ref 3.5–5.2)
ALBUMIN/GLOB SERPL: 1.5 G/DL
ALP SERPL-CCNC: 103 U/L (ref 39–117)
ALT SERPL W P-5'-P-CCNC: 23 U/L (ref 1–33)
ANION GAP SERPL CALCULATED.3IONS-SCNC: 10 MMOL/L (ref 5–15)
AST SERPL-CCNC: 22 U/L (ref 1–32)
BASOPHILS # BLD AUTO: 0.1 10*3/MM3 (ref 0–0.2)
BASOPHILS NFR BLD AUTO: 0.7 % (ref 0–1.5)
BILIRUB SERPL-MCNC: 0.2 MG/DL (ref 0–1.2)
BUN SERPL-MCNC: 10 MG/DL (ref 6–20)
BUN/CREAT SERPL: 10.8 (ref 7–25)
CALCIUM SPEC-SCNC: 9.6 MG/DL (ref 8.6–10.5)
CHLORIDE SERPL-SCNC: 96 MMOL/L (ref 98–107)
CO2 SERPL-SCNC: 28 MMOL/L (ref 22–29)
CREAT SERPL-MCNC: 0.93 MG/DL (ref 0.57–1)
DEPRECATED RDW RBC AUTO: 44.6 FL (ref 37–54)
EGFRCR SERPLBLD CKD-EPI 2021: 76.9 ML/MIN/1.73
EOSINOPHIL # BLD AUTO: 0.1 10*3/MM3 (ref 0–0.4)
EOSINOPHIL NFR BLD AUTO: 1 % (ref 0.3–6.2)
ERYTHROCYTE [DISTWIDTH] IN BLOOD BY AUTOMATED COUNT: 13.8 % (ref 12.3–15.4)
GLOBULIN UR ELPH-MCNC: 3.1 GM/DL
GLUCOSE SERPL-MCNC: 103 MG/DL (ref 65–99)
HCT VFR BLD AUTO: 38.9 % (ref 34–46.6)
HGB BLD-MCNC: 12.4 G/DL (ref 12–15.9)
LYMPHOCYTES # BLD AUTO: 1.6 10*3/MM3 (ref 0.7–3.1)
LYMPHOCYTES NFR BLD AUTO: 18.9 % (ref 19.6–45.3)
MCH RBC QN AUTO: 29.5 PG (ref 26.6–33)
MCHC RBC AUTO-ENTMCNC: 31.9 G/DL (ref 31.5–35.7)
MCV RBC AUTO: 92.4 FL (ref 79–97)
MONOCYTES # BLD AUTO: 0.6 10*3/MM3 (ref 0.1–0.9)
MONOCYTES NFR BLD AUTO: 6.7 % (ref 5–12)
NEUTROPHILS NFR BLD AUTO: 6.2 10*3/MM3 (ref 1.7–7)
NEUTROPHILS NFR BLD AUTO: 72.7 % (ref 42.7–76)
NRBC BLD AUTO-RTO: 0.1 /100 WBC (ref 0–0.2)
NT-PROBNP SERPL-MCNC: 471.5 PG/ML (ref 0–450)
PLATELET # BLD AUTO: 272 10*3/MM3 (ref 140–450)
PMV BLD AUTO: 8.2 FL (ref 6–12)
POTASSIUM SERPL-SCNC: 4 MMOL/L (ref 3.5–5.2)
PROT SERPL-MCNC: 7.6 G/DL (ref 6–8.5)
RBC # BLD AUTO: 4.21 10*6/MM3 (ref 3.77–5.28)
SODIUM SERPL-SCNC: 134 MMOL/L (ref 136–145)
WBC NRBC COR # BLD: 8.5 10*3/MM3 (ref 3.4–10.8)

## 2023-05-06 PROCEDURE — 99283 EMERGENCY DEPT VISIT LOW MDM: CPT

## 2023-05-06 PROCEDURE — 80053 COMPREHEN METABOLIC PANEL: CPT | Performed by: PHYSICIAN ASSISTANT

## 2023-05-06 PROCEDURE — 85025 COMPLETE CBC W/AUTO DIFF WBC: CPT | Performed by: PHYSICIAN ASSISTANT

## 2023-05-06 PROCEDURE — 83880 ASSAY OF NATRIURETIC PEPTIDE: CPT | Performed by: PHYSICIAN ASSISTANT

## 2023-05-06 RX ORDER — SODIUM CHLORIDE 0.9 % (FLUSH) 0.9 %
10 SYRINGE (ML) INJECTION AS NEEDED
Status: DISCONTINUED | OUTPATIENT
Start: 2023-05-06 | End: 2023-05-07 | Stop reason: HOSPADM

## 2023-05-07 VITALS
HEIGHT: 63 IN | WEIGHT: 269.4 LBS | DIASTOLIC BLOOD PRESSURE: 88 MMHG | OXYGEN SATURATION: 100 % | TEMPERATURE: 98 F | SYSTOLIC BLOOD PRESSURE: 138 MMHG | RESPIRATION RATE: 16 BRPM | HEART RATE: 74 BPM | BODY MASS INDEX: 47.73 KG/M2

## 2023-05-07 NOTE — ED PROVIDER NOTES
Subjective   History of Present Illness  Chief Complaint: Elevated blood pressure    Patient is a 47-year-old  female history of anxiety, mood disorder, hypertension presents the ER with complaints of elevated blood pressure today.  Patient states that her blood pressure has been elevated over the last 3 days.  3 days ago she had an appoint with her cardiologist, Dr. Phill Marroquin, states that her blood pressure was 159/94 in the office.  She reports that he told her to keep an eye on it and to come to the ER if it becomes worse.  She states that her blood pressures fluctuated between 150s/90s to 160s/100s.  Patient reports being asymptomatic.  Denies any chest pain shortness of breath palpitations headache lightheadedness or dizziness.  No numbness tingling.  Patient states her blood pressure prior to coming to the ER was 160/110s.  She reports taking an extra of her Bystolic as well as clonazepam and reports feeling somewhat better.  She again has no symptoms.  Patient reports that the last few weeks have been very stressful for her and at work.  She reports getting very anxious.  She works as a  and states that she has had a lot of stress at the school.  No other new medications.  Patient states that she does see a psychiatrist for her anxiety    PCP: Ivelisse Acevedo    History provided by:  Patient      Review of Systems   Constitutional: Negative for chills and fever.   HENT: Negative for sore throat and trouble swallowing.    Eyes: Negative.    Respiratory: Negative for shortness of breath and wheezing.    Cardiovascular: Positive for leg swelling. Negative for chest pain.   Gastrointestinal: Negative for abdominal pain, diarrhea, nausea and vomiting.   Endocrine: Negative.    Genitourinary: Negative for dysuria.   Musculoskeletal: Negative for myalgias.   Skin: Negative for rash.   Allergic/Immunologic: Negative.    Neurological: Negative for headaches.   Psychiatric/Behavioral:  Negative for behavioral problems. The patient is nervous/anxious.    All other systems reviewed and are negative.      Past Medical History:   Diagnosis Date   • Allergic    • Anxiety    • Arrhythmia    • Arthritis    • Bipolar 1 disorder    • Depression    • GERD (gastroesophageal reflux disease)    • Hyperlipidemia    • Hypertension    • Hypothyroidism    • Obesity    • OCD (obsessive compulsive disorder)    • RASHARD (obstructive sleep apnea)     cpap       Allergies   Allergen Reactions   • Clarithromycin Irritability       Past Surgical History:   Procedure Laterality Date   •  SECTION      x1   • ENDOMETRIAL ABLATION     • SINUS SURGERY         Family History   Problem Relation Age of Onset   • Hypertension Mother    • Atrial fibrillation Mother    • Diabetes Mother    • Arthritis Mother    • Hypertension Father    • Diabetes Father    • Bipolar disorder Father    • Kidney cancer Father    • Anxiety disorder Father    • Anxiety disorder Sister    • Migraines Sister    • Hypertension Brother    • OCD Daughter        Social History     Socioeconomic History   • Marital status:    • Number of children: 1   Tobacco Use   • Smoking status: Never   • Smokeless tobacco: Never   Vaping Use   • Vaping Use: Never used   Substance and Sexual Activity   • Alcohol use: Not Currently     Comment: Social   • Drug use: Never   • Sexual activity: Yes     Partners: Male     Birth control/protection: Condom           Objective   Physical Exam  Vitals and nursing note reviewed.   Constitutional:       Appearance: Normal appearance. She is well-developed. She is obese. She is not ill-appearing or toxic-appearing.   HENT:      Head: Normocephalic and atraumatic.      Mouth/Throat:      Mouth: Mucous membranes are moist.   Eyes:      Pupils: Pupils are equal, round, and reactive to light.   Cardiovascular:      Rate and Rhythm: Normal rate and regular rhythm.      Pulses: Normal pulses.      Heart sounds: Normal heart  "sounds. No murmur heard.  Pulmonary:      Effort: Pulmonary effort is normal. No respiratory distress.      Breath sounds: Normal breath sounds. No wheezing.   Abdominal:      General: Bowel sounds are normal. There is no distension.      Palpations: Abdomen is soft.      Tenderness: There is no abdominal tenderness. There is no right CVA tenderness or left CVA tenderness.   Musculoskeletal:         General: Normal range of motion.      Cervical back: Normal range of motion.   Skin:     General: Skin is warm and dry.      Capillary Refill: Capillary refill takes less than 2 seconds.      Findings: No rash.   Neurological:      General: No focal deficit present.      Mental Status: She is alert and oriented to person, place, and time.   Psychiatric:         Mood and Affect: Mood normal.         Behavior: Behavior normal.         Procedures           ED Course    /81   Pulse 74   Temp 97.9 °F (36.6 °C) (Temporal)   Resp 16   Ht 160 cm (63\")   Wt 122 kg (269 lb 6.4 oz)   LMP 04/22/2023 (Approximate)   SpO2 100%   BMI 47.72 kg/m²   Labs Reviewed   COMPREHENSIVE METABOLIC PANEL - Abnormal; Notable for the following components:       Result Value    Glucose 103 (*)     Sodium 134 (*)     Chloride 96 (*)     All other components within normal limits    Narrative:     GFR Normal >60  Chronic Kidney Disease <60  Kidney Failure <15     BNP (IN-HOUSE) - Abnormal; Notable for the following components:    proBNP 471.5 (*)     All other components within normal limits    Narrative:     Among patients with dyspnea, NT-proBNP is highly sensitive for the detection of acute congestive heart failure. In addition NT-proBNP of <300 pg/ml effectively rules out acute congestive heart failure with 99% negative predictive value.     CBC WITH AUTO DIFFERENTIAL - Abnormal; Notable for the following components:    Lymphocyte % 18.9 (*)     All other components within normal limits   CBC AND DIFFERENTIAL    Narrative:     The " following orders were created for panel order CBC & Differential.  Procedure                               Abnormality         Status                     ---------                               -----------         ------                     CBC Auto Differential[[id number]]        Abnormal            Final result                 Please view results for these tests on the individual orders.     Medications   sodium chloride 0.9 % flush 10 mL (has no administration in time range)     No radiology results for the last day                                         Medical Decision Making  Differential Dx (Includes but not limited to): Hypertension, anxiety, electrolyte abnormality  Medical Records Reviewed: Patient seen by Dr. Stevenson 3 days ago for follow-up  Labs: On my interpretation CBC no leukocytosis, CMP glucose 103.    Imaging: N/A  Telemetry: N/A  Testing considered but not ordered: Chest x-ray, denies any chest pain or shortness of breath  Nature of Complaint: Acute  Admission vs Discharge: Discharge  Discussion: While in the ED IV was placed and labs were obtained appropriate PPE was worn during exam and throughout all encounters with the patient.  Patient had the above evaluation.  Patient afebrile, nontoxic.  In no acute distress.  She denies any chest pain or shortness of breath.  Patient reports elevated blood pressure for the last 3 days.  She states that she took an extra 0.5 of her Bystolic medications and does report improvement in her blood pressure after this.  Patient blood pressure has been 140s/80s in the ER.  Lab work is unremarkable.  Patient reports that she feels better.  She does report significant stress and anxiety at home and at work it could contribute to patient's elevated blood pressure over the last few days.  Recommended patient to keep a blood pressure log at home, take her blood pressure once in the morning, once in the evening.  Follow the regimen outlined by on-call nurse  for elevated blood pressures at home including taking a extra half of a Bystolic pill or her as needed clonazepam.  Also recommended increasing water intake and increasing her exercise.  I see no indication for admission at this time.    Discharge plan and instructions were discussed with the patient who verbalized understanding and is in agreement with the plan, all questions were answered at this time.  Patient is aware of signs symptoms that would require immediate return to the emergency room.  Patient understands importance of following up with primary care provider for further evaluation and worsening concerns as well as blood pressure recheck in the next 4 weeks.    Patient was discharged in improved stable condition with an upright steady gait.    Patient is aware that discharge does not mean that nothing is wrong but indicates no emergencies present and they must continue care with follow-up as given below or physician of her choice.    Anxiety: acute illness or injury  Hypertension, unspecified type: acute illness or injury  Amount and/or Complexity of Data Reviewed  External Data Reviewed: notes.  Labs: ordered. Decision-making details documented in ED Course.      Risk  Prescription drug management.          Final diagnoses:   Hypertension, unspecified type   Anxiety       ED Disposition  ED Disposition     ED Disposition   Discharge    Condition   Stable    Comment   --             Ivelisse Acevedo, APRRY  [address]  [address]  [facility] [place] [address] [phone]    Schedule an appointment as soon as possible for a visit in 2 days  As needed, If symptoms worsen    Kashif Ramírez DO  [address] [facility] [place] [id number] [phone]    Schedule an appointment as soon as possible for a visit in 2 days  As needed, If symptoms worsen         Medication List      No changes were made to your prescriptions during this visit.          Freda Hardy PA  05/07/23 0016

## 2023-05-07 NOTE — DISCHARGE INSTRUCTIONS
Take medication at home as prescribed.    Take your blood pressure once in the morning once in the evening.  Follow the additional blood pressure medication regimen as given to you by the on-call nurse    Increase your water intake, decrease your salt intake, increase your activity    Follow-up with primary care for recheck  Follow-up with your cardiologist    Return to the ER for new or worsening symptoms

## 2023-05-08 ENCOUNTER — TELEPHONE (OUTPATIENT)
Dept: CARDIOLOGY | Facility: CLINIC | Age: 48
End: 2023-05-08
Payer: COMMERCIAL

## 2023-05-08 NOTE — TELEPHONE ENCOUNTER
----- Message from Kashif Ramírez DO sent at 5/7/2023  5:16 PM EDT -----  Regarding: RE: Home BP reading  Only one diastolic looks out of range. I would not recommend any changes  ----- Message -----  From: Natasha Muhammad MA  Sent: 5/5/2023   3:05 PM EDT  To: Kashif Ramírez DO  Subject: Home BP reading                                  Patient states her BP this week is higher than her normal- 135/88; 138/91; 141/85. Did you want to make any changes to her medication?

## 2023-05-09 ENCOUNTER — OFFICE VISIT (OUTPATIENT)
Dept: CARDIOLOGY | Facility: CLINIC | Age: 48
End: 2023-05-09
Payer: COMMERCIAL

## 2023-05-09 VITALS
HEIGHT: 63 IN | DIASTOLIC BLOOD PRESSURE: 80 MMHG | HEART RATE: 85 BPM | WEIGHT: 266 LBS | BODY MASS INDEX: 47.13 KG/M2 | OXYGEN SATURATION: 98 % | SYSTOLIC BLOOD PRESSURE: 138 MMHG

## 2023-05-09 DIAGNOSIS — R60.9 PERIPHERAL EDEMA: ICD-10-CM

## 2023-05-09 DIAGNOSIS — I10 PRIMARY HYPERTENSION: Primary | ICD-10-CM

## 2023-05-09 NOTE — PROGRESS NOTES
Cardiology Office Visit      Encounter Date:  05/09/2023    Patient ID:   Olga Garcia is a 47 y.o. female.    Reason For Followup:  Palpitations    Brief Clinical History:  Dear Ivelisse Goncalves APRN    I had the pleasure of seeing Olga Garcia today. As you are well aware, this is a 47 y.o. female with no established history of ischemic heart disease.  She does have a history of palpitations, hypothyroidism, hypertension, obstructive sleep apnea, anxiety, and hyperlipidemia.  She presents today for follow-up on the above conditions.    Interval History:  She denies any chest pain pressure heaviness or tightness.  She denies any shortness of breath out of character.  She denies any PND orthopnea.  She denies any syncope or near syncope.  Other than some occasional palpitations, she reports feeling well.    After our last visit, she began having some issues with her blood pressure rising.  It is been steadily rising over the past few days and then she reports that it was extremely high.  So much so she went to the emergency department for evaluation.  Her blood pressure prior to coming to the emergency department was 160/1 tens.  She took an extra Bystolic as well as a clonazepam and reported feeling better.  She had been under a lot of stress the preceding week.    During the course of her ER evaluation her blood pressure fell to 142/81.  Her work-up was unremarkable.  She was discharged home.    She did start taking some diuretic.  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  She will also look into getting some compression socks.    As I am sure you are aware, she was having difficulty in the past with palpitations that were the result of multiple PVCs.  She was placed on Bystolic and since has reported a 95% improvement in her symptoms.      Assessment & Plan    Impressions:  Palpitations  Obesity  Obstructive  "sleep apnea  Hypertension  Hypertensive cardiovascular disease  Hyperlipidemia  Hypothyroidism  Anxiety  Edema    Recommendations:  Continuation of her current cardiovascular regimen at the present time.     This includes statin, and antihypertensives  Utilize compression socks  Lasix 20 mg daily  Follow-up in 2 months time.  Can consider cardiac testing if still symptomatic at that time.      Diagnoses and all orders for this visit:    1. Primary hypertension (Primary)  -     Basic Metabolic Panel; Future    2. Peripheral edema          Objective:    Vitals:  Vitals:    05/09/23 1052   BP: 138/80   BP Location: Left arm   Patient Position: Sitting   Cuff Size: Large Adult   Pulse: 85   SpO2: 98%   Weight: 121 kg (266 lb)   Height: 160 cm (63\")     Body mass index is 47.12 kg/m².      Physical Exam:    General: Alert, cooperative, no distress, appears stated age  Head:  Normocephalic, atraumatic, mucous membranes moist  Eyes:  Conjunctiva/corneas clear, EOM's intact     Neck:  Supple,  no bruit    Lungs: Clear to auscultation bilaterally, no wheezes rhonchi rales are noted  Chest wall: No tenderness  Heart::  Regular rate and rhythm, S1 and S2 normal, 1/6 holosystolic murmur.  No rub or gallop  Abdomen: Soft, non-tender, nondistended bowel sounds active.  Obese  Extremities: No cyanosis, clubbing.  2+ edema  Pulses: 2+ and symmetric all extremities  Skin:  No rashes or lesions  Neuro/psych: A&O x3. CN II through XII are grossly intact with appropriate affect      Allergies:  Allergies   Allergen Reactions   • Clarithromycin Irritability       Medication Review:     Current Outpatient Medications:   •  atorvastatin (LIPITOR) 20 MG tablet, TAKE ONE TABLET BY MOUTH DAILY, Disp: 30 tablet, Rfl: 3  •  clomiPRAMINE (ANAFRANIL) 50 MG capsule, 5 po q d, Disp: 150 capsule, Rfl: 6  •  clonazePAM (KlonoPIN) 0.5 MG tablet, TK 1 T PO TID PRA, Disp: , Rfl: 5  •  fluticasone (FLONASE) 50 MCG/ACT nasal spray, USE 2 SPRAYS IN EACH " NOSTRIL DAILY AS DIRECTED BY PROVIDER, Disp: 48 g, Rfl: 1  •  losartan (COZAAR) 100 MG tablet, TAKE ONE TABLET BY MOUTH DAILY, Disp: 30 tablet, Rfl: 2  •  omeprazole (priLOSEC) 20 MG capsule, TAKE ONE CAPSULE BY MOUTH TWICE A DAY, Disp: 60 capsule, Rfl: 3  •  OXcarbazepine (TRILEPTAL) 600 MG tablet, Take 2 tablets by mouth Every Morning., Disp: , Rfl:   •  VRAYLAR 3 MG capsule, TK 1 C PO D, Disp: , Rfl:   •  furosemide (Lasix) 20 MG tablet, Take 1 tablet by mouth Daily. (Patient not taking: Reported on 2023), Disp: 90 tablet, Rfl: 3  •  levothyroxine (SYNTHROID, LEVOTHROID) 75 MCG tablet, TAKE ONE TABLET BY MOUTH DAILY, Disp: 30 tablet, Rfl: 2  •  Multiple Vitamins-Minerals (MULTI COMPLETE PO), Take  by mouth. (Patient not taking: Reported on 2023), Disp: , Rfl:   •  nebivolol (BYSTOLIC) 10 MG tablet, Take 1 tablet by mouth Daily., Disp: 30 tablet, Rfl: 1    Family History:  Family History   Problem Relation Age of Onset   • Hypertension Mother    • Atrial fibrillation Mother    • Diabetes Mother    • Arthritis Mother    • Hypertension Father    • Diabetes Father    • Bipolar disorder Father    • Kidney cancer Father    • Anxiety disorder Father    • Anxiety disorder Sister    • Migraines Sister    • Hypertension Brother    • OCD Daughter        Past Medical History:  Past Medical History:   Diagnosis Date   • Allergic    • Anxiety    • Arrhythmia    • Arthritis    • Bipolar 1 disorder    • Depression    • GERD (gastroesophageal reflux disease)    • Hyperlipidemia    • Hypertension    • Hypothyroidism    • Obesity    • OCD (obsessive compulsive disorder)    • RASHARD (obstructive sleep apnea)     cpap       Past Surgical History:  Past Surgical History:   Procedure Laterality Date   •  SECTION      x1   • ENDOMETRIAL ABLATION     • SINUS SURGERY         Social History:  Social History     Socioeconomic History   • Marital status:    • Number of children: 1   Tobacco Use   • Smoking status: Never    • Smokeless tobacco: Never   Vaping Use   • Vaping Use: Never used   Substance and Sexual Activity   • Alcohol use: Not Currently     Comment: Social   • Drug use: Never   • Sexual activity: Yes     Partners: Male     Birth control/protection: Condom       Review of Systems:  The following systems were reviewed as they relate to the cardiovascular system: Constitutional, Eyes, ENT, Cardiovascular, Respiratory, Gastrointestinal, Integumentary, Neurological, Psychiatric, Hematologic, Endocrine, Musculoskeletal, and Genitourinary. The pertinent cardiovascular findings are reported above with all other cardiovascular points within those systems being negative.    Diagnostic Study Review:     Current Electrocardiogram:  Procedures no new EKG.  EKG dated 3 May 2023 demonstrates sinus rhythm with a ventricular rate of 77 bpm.    Laboratory Data:  Lab Results   Component Value Date    GLUCOSE 94 05/17/2023    BUN 15 05/17/2023    CREATININE 0.86 05/17/2023    EGFRIFNONA 91 02/08/2021    BCR 17.4 05/17/2023    K 3.8 05/17/2023    CO2 28.6 05/17/2023    CALCIUM 9.3 05/17/2023    ALBUMIN 4.5 05/06/2023    AST 22 05/06/2023    ALT 23 05/06/2023     Lab Results   Component Value Date    GLUCOSE 94 05/17/2023    CALCIUM 9.3 05/17/2023     05/17/2023    K 3.8 05/17/2023    CO2 28.6 05/17/2023     05/17/2023    BUN 15 05/17/2023    CREATININE 0.86 05/17/2023    EGFRIFNONA 91 02/08/2021    BCR 17.4 05/17/2023    ANIONGAP 9.4 05/17/2023     Lab Results   Component Value Date    WBC 8.50 05/06/2023    HGB 12.4 05/06/2023    HCT 38.9 05/06/2023    MCV 92.4 05/06/2023     05/06/2023     Lab Results   Component Value Date    CHOL 122 05/11/2022    TRIG 85 05/11/2022    HDL 34 (L) 05/11/2022    LDL 71 05/11/2022     Lab Results   Component Value Date    HGBA1C 5.4 05/11/2022     Lab Results   Component Value Date    INR 1.0 10/18/2017    PROTIME 11.1 10/18/2017       Most Recent Echo:  Results for orders placed during  the hospital encounter of 05/19/20    Adult Transthoracic Echo Complete W/ Cont if Necessary Per Protocol    Interpretation Summary  · Left ventricular wall thickness is consistent with mild concentric hypertrophy.  · Estimated EF = 65%.  · Left ventricular systolic function is normal.  · Left atrial cavity size is mildly dilated.  · Mild mitral valve regurgitation is present  · Mild tricuspid valve regurgitation is present.       Most Recent Stress Test:  Results for orders placed during the hospital encounter of 02/02/21    Treadmill Stress Test    Interpretation Summary  · No ECG evidence of myocardial ischemia.Negative clinical evidence of myocardial ischemia. Findings consistent with a normal ECG stress test.  · Reached only 81% of the maximal yesterday controlled heart rate secondary to physical limitation       Most Recent Cardiac Catheterization:   No results found for this or any previous visit.       NOTE: The following portions of the patient's note were reviewed, confirmed and/or updated this visit as appropriate: History of present illness/Interval history, physical examination, assessment & plan, allergies, current medications, past family history, past medical history, past social history, past surgical history and problem list.

## 2023-05-09 NOTE — LETTER
May 24, 2023     ISAI Valladares  [address]  [place] [address]    Patient: Olga Garcia   YOB: 1975   Date of Visit: 5/9/2023       Dear ISAI Goncalves:    Thank you for referring Olga Garcia to me for evaluation. Below are the relevant portions of my assessment and plan of care.    If you have questions, please do not hesitate to call me. I look forward to following Olga along with you.         Sincerely,        Kashif Ramírez DO        CC: No Recipients    Kashif Ramírez DO  05/24/23 2317  Signed  Cardiology Office Visit      Encounter Date:  05/09/2023    Patient ID:   Olga Garcia is a 47 y.o. female.    Reason For Followup:  Palpitations    Brief Clinical History:  Dear Ivelisse Goncalves APRN    I had the pleasure of seeing Olga Garcia today. As you are well aware, this is a 47 y.o. female with no established history of ischemic heart disease.  She does have a history of palpitations, hypothyroidism, hypertension, obstructive sleep apnea, anxiety, and hyperlipidemia.  She presents today for follow-up on the above conditions.    Interval History:  She denies any chest pain pressure heaviness or tightness.  She denies any shortness of breath out of character.  She denies any PND orthopnea.  She denies any syncope or near syncope.  Other than some occasional palpitations, she reports feeling well.    After our last visit, she began having some issues with her blood pressure rising.  It is been steadily rising over the past few days and then she reports that it was extremely high.  So much so she went to the emergency department for evaluation.  Her blood pressure prior to coming to the emergency department was 160/1 tens.  She took an extra Bystolic as well as a clonazepam and reported feeling better.  She had been under a lot of stress the preceding week.    During the course of her ER evaluation her blood pressure fell to  "142/81.  Her work-up was unremarkable.  She was discharged home.    She did start taking some diuretic.  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  She will also look into getting some compression socks.    As I am sure you are aware, she was having difficulty in the past with palpitations that were the result of multiple PVCs.  She was placed on Bystolic and since has reported a 95% improvement in her symptoms.      Assessment & Plan    Impressions:  Palpitations  Obesity  Obstructive sleep apnea  Hypertension  Hypertensive cardiovascular disease  Hyperlipidemia  Hypothyroidism  Anxiety  Edema    Recommendations:  Continuation of her current cardiovascular regimen at the present time.     This includes statin, and antihypertensives  Utilize compression socks  Lasix 20 mg daily  Follow-up in 2 months time.  Can consider cardiac testing if still symptomatic at that time.      Diagnoses and all orders for this visit:    1. Primary hypertension (Primary)  -     Basic Metabolic Panel; Future    2. Peripheral edema          Objective:    Vitals:  Vitals:    05/09/23 1052   BP: 138/80   BP Location: Left arm   Patient Position: Sitting   Cuff Size: Large Adult   Pulse: 85   SpO2: 98%   Weight: 121 kg (266 lb)   Height: 160 cm (63\")     Body mass index is 47.12 kg/m².      Physical Exam:    General: Alert, cooperative, no distress, appears stated age  Head:  Normocephalic, atraumatic, mucous membranes moist  Eyes:  Conjunctiva/corneas clear, EOM's intact     Neck:  Supple,  no bruit    Lungs: Clear to auscultation bilaterally, no wheezes rhonchi rales are noted  Chest wall: No tenderness  Heart::  Regular rate and rhythm, S1 and S2 normal, 1/6 holosystolic murmur.  No rub or gallop  Abdomen: Soft, non-tender, nondistended bowel sounds active.  Obese  Extremities: No cyanosis, clubbing.  2+ edema  Pulses: 2+ and symmetric all " extremities  Skin:  No rashes or lesions  Neuro/psych: A&O x3. CN II through XII are grossly intact with appropriate affect      Allergies:  Allergies   Allergen Reactions   • Clarithromycin Irritability       Medication Review:     Current Outpatient Medications:   •  atorvastatin (LIPITOR) 20 MG tablet, TAKE ONE TABLET BY MOUTH DAILY, Disp: 30 tablet, Rfl: 3  •  clomiPRAMINE (ANAFRANIL) 50 MG capsule, 5 po q d, Disp: 150 capsule, Rfl: 6  •  clonazePAM (KlonoPIN) 0.5 MG tablet, TK 1 T PO TID PRA, Disp: , Rfl: 5  •  fluticasone (FLONASE) 50 MCG/ACT nasal spray, USE 2 SPRAYS IN EACH NOSTRIL DAILY AS DIRECTED BY PROVIDER, Disp: 48 g, Rfl: 1  •  losartan (COZAAR) 100 MG tablet, TAKE ONE TABLET BY MOUTH DAILY, Disp: 30 tablet, Rfl: 2  •  omeprazole (priLOSEC) 20 MG capsule, TAKE ONE CAPSULE BY MOUTH TWICE A DAY, Disp: 60 capsule, Rfl: 3  •  OXcarbazepine (TRILEPTAL) 600 MG tablet, Take 2 tablets by mouth Every Morning., Disp: , Rfl:   •  VRAYLAR 3 MG capsule, TK 1 C PO D, Disp: , Rfl:   •  furosemide (Lasix) 20 MG tablet, Take 1 tablet by mouth Daily. (Patient not taking: Reported on 5/9/2023), Disp: 90 tablet, Rfl: 3  •  levothyroxine (SYNTHROID, LEVOTHROID) 75 MCG tablet, TAKE ONE TABLET BY MOUTH DAILY, Disp: 30 tablet, Rfl: 2  •  Multiple Vitamins-Minerals (MULTI COMPLETE PO), Take  by mouth. (Patient not taking: Reported on 5/9/2023), Disp: , Rfl:   •  nebivolol (BYSTOLIC) 10 MG tablet, Take 1 tablet by mouth Daily., Disp: 30 tablet, Rfl: 1    Family History:  Family History   Problem Relation Age of Onset   • Hypertension Mother    • Atrial fibrillation Mother    • Diabetes Mother    • Arthritis Mother    • Hypertension Father    • Diabetes Father    • Bipolar disorder Father    • Kidney cancer Father    • Anxiety disorder Father    • Anxiety disorder Sister    • Migraines Sister    • Hypertension Brother    • OCD Daughter        Past Medical History:  Past Medical History:   Diagnosis Date   • Allergic    •  Anxiety    • Arrhythmia    • Arthritis    • Bipolar 1 disorder    • Depression    • GERD (gastroesophageal reflux disease)    • Hyperlipidemia    • Hypertension    • Hypothyroidism    • Obesity    • OCD (obsessive compulsive disorder)    • RASHARD (obstructive sleep apnea)     cpap       Past Surgical History:  Past Surgical History:   Procedure Laterality Date   •  SECTION      x1   • ENDOMETRIAL ABLATION     • SINUS SURGERY         Social History:  Social History     Socioeconomic History   • Marital status:    • Number of children: 1   Tobacco Use   • Smoking status: Never   • Smokeless tobacco: Never   Vaping Use   • Vaping Use: Never used   Substance and Sexual Activity   • Alcohol use: Not Currently     Comment: Social   • Drug use: Never   • Sexual activity: Yes     Partners: Male     Birth control/protection: Condom       Review of Systems:  The following systems were reviewed as they relate to the cardiovascular system: Constitutional, Eyes, ENT, Cardiovascular, Respiratory, Gastrointestinal, Integumentary, Neurological, Psychiatric, Hematologic, Endocrine, Musculoskeletal, and Genitourinary. The pertinent cardiovascular findings are reported above with all other cardiovascular points within those systems being negative.    Diagnostic Study Review:     Current Electrocardiogram:  Procedures no new EKG.  EKG dated 3 May 2023 demonstrates sinus rhythm with a ventricular rate of 77 bpm.    Laboratory Data:  Lab Results   Component Value Date    GLUCOSE 94 2023    BUN 15 2023    CREATININE 0.86 2023    EGFRIFNONA 91 2021    BCR 17.4 2023    K 3.8 2023    CO2 28.6 2023    CALCIUM 9.3 2023    ALBUMIN 4.5 2023    AST 22 2023    ALT 23 2023     Lab Results   Component Value Date    GLUCOSE 94 2023    CALCIUM 9.3 2023     2023    K 3.8 2023    CO2 28.6 2023     2023    BUN 15 2023     CREATININE 0.86 05/17/2023    EGFRIFNONA 91 02/08/2021    BCR 17.4 05/17/2023    ANIONGAP 9.4 05/17/2023     Lab Results   Component Value Date    WBC 8.50 05/06/2023    HGB 12.4 05/06/2023    HCT 38.9 05/06/2023    MCV 92.4 05/06/2023     05/06/2023     Lab Results   Component Value Date    CHOL 122 05/11/2022    TRIG 85 05/11/2022    HDL 34 (L) 05/11/2022    LDL 71 05/11/2022     Lab Results   Component Value Date    HGBA1C 5.4 05/11/2022     Lab Results   Component Value Date    INR 1.0 10/18/2017    PROTIME 11.1 10/18/2017       Most Recent Echo:  Results for orders placed during the hospital encounter of 05/19/20    Adult Transthoracic Echo Complete W/ Cont if Necessary Per Protocol    Interpretation Summary  · Left ventricular wall thickness is consistent with mild concentric hypertrophy.  · Estimated EF = 65%.  · Left ventricular systolic function is normal.  · Left atrial cavity size is mildly dilated.  · Mild mitral valve regurgitation is present  · Mild tricuspid valve regurgitation is present.       Most Recent Stress Test:  Results for orders placed during the hospital encounter of 02/02/21    Treadmill Stress Test    Interpretation Summary  · No ECG evidence of myocardial ischemia.Negative clinical evidence of myocardial ischemia. Findings consistent with a normal ECG stress test.  · Reached only 81% of the maximal yesterday controlled heart rate secondary to physical limitation       Most Recent Cardiac Catheterization:   No results found for this or any previous visit.       NOTE: The following portions of the patient's note were reviewed, confirmed and/or updated this visit as appropriate: History of present illness/Interval history, physical examination, assessment & plan, allergies, current medications, past family history, past medical history, past social history, past surgical history and problem list.

## 2023-05-12 RX ORDER — LEVOTHYROXINE SODIUM 0.07 MG/1
TABLET ORAL
Qty: 30 TABLET | Refills: 2 | Status: SHIPPED | OUTPATIENT
Start: 2023-05-12

## 2023-05-15 ENCOUNTER — TELEPHONE (OUTPATIENT)
Dept: FAMILY MEDICINE CLINIC | Facility: CLINIC | Age: 48
End: 2023-05-15
Payer: COMMERCIAL

## 2023-05-17 ENCOUNTER — LAB (OUTPATIENT)
Dept: FAMILY MEDICINE CLINIC | Facility: CLINIC | Age: 48
End: 2023-05-17
Payer: COMMERCIAL

## 2023-05-17 DIAGNOSIS — I10 PRIMARY HYPERTENSION: ICD-10-CM

## 2023-05-17 PROCEDURE — 36415 COLL VENOUS BLD VENIPUNCTURE: CPT

## 2023-05-17 PROCEDURE — 80048 BASIC METABOLIC PNL TOTAL CA: CPT | Performed by: INTERNAL MEDICINE

## 2023-05-18 LAB
ANION GAP SERPL CALCULATED.3IONS-SCNC: 9.4 MMOL/L (ref 5–15)
BUN SERPL-MCNC: 15 MG/DL (ref 6–20)
BUN/CREAT SERPL: 17.4 (ref 7–25)
CALCIUM SPEC-SCNC: 9.3 MG/DL (ref 8.6–10.5)
CHLORIDE SERPL-SCNC: 101 MMOL/L (ref 98–107)
CO2 SERPL-SCNC: 28.6 MMOL/L (ref 22–29)
CREAT SERPL-MCNC: 0.86 MG/DL (ref 0.57–1)
EGFRCR SERPLBLD CKD-EPI 2021: 84.5 ML/MIN/1.73
GLUCOSE SERPL-MCNC: 94 MG/DL (ref 65–99)
POTASSIUM SERPL-SCNC: 3.8 MMOL/L (ref 3.5–5.2)
SODIUM SERPL-SCNC: 139 MMOL/L (ref 136–145)

## 2023-05-22 RX ORDER — NEBIVOLOL 10 MG/1
10 TABLET ORAL DAILY
Qty: 30 TABLET | Refills: 1 | Status: SHIPPED | OUTPATIENT
Start: 2023-05-22

## 2023-05-22 RX ORDER — NEBIVOLOL 10 MG/1
TABLET ORAL
Qty: 30 TABLET | Refills: 1 | Status: SHIPPED | OUTPATIENT
Start: 2023-05-22 | End: 2023-05-22 | Stop reason: SDUPTHER

## 2023-05-22 NOTE — TELEPHONE ENCOUNTER
Caller: [facility] [id number]  [place], [place] - [address] - [phone] [place] [phone] FX    Relationship:         Requested Prescriptions:   Requested Prescriptions     Pending Prescriptions Disp Refills   • nebivolol (BYSTOLIC) 10 MG tablet 30 tablet 1     Sig: Take 1 tablet by mouth Daily.     Signed Prescriptions Disp Refills   • nebivolol (BYSTOLIC) 10 MG tablet 30 tablet 1     Sig: TAKE ONE TABLET BY MOUTH DAILY     Authorizing Provider: ROBERT QIU     Ordering User: KYLER NGUYEN        Pharmacy where request should be sent: [facility] [id number] - [place], [place] - [address] - [phone] [place] [phone] FX     Last office visit with prescribing clinician: 5/9/2023   Last telemedicine visit with prescribing clinician: 5/3/2023   Next office visit with prescribing clinician: 7/26/2023     Additional details provided by patient:    Does the patient have less than a 3 day supply:  [x] Yes  [] No    Would you like a call back once the refill request has been completed: [] Yes [x] No    If the office needs to give you a call back, can they leave a voicemail: [] Yes [x] No    Tess Gamez Rep   05/22/23 11:57 EDT

## 2023-05-26 ENCOUNTER — APPOINTMENT (OUTPATIENT)
Dept: CT IMAGING | Facility: HOSPITAL | Age: 48
End: 2023-05-26
Payer: COMMERCIAL

## 2023-05-26 ENCOUNTER — HOSPITAL ENCOUNTER (EMERGENCY)
Facility: HOSPITAL | Age: 48
Discharge: HOME | End: 2023-05-26
Attending: EMERGENCY MEDICINE
Payer: COMMERCIAL

## 2023-05-26 ENCOUNTER — NURSE TRIAGE (OUTPATIENT)
Dept: CALL CENTER | Facility: HOSPITAL | Age: 48
End: 2023-05-26
Payer: COMMERCIAL

## 2023-05-26 VITALS
OXYGEN SATURATION: 99 % | TEMPERATURE: 97.8 F | HEART RATE: 89 BPM | WEIGHT: 267 LBS | RESPIRATION RATE: 18 BRPM | DIASTOLIC BLOOD PRESSURE: 95 MMHG | BODY MASS INDEX: 47.31 KG/M2 | HEIGHT: 63 IN | SYSTOLIC BLOOD PRESSURE: 170 MMHG

## 2023-05-26 DIAGNOSIS — S20.212A CONTUSION OF RIB ON LEFT SIDE, INITIAL ENCOUNTER: Primary | ICD-10-CM

## 2023-05-26 LAB
ALBUMIN SERPL-MCNC: 4 G/DL (ref 3.5–5.2)
ALBUMIN/GLOB SERPL: 1.3 G/DL
ALP SERPL-CCNC: 98 U/L (ref 39–117)
ALT SERPL W P-5'-P-CCNC: 28 U/L (ref 1–33)
ANION GAP SERPL CALCULATED.3IONS-SCNC: 9 MMOL/L (ref 5–15)
AST SERPL-CCNC: 25 U/L (ref 1–32)
BASOPHILS # BLD AUTO: 0.02 10*3/MM3 (ref 0–0.2)
BASOPHILS NFR BLD AUTO: 0.3 % (ref 0–1.5)
BILIRUB SERPL-MCNC: 0.2 MG/DL (ref 0–1.2)
BILIRUB UR QL STRIP: NEGATIVE
BUN SERPL-MCNC: 10 MG/DL (ref 6–20)
BUN/CREAT SERPL: 12 (ref 7–25)
CALCIUM SPEC-SCNC: 9.2 MG/DL (ref 8.6–10.5)
CHLORIDE SERPL-SCNC: 99 MMOL/L (ref 98–107)
CLARITY UR: CLEAR
CO2 SERPL-SCNC: 26 MMOL/L (ref 22–29)
COLOR UR: YELLOW
CREAT SERPL-MCNC: 0.83 MG/DL (ref 0.57–1)
DEPRECATED RDW RBC AUTO: 45.5 FL (ref 37–54)
EGFRCR SERPLBLD CKD-EPI 2021: 88.2 ML/MIN/1.73
EOSINOPHIL # BLD AUTO: 0.05 10*3/MM3 (ref 0–0.4)
EOSINOPHIL NFR BLD AUTO: 0.8 % (ref 0.3–6.2)
ERYTHROCYTE [DISTWIDTH] IN BLOOD BY AUTOMATED COUNT: 13.2 % (ref 12.3–15.4)
GLOBULIN UR ELPH-MCNC: 3.1 GM/DL
GLUCOSE SERPL-MCNC: 109 MG/DL (ref 65–99)
GLUCOSE UR STRIP-MCNC: NEGATIVE MG/DL
HCT VFR BLD AUTO: 40.4 % (ref 34–46.6)
HGB BLD-MCNC: 13 G/DL (ref 12–15.9)
HGB UR QL STRIP.AUTO: NEGATIVE
IMM GRANULOCYTES # BLD AUTO: 0.02 10*3/MM3 (ref 0–0.05)
IMM GRANULOCYTES NFR BLD AUTO: 0.3 % (ref 0–0.5)
KETONES UR QL STRIP: ABNORMAL
LEUKOCYTE ESTERASE UR QL STRIP.AUTO: NEGATIVE
LIPASE SERPL-CCNC: 14 U/L (ref 13–60)
LYMPHOCYTES # BLD AUTO: 1.03 10*3/MM3 (ref 0.7–3.1)
LYMPHOCYTES NFR BLD AUTO: 16 % (ref 19.6–45.3)
MCH RBC QN AUTO: 29.3 PG (ref 26.6–33)
MCHC RBC AUTO-ENTMCNC: 32.2 G/DL (ref 31.5–35.7)
MCV RBC AUTO: 91.2 FL (ref 79–97)
MONOCYTES # BLD AUTO: 0.43 10*3/MM3 (ref 0.1–0.9)
MONOCYTES NFR BLD AUTO: 6.7 % (ref 5–12)
NEUTROPHILS NFR BLD AUTO: 4.89 10*3/MM3 (ref 1.7–7)
NEUTROPHILS NFR BLD AUTO: 75.9 % (ref 42.7–76)
NITRITE UR QL STRIP: NEGATIVE
PH UR STRIP.AUTO: 6.5 [PH] (ref 5–8)
PLATELET # BLD AUTO: 256 10*3/MM3 (ref 140–450)
PMV BLD AUTO: 10.1 FL (ref 6–12)
POTASSIUM SERPL-SCNC: 3.9 MMOL/L (ref 3.5–5.2)
PROT SERPL-MCNC: 7.1 G/DL (ref 6–8.5)
PROT UR QL STRIP: NEGATIVE
RBC # BLD AUTO: 4.43 10*6/MM3 (ref 3.77–5.28)
SODIUM SERPL-SCNC: 134 MMOL/L (ref 136–145)
SP GR UR STRIP: 1.02 (ref 1–1.03)
UROBILINOGEN UR QL STRIP: ABNORMAL
WBC NRBC COR # BLD: 6.44 10*3/MM3 (ref 3.4–10.8)

## 2023-05-26 PROCEDURE — 74177 CT ABD & PELVIS W/CONTRAST: CPT

## 2023-05-26 PROCEDURE — 80053 COMPREHEN METABOLIC PANEL: CPT | Performed by: EMERGENCY MEDICINE

## 2023-05-26 PROCEDURE — 99283 EMERGENCY DEPT VISIT LOW MDM: CPT

## 2023-05-26 PROCEDURE — 83690 ASSAY OF LIPASE: CPT | Performed by: EMERGENCY MEDICINE

## 2023-05-26 PROCEDURE — 85025 COMPLETE CBC W/AUTO DIFF WBC: CPT | Performed by: EMERGENCY MEDICINE

## 2023-05-26 PROCEDURE — 81003 URINALYSIS AUTO W/O SCOPE: CPT | Performed by: EMERGENCY MEDICINE

## 2023-05-26 PROCEDURE — 25510000001 IOPAMIDOL PER 1 ML: Performed by: EMERGENCY MEDICINE

## 2023-05-26 RX ORDER — SODIUM CHLORIDE 0.9 % (FLUSH) 0.9 %
10 SYRINGE (ML) INJECTION AS NEEDED
Status: DISCONTINUED | OUTPATIENT
Start: 2023-05-26 | End: 2023-05-26 | Stop reason: HOSPADM

## 2023-05-26 RX ADMIN — IOPAMIDOL 100 ML: 755 INJECTION, SOLUTION INTRAVENOUS at 16:00

## 2023-05-26 NOTE — FSED PROVIDER NOTE
Subjective   History of Present Illness  87-year-old female presents to the emergency room with left-sided upper quadrant abdominal pain patient reports she has pain upon certain movements she reports her dog walks over her and she has pain denies any shortness of breath or chest pain denies any nausea vomit diarrhea denies any headaches or neck stiffness denies any fevers patient was seen at the urgent care and had an x-ray and a urine study that showed some protein patient was concerned about kidney failure so was advised to come to the ER for further eval        Review of Systems   Constitutional: Negative.    HENT: Negative.    Eyes: Negative.    Respiratory: Negative.    Cardiovascular: Negative.    Gastrointestinal: Positive for abdominal pain.   Endocrine: Negative.    Genitourinary: Negative.    Musculoskeletal: Negative.    Skin: Negative.    Allergic/Immunologic: Negative.    Neurological: Negative.    Hematological: Negative.    Psychiatric/Behavioral: Negative.        Past Medical History:   Diagnosis Date   • Allergic    • Anxiety    • Arrhythmia    • Arthritis    • Bipolar 1 disorder    • Depression    • GERD (gastroesophageal reflux disease)    • Hyperlipidemia    • Hypertension    • Hypothyroidism    • Obesity    • OCD (obsessive compulsive disorder)    • RASHARD (obstructive sleep apnea)     cpap       Allergies   Allergen Reactions   • Clarithromycin Irritability       Past Surgical History:   Procedure Laterality Date   •  SECTION      x1   • ENDOMETRIAL ABLATION     • SINUS SURGERY         Family History   Problem Relation Age of Onset   • Hypertension Mother    • Atrial fibrillation Mother    • Diabetes Mother    • Arthritis Mother    • Hypertension Father    • Diabetes Father    • Bipolar disorder Father    • Kidney cancer Father    • Anxiety disorder Father    • Anxiety disorder Sister    • Migraines Sister    • Hypertension Brother    • OCD Daughter        Social History     Socioeconomic  History   • Marital status:    • Number of children: 1   Tobacco Use   • Smoking status: Never     Passive exposure: Never   • Smokeless tobacco: Never   Vaping Use   • Vaping Use: Never used   Substance and Sexual Activity   • Alcohol use: Not Currently     Comment: Social   • Drug use: Never   • Sexual activity: Yes     Partners: Male     Birth control/protection: Condom           Objective   Physical Exam  Vitals and nursing note reviewed.   Constitutional:       Appearance: Normal appearance.   HENT:      Head: Normocephalic and atraumatic.      Right Ear: Tympanic membrane normal.      Left Ear: Tympanic membrane normal.      Nose: Nose normal.      Mouth/Throat:      Mouth: Mucous membranes are moist.      Pharynx: Oropharynx is clear.   Eyes:      Extraocular Movements: Extraocular movements intact.      Conjunctiva/sclera: Conjunctivae normal.      Pupils: Pupils are equal, round, and reactive to light.   Cardiovascular:      Rate and Rhythm: Normal rate and regular rhythm.      Pulses: Normal pulses.      Heart sounds: Normal heart sounds.   Pulmonary:      Effort: Pulmonary effort is normal.      Breath sounds: Normal breath sounds.   Abdominal:      General: Abdomen is flat.      Palpations: Abdomen is soft.   Musculoskeletal:         General: Normal range of motion.      Cervical back: Normal range of motion and neck supple.   Skin:     General: Skin is warm and dry.      Capillary Refill: Capillary refill takes less than 2 seconds.   Neurological:      General: No focal deficit present.      Mental Status: She is alert and oriented to person, place, and time.   Psychiatric:         Mood and Affect: Mood normal.         Behavior: Behavior normal.         Procedures           ED Course                                           Medical Decision Making  Patient is labs unremarkable CT evidence shows no acute abdominal pelvic pathology patient is likely suffering a rib contusion recommended primary  care follow-up and close return precautions will discharge at this time    Amount and/or Complexity of Data Reviewed  Labs: ordered.  Radiology: ordered.      Risk  Prescription drug management.          Final diagnoses:   Contusion of rib on left side, initial encounter       ED Disposition  ED Disposition     ED Disposition   Discharge    Condition   Stable    Comment   --             Ivelisse Acevedo, APRN  [address] [facility] [address]   [address]  [place] [address] [phone]               Medication List      No changes were made to your prescriptions during this visit.

## 2023-05-26 NOTE — TELEPHONE ENCOUNTER
Patient c/o pain in left side at level of rib cage with progressive worsening x days. No known injury. Reviewed protocol with patient. Advised to see PCP. Connected patient with Ivelisse Acevedo's office.    Reason for Disposition  • [1] MODERATE pain (e.g., interferes with normal activities) AND [2] comes and goes (cramps) AND [3] present > 24 hours  (Exception: Pain with Vomiting or Diarrhea - see that Guideline.)    Additional Information  • Negative: SEVERE difficulty breathing (e.g., struggling for each breath, speaks in single words)  • Negative: Shock suspected (e.g., cold/pale/clammy skin, too weak to stand, low BP, rapid pulse)  • Negative: Difficult to awaken or acting confused (e.g., disoriented, slurred speech)  • Negative: Passed out (i.e., lost consciousness, collapsed and was not responding)  • Negative: Visible sweat on face or sweat dripping down face  • Negative: Sounds like a life-threatening emergency to the triager  • Negative: Followed an abdomen (stomach) injury  • Negative: Chest pain  • Negative: [1] Abdominal pain AND [2] pregnant < 20 weeks  • Negative: [1] Abdominal pain AND [2] pregnant 20 or more weeks  • Negative: [1] Abdominal pain AND [2] postpartum (from 0 to 6 weeks after delivery)  • Negative: Abdomen bloating or swelling are main symptoms  • Negative: [1] SEVERE pain (e.g., excruciating) AND [2] present > 1 hour  • Negative: [1] Pain lasts > 10 minutes AND [2] age > 50  • Negative: [1] Pain lasts > 10 minutes AND [2] age > 40 AND [3] associated chest, arm, neck, upper back or jaw pain  • Negative: [1] Pain lasts > 10 minutes AND [2] age > 35 AND [3] at least one cardiac risk factor (e.g., diabetes, high cholesterol, hypertension, obesity, smoker or strong family history of heart disease)  • Negative: [1] Pain lasts > 10 minutes AND [2] history of heart disease (i.e., heart attack, bypass surgery, angina, angioplasty, CHF; not just a heart murmur)  • Negative: [1] Pain lasts > 10  "minutes AND [2] difficulty breathing  • Negative: [1] Vomiting AND [2] contains red blood  (Exception: Few streaks and only occurred once.)  • Negative: [1] Vomiting AND [2] contains black (\"coffee ground\") material  • Negative: Blood in bowel movements  (Exception: Blood on surface of BM with constipation.)  • Negative: Black or tarry bowel movements  (Exception: Chronic-unchanged black-grey BMs AND is taking iron pills or Pepto-Bismol.)  • Negative: [1] Pregnant 20 or more weeks AND [2] new hand or face swelling  • Negative: [1] Vomiting AND [2] contains bile (green color)  • Negative: Patient sounds very sick or weak to the triager  • Negative: [1] MILD-MODERATE pain AND [2] constant AND [3] present > 2 hours  • Negative: [1] MILD-MODERATE pain AND [2] not relieved by antacid medicine  • Negative: [1] Vomiting AND [2] abdomen looks much more swollen than usual  • Negative: White of the eyes have turned yellow (i.e., jaundice)  • Negative: Fever > 103 F (39.4 C)  • Negative: [1] Fever > 101 F (38.3 C) AND [2] age > 60 years  • Negative: [1] Fever > 100.0 F (37.8 C) AND [2] bedridden (e.g., CVA, chronic illness, recovering from surgery)  • Negative: [1] Fever > 100.0 F (37.8 C) AND [2] diabetes mellitus or weak immune system (e.g., HIV positive, cancer chemo, splenectomy, organ transplant, chronic steroids)    Answer Assessment - Initial Assessment Questions  1. LOCATION: \"Where does it hurt?\"       Left rib cage  2. RADIATION: \"Does the pain shoot anywhere else?\" (e.g., chest, back)      No   3. ONSET: \"When did the pain begin?\" (e.g., minutes, hours or days ago)       2 days ago  4. SUDDEN: \"Gradual or sudden onset?\"      Sudden onset  5. PATTERN \"Does the pain come and go, or is it constant?\"     - If it comes and goes: \"How long does it last?\" \"Do you have pain now?\"      (Note: Comes and goes means the pain is intermittent. It goes away completely between bouts.)     - If constant: \"Is it getting better, " "staying the same, or getting worse?\"       (Note: Constant means the pain never goes away completely; most serious pain is constant and gets worse.)       Comes and goes, positional  6. SEVERITY: \"How bad is the pain?\"  (e.g., Scale 1-10; mild, moderate, or severe)     - MILD (1-3): Doesn't interfere with normal activities, abdomen soft and not tender to touch..      - MODERATE (4-7): Interferes with normal activities or awakens from sleep, abdomen tender to touch.      - SEVERE (8-10): Excruciating pain, doubled over, unable to do any normal activities.        Tenderness, soreness  7. RECURRENT SYMPTOM: \"Have you ever had this type of stomach pain before?\" If Yes, ask: \"When was the last time?\" and \"What happened that time?\"       No   8. AGGRAVATING FACTORS: \"Does anything seem to cause this pain?\" (e.g., foods, stress, alcohol)      Positional   9. CARDIAC SYMPTOMS: \"Do you have any of the following symptoms: chest pain, difficulty breathing, sweating, nausea?\"      No   10. OTHER SYMPTOMS: \"Do you have any other symptoms?\" (e.g., back pain, diarrhea, fever, urination pain, vomiting)        No   11. PREGNANCY: \"Is there any chance you are pregnant?\" \"When was your last menstrual period?\"        No    Protocols used: ABDOMINAL PAIN - UPPER-ADULT-AH    "

## 2023-06-05 RX ORDER — OMEPRAZOLE 20 MG/1
CAPSULE, DELAYED RELEASE ORAL
Qty: 60 CAPSULE | Refills: 3 | Status: SHIPPED | OUTPATIENT
Start: 2023-06-05

## 2023-06-06 ENCOUNTER — OFFICE VISIT (OUTPATIENT)
Dept: FAMILY MEDICINE CLINIC | Facility: CLINIC | Age: 48
End: 2023-06-06
Payer: COMMERCIAL

## 2023-06-06 VITALS
RESPIRATION RATE: 15 BRPM | OXYGEN SATURATION: 99 % | HEART RATE: 82 BPM | WEIGHT: 270 LBS | DIASTOLIC BLOOD PRESSURE: 83 MMHG | HEIGHT: 63 IN | SYSTOLIC BLOOD PRESSURE: 137 MMHG | TEMPERATURE: 97.8 F | BODY MASS INDEX: 47.84 KG/M2

## 2023-06-06 DIAGNOSIS — S20.212S: Primary | ICD-10-CM

## 2023-06-06 DIAGNOSIS — Z12.11 ENCOUNTER FOR SCREENING FOR MALIGNANT NEOPLASM OF COLON: ICD-10-CM

## 2023-06-06 NOTE — PROGRESS NOTES
"Chief Complaint  Results (Had questions about test results from ER)  Subjective        Olga Garcia presents to [facility]  History of Present Illness  Pt comes in today for follow up from recent ER visit. Went to [facility] first on 5/26 with c/o pain in LUQ. No known injuries. Was worried about her kidneys. Had xray and all looked ok.   Went to ER same day for further eval and had CT scan that looked ok. Still with pain and tenderness. Worse with certain movements, reaching. Has tried some NSAIDs.   No N/V.  Also with c/o recent bout of bright red blood when having a BM. Noticed it when wiping. Due for colonoscopy.  Hasn't happened since. States at the time she had been having some diarrhea.      Objective     Vital Signs:   /83   Pulse 82   Temp 97.8 °F (36.6 °C)   Resp 15   Ht 160 cm (63\")   Wt 122 kg (270 lb)   SpO2 99%   BMI 47.83 kg/m²       BP Readings from Last 3 Encounters:   06/06/23 137/83   05/26/23 170/95   05/26/23 145/84       Wt Readings from Last 3 Encounters:   06/06/23 122 kg (270 lb)   05/26/23 121 kg (267 lb)   05/26/23 121 kg (267 lb)     Physical Exam  Constitutional:       Appearance: She is well-developed.   Eyes:      Pupils: Pupils are equal, round, and reactive to light.   Cardiovascular:      Rate and Rhythm: Normal rate and regular rhythm.   Pulmonary:      Effort: Pulmonary effort is normal.      Breath sounds: Normal breath sounds.   Neurological:      Mental Status: She is alert and oriented to person, place, and time.      Result Review :                 Assessment and Plan    Diagnoses and all orders for this visit:    1. Contusion of ribs, left, sequela (Primary)    2. Encounter for screening for malignant neoplasm of colon  -     Ambulatory Referral For Screening Colonoscopy    Give it more time. Nsaids prn  Colonoscopy due  Follow up soon for routine physical  During this office visit, we discussed the pertinent aspects of the visit " and treatment recommendations. Pt verbalizes understanding. Follow up was discussed. Patient was given the opportunity to ask questions and discuss other concerns.         Follow Up   Return in about 4 weeks (around 7/4/2023) for Annual physical.  Patient was given instructions and counseling regarding her condition or for health maintenance advice. Please see specific information pulled into the AVS if appropriate.

## 2023-06-14 RX ORDER — ATORVASTATIN CALCIUM 20 MG/1
TABLET, FILM COATED ORAL
Qty: 30 TABLET | Refills: 3 | Status: SHIPPED | OUTPATIENT
Start: 2023-06-14

## 2023-06-23 ENCOUNTER — TELEPHONE (OUTPATIENT)
Dept: FAMILY MEDICINE CLINIC | Facility: CLINIC | Age: 48
End: 2023-06-23

## 2023-06-23 NOTE — TELEPHONE ENCOUNTER
"  Caller: Olga Garcia \"Jazmin Garcia\"    Relationship: Self    Best call back number:     What is the best time to reach you:     Who are you requesting to speak with (clinical staff, provider,  specific staff member):     Do you know the name of the person who called:     What was the call regarding: PATIENT IS TO HAVE A ULTRASOUND OF HER RIGHT KIDNEY AT [facility].  SHE HAS FOUND OUT THAT SHE HAS A FLOATING RIB IS DISLOCATED AND THIS MAY KEEP HER FROM DOING THE ULTRASOUND.  SHE WANTS TO BE CALLED BACK TO DISCUSS WITH DR UREÑA OR STAFF.     Is it okay if the provider responds through [facility]: NO          "

## 2023-06-27 NOTE — TELEPHONE ENCOUNTER
Patient states she is going to try to do the ultra sound if she can not complete the test she wants the MRI done

## 2023-07-20 ENCOUNTER — OFFICE VISIT (OUTPATIENT)
Dept: CARDIOLOGY | Facility: CLINIC | Age: 48
End: 2023-07-20
Payer: COMMERCIAL

## 2023-07-20 ENCOUNTER — LAB (OUTPATIENT)
Dept: FAMILY MEDICINE CLINIC | Facility: CLINIC | Age: 48
End: 2023-07-20
Payer: COMMERCIAL

## 2023-07-20 VITALS
HEIGHT: 63 IN | HEART RATE: 78 BPM | BODY MASS INDEX: 48.37 KG/M2 | OXYGEN SATURATION: 99 % | WEIGHT: 273 LBS | SYSTOLIC BLOOD PRESSURE: 123 MMHG | DIASTOLIC BLOOD PRESSURE: 85 MMHG

## 2023-07-20 DIAGNOSIS — I10 PRIMARY HYPERTENSION: Primary | ICD-10-CM

## 2023-07-20 DIAGNOSIS — R00.2 PALPITATIONS: ICD-10-CM

## 2023-07-20 DIAGNOSIS — E78.2 MIXED HYPERLIPIDEMIA: ICD-10-CM

## 2023-07-20 DIAGNOSIS — Z00.00 PREVENTATIVE HEALTH CARE: ICD-10-CM

## 2023-07-20 LAB
ALBUMIN SERPL-MCNC: 3.7 G/DL (ref 3.5–5.2)
ALBUMIN/GLOB SERPL: 1.3 G/DL
ALP SERPL-CCNC: 92 U/L (ref 39–117)
ALT SERPL W P-5'-P-CCNC: 36 U/L (ref 1–33)
ANION GAP SERPL CALCULATED.3IONS-SCNC: 10.5 MMOL/L (ref 5–15)
AST SERPL-CCNC: 25 U/L (ref 1–32)
BACTERIA UR QL AUTO: ABNORMAL /HPF
BASOPHILS # BLD AUTO: 0.04 10*3/MM3 (ref 0–0.2)
BASOPHILS NFR BLD AUTO: 0.7 % (ref 0–1.5)
BILIRUB SERPL-MCNC: 0.2 MG/DL (ref 0–1.2)
BILIRUB UR QL STRIP: ABNORMAL
BUN SERPL-MCNC: 9 MG/DL (ref 6–20)
BUN/CREAT SERPL: 11.5 (ref 7–25)
CALCIUM SPEC-SCNC: 8.9 MG/DL (ref 8.6–10.5)
CHLORIDE SERPL-SCNC: 101 MMOL/L (ref 98–107)
CHOLEST SERPL-MCNC: 146 MG/DL (ref 0–200)
CLARITY UR: ABNORMAL
CO2 SERPL-SCNC: 27.5 MMOL/L (ref 22–29)
COLOR UR: ABNORMAL
CREAT SERPL-MCNC: 0.78 MG/DL (ref 0.57–1)
DEPRECATED RDW RBC AUTO: 44.3 FL (ref 37–54)
EGFRCR SERPLBLD CKD-EPI 2021: 95 ML/MIN/1.73
EOSINOPHIL # BLD AUTO: 0.11 10*3/MM3 (ref 0–0.4)
EOSINOPHIL NFR BLD AUTO: 1.8 % (ref 0.3–6.2)
ERYTHROCYTE [DISTWIDTH] IN BLOOD BY AUTOMATED COUNT: 13.3 % (ref 12.3–15.4)
GLOBULIN UR ELPH-MCNC: 2.9 GM/DL
GLUCOSE SERPL-MCNC: 105 MG/DL (ref 65–99)
GLUCOSE UR STRIP-MCNC: NEGATIVE MG/DL
HBA1C MFR BLD: 5.7 % (ref 4.8–5.6)
HCT VFR BLD AUTO: 36.5 % (ref 34–46.6)
HDLC SERPL-MCNC: 46 MG/DL (ref 40–60)
HGB BLD-MCNC: 12 G/DL (ref 12–15.9)
HGB UR QL STRIP.AUTO: NEGATIVE
HYALINE CASTS UR QL AUTO: ABNORMAL /LPF
IMM GRANULOCYTES # BLD AUTO: 0.02 10*3/MM3 (ref 0–0.05)
IMM GRANULOCYTES NFR BLD AUTO: 0.3 % (ref 0–0.5)
KETONES UR QL STRIP: ABNORMAL
LDLC SERPL CALC-MCNC: 85 MG/DL (ref 0–100)
LDLC/HDLC SERPL: 1.84 {RATIO}
LEUKOCYTE ESTERASE UR QL STRIP.AUTO: ABNORMAL
LYMPHOCYTES # BLD AUTO: 1.12 10*3/MM3 (ref 0.7–3.1)
LYMPHOCYTES NFR BLD AUTO: 18.5 % (ref 19.6–45.3)
MCH RBC QN AUTO: 30.1 PG (ref 26.6–33)
MCHC RBC AUTO-ENTMCNC: 32.9 G/DL (ref 31.5–35.7)
MCV RBC AUTO: 91.5 FL (ref 79–97)
MONOCYTES # BLD AUTO: 0.44 10*3/MM3 (ref 0.1–0.9)
MONOCYTES NFR BLD AUTO: 7.3 % (ref 5–12)
NEUTROPHILS NFR BLD AUTO: 4.33 10*3/MM3 (ref 1.7–7)
NEUTROPHILS NFR BLD AUTO: 71.4 % (ref 42.7–76)
NITRITE UR QL STRIP: NEGATIVE
NRBC BLD AUTO-RTO: 0 /100 WBC (ref 0–0.2)
PH UR STRIP.AUTO: 6 [PH] (ref 5–8)
PLATELET # BLD AUTO: 226 10*3/MM3 (ref 140–450)
PMV BLD AUTO: 10.3 FL (ref 6–12)
POTASSIUM SERPL-SCNC: 3.4 MMOL/L (ref 3.5–5.2)
PROT SERPL-MCNC: 6.6 G/DL (ref 6–8.5)
PROT UR QL STRIP: ABNORMAL
RBC # BLD AUTO: 3.99 10*6/MM3 (ref 3.77–5.28)
RBC # UR STRIP: ABNORMAL /HPF
REF LAB TEST METHOD: ABNORMAL
SODIUM SERPL-SCNC: 139 MMOL/L (ref 136–145)
SP GR UR STRIP: >=1.03 (ref 1–1.03)
SQUAMOUS #/AREA URNS HPF: ABNORMAL /HPF
TRIGL SERPL-MCNC: 77 MG/DL (ref 0–150)
TSH SERPL DL<=0.05 MIU/L-ACNC: 1.09 UIU/ML (ref 0.27–4.2)
UROBILINOGEN UR QL STRIP: ABNORMAL
VLDLC SERPL-MCNC: 15 MG/DL (ref 5–40)
WBC # UR STRIP: ABNORMAL /HPF
WBC NRBC COR # BLD: 6.06 10*3/MM3 (ref 3.4–10.8)

## 2023-07-20 PROCEDURE — 99213 OFFICE O/P EST LOW 20 MIN: CPT | Performed by: INTERNAL MEDICINE

## 2023-07-20 PROCEDURE — 81001 URINALYSIS AUTO W/SCOPE: CPT | Performed by: NURSE PRACTITIONER

## 2023-07-20 PROCEDURE — 83036 HEMOGLOBIN GLYCOSYLATED A1C: CPT | Performed by: NURSE PRACTITIONER

## 2023-07-20 PROCEDURE — 36415 COLL VENOUS BLD VENIPUNCTURE: CPT

## 2023-07-20 PROCEDURE — 80061 LIPID PANEL: CPT | Performed by: NURSE PRACTITIONER

## 2023-07-20 PROCEDURE — 80050 GENERAL HEALTH PANEL: CPT | Performed by: NURSE PRACTITIONER

## 2023-07-20 RX ORDER — NEBIVOLOL 10 MG/1
10 TABLET ORAL DAILY
Qty: 90 TABLET | Refills: 1 | Status: SHIPPED | OUTPATIENT
Start: 2023-07-20

## 2023-07-20 NOTE — PROGRESS NOTES
Cardiology Office Visit      Encounter Date:  07/20/2023    Patient ID:   Olga Garcia is a 47 y.o. female.    Reason For Followup:  Palpitations    Brief Clinical History:  Dear Ivelisse Goncalves APRN    I had the pleasure of seeing Olga Garcia today. As you are well aware, this is a 47 y.o. female with no established history of ischemic heart disease.  She does have a history of palpitations, hypothyroidism, hypertension, obstructive sleep apnea, anxiety, and hyperlipidemia.  She presents today for follow-up on the above conditions.    Interval History:  She denies any chest pain pressure heaviness or tightness.  She denies any shortness of breath out of character.  She denies any PND orthopnea.  She denies any syncope or near syncope.  Other than some occasional palpitations, she reports feeling well.    Her blood pressure issues have completely resolved and she is normotensive today in the office.  She is tolerating her antihypertensive regimen quite nicely.    She does report that she was found to have a kidney mass.  She is going to have surgery by Dr. Cates to have this removed.    Since she is doing quite well from a cardiac and blood pressure perspective, I have given her the option to follow-up as needed and to continue to follow-up with you.    Assessment & Plan    Impressions:  Palpitations  Obesity  Obstructive sleep apnea  Hypertension  Hypertensive cardiovascular disease  Hyperlipidemia  Hypothyroidism  Anxiety  Edema    Recommendations:  Continuation of her current cardiovascular regimen at the present time.     This includes statin, and antihypertensives  Follow-up as needed.      Diagnoses and all orders for this visit:    1. Primary hypertension (Primary)    2. Mixed hyperlipidemia    3. Palpitations    Other orders  -     nebivolol (BYSTOLIC) 10 MG tablet; Take 1 tablet by mouth Daily.  Dispense: 90 tablet; Refill: 1          Objective:    Vitals:  Vitals:    07/20/23 1328   BP: 123/85  "  BP Location: Left arm   Patient Position: Sitting   Pulse: 78   SpO2: 99%   Weight: 124 kg (273 lb)   Height: 160 cm (63\")     Body mass index is 48.36 kg/m².      Physical Exam:    General: Alert, cooperative, no distress, appears stated age  Head:  Normocephalic, atraumatic, mucous membranes moist  Eyes:  Conjunctiva/corneas clear, EOM's intact     Neck:  Supple,  no bruit    Lungs: Clear to auscultation bilaterally, no wheezes rhonchi rales are noted  Chest wall: No tenderness  Heart::  Regular rate and rhythm, S1 and S2 normal, 1/6 holosystolic murmur.  No rub or gallop  Abdomen: Soft, non-tender, nondistended bowel sounds active.  Obese  Extremities: No cyanosis, clubbing.  2+ edema  Pulses: 2+ and symmetric all extremities  Skin:  No rashes or lesions  Neuro/psych: A&O x3. CN II through XII are grossly intact with appropriate affect      Allergies:  Allergies   Allergen Reactions    Clarithromycin Irritability       Medication Review:     Current Outpatient Medications:     atorvastatin (LIPITOR) 20 MG tablet, TAKE ONE TABLET BY MOUTH DAILY, Disp: 30 tablet, Rfl: 3    clomiPRAMINE (ANAFRANIL) 50 MG capsule, 5 po q d, Disp: 150 capsule, Rfl: 6    clonazePAM (KlonoPIN) 0.5 MG tablet, TK 1 T PO TID PRA, Disp: , Rfl: 5    fluticasone (FLONASE) 50 MCG/ACT nasal spray, USE 2 SPRAYS IN EACH NOSTRIL DAILY AS DIRECTED BY PROVIDER, Disp: 48 g, Rfl: 1    furosemide (Lasix) 20 MG tablet, Take 1 tablet by mouth Daily., Disp: 90 tablet, Rfl: 3    levothyroxine (SYNTHROID, LEVOTHROID) 75 MCG tablet, TAKE ONE TABLET BY MOUTH DAILY, Disp: 30 tablet, Rfl: 2    losartan (COZAAR) 100 MG tablet, TAKE ONE TABLET BY MOUTH DAILY, Disp: 30 tablet, Rfl: 2    nebivolol (BYSTOLIC) 10 MG tablet, Take 1 tablet by mouth Daily., Disp: 90 tablet, Rfl: 1    omeprazole (priLOSEC) 20 MG capsule, TAKE ONE CAPSULE BY MOUTH TWICE A DAY (Patient taking differently: Take  by mouth 2 (Two) Times a Day.), Disp: 60 capsule, Rfl: 3    OXcarbazepine " (TRILEPTAL) 600 MG tablet, Take 2 tablets by mouth Every Morning., Disp: , Rfl:     VRAYLAR 3 MG capsule, TK 1 C PO D, Disp: , Rfl:     potassium chloride (K-DUR,KLOR-CON) 20 MEQ CR tablet, Take 1 tablet by mouth Daily., Disp: 30 tablet, Rfl: 3    Family History:  Family History   Problem Relation Age of Onset    Hypertension Mother     Atrial fibrillation Mother     Diabetes Mother     Arthritis Mother     Hypertension Father     Diabetes Father     Bipolar disorder Father     Kidney cancer Father     Anxiety disorder Father     Mental illness Father         Ocd    Anxiety disorder Sister     Migraines Sister     Hypertension Brother     OCD Daughter        Past Medical History:  Past Medical History:   Diagnosis Date    Allergic     Anxiety     Arrhythmia     Arthritis     Bipolar 1 disorder     Chronic kidney disease     Depression     GERD (gastroesophageal reflux disease)     Hyperlipidemia     Hypertension     Hypothyroidism     Obesity     OCD (obsessive compulsive disorder)     RASHARD (obstructive sleep apnea)     cpap       Past Surgical History:  Past Surgical History:   Procedure Laterality Date    ABLATION OF DYSRHYTHMIC FOCUS       SECTION      x1    ENDOMETRIAL ABLATION      SINUS SURGERY         Social History:  Social History     Socioeconomic History    Marital status:     Number of children: 1   Tobacco Use    Smoking status: Never     Passive exposure: Never    Smokeless tobacco: Never   Vaping Use    Vaping Use: Never used   Substance and Sexual Activity    Alcohol use: Not Currently     Comment: Social    Drug use: Never    Sexual activity: Yes     Partners: Male     Birth control/protection: Condom       Review of Systems:  The following systems were reviewed as they relate to the cardiovascular system: Constitutional, Eyes, ENT, Cardiovascular, Respiratory, Gastrointestinal, Integumentary, Neurological, Psychiatric, Hematologic, Endocrine, Musculoskeletal, and Genitourinary. The  pertinent cardiovascular findings are reported above with all other cardiovascular points within those systems being negative.    Diagnostic Study Review:     Current Electrocardiogram:  Procedures no new EKG.  EKG dated 3 May 2023 demonstrates sinus rhythm with a ventricular rate of 77 bpm.    Laboratory Data:  Lab Results   Component Value Date    GLUCOSE 105 (H) 07/20/2023    BUN 9 07/20/2023    CREATININE 0.78 07/20/2023    EGFRIFNONA 91 02/08/2021    BCR 11.5 07/20/2023    K 3.4 (L) 07/20/2023    CO2 27.5 07/20/2023    CALCIUM 8.9 07/20/2023    ALBUMIN 3.7 07/20/2023    AST 25 07/20/2023    ALT 36 (H) 07/20/2023     Lab Results   Component Value Date    GLUCOSE 105 (H) 07/20/2023    CALCIUM 8.9 07/20/2023     07/20/2023    K 3.4 (L) 07/20/2023    CO2 27.5 07/20/2023     07/20/2023    BUN 9 07/20/2023    CREATININE 0.78 07/20/2023    EGFRIFNONA 91 02/08/2021    BCR 11.5 07/20/2023    ANIONGAP 10.5 07/20/2023     Lab Results   Component Value Date    WBC 6.06 07/20/2023    HGB 12.0 07/20/2023    HCT 36.5 07/20/2023    MCV 91.5 07/20/2023     07/20/2023     Lab Results   Component Value Date    CHOL 146 07/20/2023    TRIG 77 07/20/2023    HDL 46 07/20/2023    LDL 85 07/20/2023     Lab Results   Component Value Date    HGBA1C 5.70 (H) 07/20/2023     Lab Results   Component Value Date    INR 1.0 10/18/2017    PROTIME 11.1 10/18/2017       Most Recent Echo:  Results for orders placed during the hospital encounter of 05/19/20    Adult Transthoracic Echo Complete W/ Cont if Necessary Per Protocol    Interpretation Summary  · Left ventricular wall thickness is consistent with mild concentric hypertrophy.  · Estimated EF = 65%.  · Left ventricular systolic function is normal.  · Left atrial cavity size is mildly dilated.  · Mild mitral valve regurgitation is present  · Mild tricuspid valve regurgitation is present.       Most Recent Stress Test:  Results for orders placed during the hospital encounter  of 02/02/21    Treadmill Stress Test    Interpretation Summary  · No ECG evidence of myocardial ischemia.Negative clinical evidence of myocardial ischemia. Findings consistent with a normal ECG stress test.  · Reached only 81% of the maximal yesterday controlled heart rate secondary to physical limitation       Most Recent Cardiac Catheterization:   No results found for this or any previous visit.       NOTE: The following portions of the patient's note were reviewed, confirmed and/or updated this visit as appropriate: History of present illness/Interval history, physical examination, assessment & plan, allergies, current medications, past family history, past medical history, past social history, past surgical history and problem list.

## 2023-07-24 ENCOUNTER — PATIENT ROUNDING (BHMG ONLY) (OUTPATIENT)
Dept: FAMILY MEDICINE CLINIC | Facility: CLINIC | Age: 48
End: 2023-07-24
Payer: COMMERCIAL

## 2023-07-24 NOTE — PROGRESS NOTES
July 24, 2023      A [employer] message was sent to Olga Garcia inquiring about patient's experience at our office during a recent visit.      TATYANA HILL  [facility]  [address]  [place] [address].

## 2023-07-25 ENCOUNTER — TELEPHONE (OUTPATIENT)
Dept: FAMILY MEDICINE CLINIC | Facility: CLINIC | Age: 48
End: 2023-07-25
Payer: COMMERCIAL

## 2023-07-25 DIAGNOSIS — E87.6 HYPOKALEMIA: Primary | ICD-10-CM

## 2023-07-25 NOTE — TELEPHONE ENCOUNTER
----- Message from Olga Garcia sent at 7/21/2023 11:05 AM EDT -----  Regarding: Test results  Contact: [phone]  Hello! Received my blood work results today and have some questions. Several things were flagged. What areas of concern are there? Please call me at . Thank you!

## 2023-07-26 RX ORDER — POTASSIUM CHLORIDE 20 MEQ/1
20 TABLET, EXTENDED RELEASE ORAL DAILY
Qty: 30 TABLET | Refills: 3 | Status: SHIPPED | OUTPATIENT
Start: 2023-07-26

## 2023-07-26 NOTE — TELEPHONE ENCOUNTER
Potassium just slightly low at 3.4.  Since she is on lasix 20 mg daily adding in a low dose potassium wouldn't be a bad idea.  I will send that in for her.  It will be one potassium pill every day she takes her lasix.      Her glucose and A1C were slightly elevated.  The A1C is the average blood sugar over the last 3 months.  Normal is less than 5.6 so she is just above normal.  Technically 5.7 is in the prediabetes range so decreasing carbohydrates especially in the form of simple sugars (sweets, sodas, candies, desserts, white breads) along with regular exercise w/ goal of 150 minutes per week will be helpful at bringing that number down.  One of her liver enzymes (ALT) was just barely elevated.  The above diet and exercise changes will improve that as well.

## 2023-07-31 NOTE — PROGRESS NOTES
I think she was already aware of low K+. I believe she already messaged me about potassium supplement.   A1C was 5.7 only borderline prediabetic. Work on diet and exercise. Limiting sweets.

## 2023-08-07 RX ORDER — LEVOTHYROXINE SODIUM 0.07 MG/1
TABLET ORAL
Qty: 30 TABLET | Refills: 2 | Status: SHIPPED | OUTPATIENT
Start: 2023-08-07

## 2023-08-07 RX ORDER — LOSARTAN POTASSIUM 100 MG/1
TABLET ORAL
Qty: 30 TABLET | Refills: 2 | Status: SHIPPED | OUTPATIENT
Start: 2023-08-07

## 2023-08-22 DIAGNOSIS — Z12.31 ENCOUNTER FOR SCREENING MAMMOGRAM FOR MALIGNANT NEOPLASM OF BREAST: ICD-10-CM

## 2023-10-02 RX ORDER — OMEPRAZOLE 20 MG/1
CAPSULE, DELAYED RELEASE ORAL
Qty: 60 CAPSULE | Refills: 3 | Status: SHIPPED | OUTPATIENT
Start: 2023-10-02

## 2023-10-05 ENCOUNTER — TELEPHONE (OUTPATIENT)
Dept: CARDIOLOGY | Facility: CLINIC | Age: 48
End: 2023-10-05
Payer: COMMERCIAL

## 2023-10-05 NOTE — TELEPHONE ENCOUNTER
Spoke with pt her flight is less than 4 hours long she will take whole dose after she reaches her destination.

## 2023-10-05 NOTE — TELEPHONE ENCOUNTER
"Caller: Olga Garcia \"Jazmin Garcia\"     Best call back number: [phone]     What is your medical concern? PT IS GOING ON A PLANE TOMORROW 10.06.2023 AND IS ASKING IF IT WOULD BE OKAY TO HOLD HER LASIX BEFORE THE PLANE RIDE TO KEEP HER FROM GOING TO THE BATHROOM SO MANY TIMES - PT ALSO TAKES POTASSIUM AND ASKING IF SHE SHOULD HOLD THAT AS WELL AS THE LASIX IF THE OKAY IS GIVEN - PT TAKES LASIX DAILY FOR EDEMA   "

## 2023-10-11 RX ORDER — ATORVASTATIN CALCIUM 20 MG/1
20 TABLET, FILM COATED ORAL DAILY
Qty: 30 TABLET | Refills: 3 | Status: SHIPPED | OUTPATIENT
Start: 2023-10-11

## 2023-11-01 RX ORDER — LEVOTHYROXINE SODIUM 0.07 MG/1
75 TABLET ORAL DAILY
Qty: 90 TABLET | Refills: 1 | Status: SHIPPED | OUTPATIENT
Start: 2023-11-01 | End: 2023-11-05

## 2023-11-01 RX ORDER — LOSARTAN POTASSIUM 100 MG/1
100 TABLET ORAL DAILY
Qty: 90 TABLET | Refills: 1 | Status: SHIPPED | OUTPATIENT
Start: 2023-11-01

## 2023-11-05 RX ORDER — LEVOTHYROXINE SODIUM 0.07 MG/1
75 TABLET ORAL DAILY
Qty: 90 TABLET | Refills: 1 | Status: SHIPPED | OUTPATIENT
Start: 2023-11-05

## 2023-11-16 ENCOUNTER — TELEPHONE (OUTPATIENT)
Dept: CARDIOLOGY | Facility: CLINIC | Age: 48
End: 2023-11-16

## 2023-11-16 NOTE — TELEPHONE ENCOUNTER
Caller: BRANDON    Relationship: SELF    Best call back number: [phone]    What is the best time to reach you: ANY      What was the call regarding: PT STATES HER LASIX WAS WORKING FOR A WHILE, BUT SHE IS NOTICING THAT THE SWELLING IS STARTING TO RETURN TO HER ANKLES.  IT'S BEEN ABOUT A WEEK OR TWO AND SHE IS CONCERNED THAT THE MEDICATION MAY NOT BE WORKING AS WELL.

## 2023-11-19 DIAGNOSIS — E87.6 HYPOKALEMIA: ICD-10-CM

## 2023-11-19 RX ORDER — POTASSIUM CHLORIDE 1500 MG/1
20 TABLET, EXTENDED RELEASE ORAL DAILY
Qty: 30 TABLET | Refills: 3 | Status: SHIPPED | OUTPATIENT
Start: 2023-11-19

## 2023-11-30 ENCOUNTER — TELEPHONE (OUTPATIENT)
Dept: CARDIOLOGY | Facility: CLINIC | Age: 48
End: 2023-11-30
Payer: COMMERCIAL

## 2023-12-07 ENCOUNTER — READMISSION MANAGEMENT (OUTPATIENT)
Dept: CALL CENTER | Facility: HOSPITAL | Age: 48
End: 2023-12-07
Payer: COMMERCIAL

## 2023-12-07 NOTE — OUTREACH NOTE
Prep Survey      Flowsheet Row Responses   [facility] patient discharged from? Non-[facility]   Is LACE score < 7 ? Non-BH Discharge   Eligibility [facility]   Date of Admission 12/06/23   Date of Discharge 12/07/23   Discharge Disposition Home or Self Care   Discharge diagnosis DAVINCI PARTIAL NEPHRECTOMY   Does the patient have one of the following disease processes/diagnoses(primary or secondary)? General Surgery   Prep survey completed? Yes            Kia CALVIN - Registered Nurse

## 2023-12-08 ENCOUNTER — TRANSITIONAL CARE MANAGEMENT TELEPHONE ENCOUNTER (OUTPATIENT)
Dept: CALL CENTER | Facility: HOSPITAL | Age: 48
End: 2023-12-08
Payer: COMMERCIAL

## 2023-12-08 NOTE — OUTREACH NOTE
Call Center TCM Note      Flowsheet Row Responses   [facility] patient discharged from? Non-BH   Does the patient have one of the following disease processes/diagnoses(primary or secondary)? General Surgery   TCM attempt successful? Yes   Call start time 1152   Call end time 1155   Discharge diagnosis DAVINCI PARTIAL NEPHRECTOMY   Meds reviewed with patient/caregiver? Yes   Does the patient have all medications ordered at discharge? Yes   Is the patient taking all medications as directed (includes completed medication regime)? Yes   Does the patient have an appointment with their PCP within 7-14 days of discharge? No   Nursing Interventions Patient declined scheduling/rescheduling appointment at this time   Has home health visited the patient within 72 hours of discharge? N/A   Psychosocial issues? No   What is the patient's perception of their health status since discharge? Improving   Is the patient/caregiver able to teach back signs and symptoms related to disease process for when to call PCP? Yes   Is the patient/caregiver able to teach back signs and symptoms related to disease process for when to call 911? Yes   Is the patient/caregiver able to teach back the hierarchy of who to call/visit for symptoms/problems? PCP, Specialist, Home health nurse, Urgent Care, ED, 911 Yes   If the patient is a current smoker, are they able to teach back resources for cessation? Not a smoker   TCM call completed? Yes   Call end time 1155   Would this patient benefit from a Referral to Amb Social Work? No   Is the patient interested in additional calls from an ambulatory ? No            Sydney Weeks LPN    12/8/2023, 12:02 EST

## 2024-01-02 ENCOUNTER — LAB (OUTPATIENT)
Dept: FAMILY MEDICINE CLINIC | Facility: CLINIC | Age: 49
End: 2024-01-02
Payer: COMMERCIAL

## 2024-01-02 ENCOUNTER — OFFICE VISIT (OUTPATIENT)
Dept: FAMILY MEDICINE CLINIC | Facility: CLINIC | Age: 49
End: 2024-01-02
Payer: COMMERCIAL

## 2024-01-02 VITALS
RESPIRATION RATE: 20 BRPM | WEIGHT: 272 LBS | OXYGEN SATURATION: 99 % | HEART RATE: 77 BPM | HEIGHT: 63 IN | BODY MASS INDEX: 48.2 KG/M2 | DIASTOLIC BLOOD PRESSURE: 80 MMHG | SYSTOLIC BLOOD PRESSURE: 126 MMHG

## 2024-01-02 DIAGNOSIS — R73.03 PREDIABETES: ICD-10-CM

## 2024-01-02 DIAGNOSIS — D50.8 OTHER IRON DEFICIENCY ANEMIA: Primary | ICD-10-CM

## 2024-01-02 DIAGNOSIS — D50.8 OTHER IRON DEFICIENCY ANEMIA: ICD-10-CM

## 2024-01-02 DIAGNOSIS — E03.9 HYPOTHYROIDISM, UNSPECIFIED TYPE: ICD-10-CM

## 2024-01-02 LAB
ALBUMIN SERPL-MCNC: 4.2 G/DL (ref 3.5–5.2)
ALBUMIN/GLOB SERPL: 1.6 G/DL
ALP SERPL-CCNC: 111 U/L (ref 39–117)
ALT SERPL W P-5'-P-CCNC: 25 U/L (ref 1–33)
ANION GAP SERPL CALCULATED.3IONS-SCNC: 11 MMOL/L (ref 5–15)
AST SERPL-CCNC: 22 U/L (ref 1–32)
BASOPHILS # BLD AUTO: 0.03 10*3/MM3 (ref 0–0.2)
BASOPHILS NFR BLD AUTO: 0.6 % (ref 0–1.5)
BILIRUB SERPL-MCNC: 0.2 MG/DL (ref 0–1.2)
BUN SERPL-MCNC: 14 MG/DL (ref 6–20)
BUN/CREAT SERPL: 13.3 (ref 7–25)
CALCIUM SPEC-SCNC: 9.3 MG/DL (ref 8.6–10.5)
CHLORIDE SERPL-SCNC: 101 MMOL/L (ref 98–107)
CO2 SERPL-SCNC: 27 MMOL/L (ref 22–29)
CREAT SERPL-MCNC: 1.05 MG/DL (ref 0.57–1)
DEPRECATED RDW RBC AUTO: 43.6 FL (ref 37–54)
EGFRCR SERPLBLD CKD-EPI 2021: 66.1 ML/MIN/1.73
EOSINOPHIL # BLD AUTO: 0.14 10*3/MM3 (ref 0–0.4)
EOSINOPHIL NFR BLD AUTO: 2.8 % (ref 0.3–6.2)
ERYTHROCYTE [DISTWIDTH] IN BLOOD BY AUTOMATED COUNT: 13.1 % (ref 12.3–15.4)
FERRITIN SERPL-MCNC: 33.1 NG/ML (ref 13–150)
GLOBULIN UR ELPH-MCNC: 2.6 GM/DL
GLUCOSE SERPL-MCNC: 101 MG/DL (ref 65–99)
HBA1C MFR BLD: 5.6 % (ref 4.8–5.6)
HCT VFR BLD AUTO: 33.9 % (ref 34–46.6)
HGB BLD-MCNC: 10.9 G/DL (ref 12–15.9)
IMM GRANULOCYTES # BLD AUTO: 0.01 10*3/MM3 (ref 0–0.05)
IMM GRANULOCYTES NFR BLD AUTO: 0.2 % (ref 0–0.5)
IRON 24H UR-MRATE: 76 MCG/DL (ref 37–145)
IRON SATN MFR SERPL: 17 % (ref 20–50)
LYMPHOCYTES # BLD AUTO: 1.28 10*3/MM3 (ref 0.7–3.1)
LYMPHOCYTES NFR BLD AUTO: 25.3 % (ref 19.6–45.3)
MCH RBC QN AUTO: 29.4 PG (ref 26.6–33)
MCHC RBC AUTO-ENTMCNC: 32.2 G/DL (ref 31.5–35.7)
MCV RBC AUTO: 91.4 FL (ref 79–97)
MONOCYTES # BLD AUTO: 0.46 10*3/MM3 (ref 0.1–0.9)
MONOCYTES NFR BLD AUTO: 9.1 % (ref 5–12)
NEUTROPHILS NFR BLD AUTO: 3.13 10*3/MM3 (ref 1.7–7)
NEUTROPHILS NFR BLD AUTO: 62 % (ref 42.7–76)
NRBC BLD AUTO-RTO: 0 /100 WBC (ref 0–0.2)
PLATELET # BLD AUTO: 242 10*3/MM3 (ref 140–450)
PMV BLD AUTO: 10.3 FL (ref 6–12)
POTASSIUM SERPL-SCNC: 3.7 MMOL/L (ref 3.5–5.2)
PROT SERPL-MCNC: 6.8 G/DL (ref 6–8.5)
RBC # BLD AUTO: 3.71 10*6/MM3 (ref 3.77–5.28)
SODIUM SERPL-SCNC: 139 MMOL/L (ref 136–145)
TIBC SERPL-MCNC: 450 MCG/DL (ref 298–536)
TRANSFERRIN SERPL-MCNC: 302 MG/DL (ref 200–360)
TSH SERPL DL<=0.05 MIU/L-ACNC: 1.76 UIU/ML (ref 0.27–4.2)
WBC NRBC COR # BLD AUTO: 5.05 10*3/MM3 (ref 3.4–10.8)

## 2024-01-02 PROCEDURE — 84466 ASSAY OF TRANSFERRIN: CPT | Performed by: NURSE PRACTITIONER

## 2024-01-02 PROCEDURE — 85025 COMPLETE CBC W/AUTO DIFF WBC: CPT | Performed by: NURSE PRACTITIONER

## 2024-01-02 PROCEDURE — 80053 COMPREHEN METABOLIC PANEL: CPT | Performed by: NURSE PRACTITIONER

## 2024-01-02 PROCEDURE — 83036 HEMOGLOBIN GLYCOSYLATED A1C: CPT | Performed by: NURSE PRACTITIONER

## 2024-01-02 PROCEDURE — 99214 OFFICE O/P EST MOD 30 MIN: CPT | Performed by: NURSE PRACTITIONER

## 2024-01-02 PROCEDURE — 83540 ASSAY OF IRON: CPT | Performed by: NURSE PRACTITIONER

## 2024-01-02 PROCEDURE — 36415 COLL VENOUS BLD VENIPUNCTURE: CPT

## 2024-01-02 PROCEDURE — 84443 ASSAY THYROID STIM HORMONE: CPT | Performed by: NURSE PRACTITIONER

## 2024-01-02 PROCEDURE — 82728 ASSAY OF FERRITIN: CPT | Performed by: NURSE PRACTITIONER

## 2024-01-02 RX ORDER — LORATADINE 10 MG/1
10 TABLET ORAL DAILY
COMMUNITY

## 2024-01-02 NOTE — PROGRESS NOTES
"Chief Complaint  Follow-up (6 month)  Subjective        Olga Garcia presents to [facility]  History of Present Illness  Pt comes in today for routine 6 month follow up on Bp.  Since last seen she has had left partial nephrectomy for renal carcinoma.   She is doing well.  It was noted that her Hgb was low at time of surgery.  Surgery was done on 12/6.   She has been having some dizziness last few days.   Hx of RASHARD. Uses CPAP. Havign trouble with driving and falling asleep. Mother drover her here today. Sees sleep medicine MD and looking into narcolepsy. Will discuss poss provigil with him.        Objective     Vital Signs:   /80   Pulse 77   Resp 20   Ht 160 cm (62.99\")   Wt 123 kg (272 lb)   SpO2 99%   BMI 48.19 kg/m²       BP Readings from Last 3 Encounters:   01/02/24 126/80   07/20/23 123/85   07/20/23 120/79       Wt Readings from Last 3 Encounters:   01/02/24 123 kg (272 lb)   07/20/23 124 kg (273 lb)   07/20/23 124 kg (273 lb 6.4 oz)     Physical Exam  Constitutional:       Appearance: She is well-developed. She is obese.   Eyes:      Pupils: Pupils are equal, round, and reactive to light.   Cardiovascular:      Rate and Rhythm: Normal rate and regular rhythm.   Pulmonary:      Effort: Pulmonary effort is normal.      Breath sounds: Normal breath sounds.   Neurological:      Mental Status: She is alert and oriented to person, place, and time.        Result Review :                 Assessment and Plan    Diagnoses and all orders for this visit:    1. Other iron deficiency anemia (Primary)  -     CBC & Differential; Future  -     Ferritin; Future  -     Iron and TIBC; Future    2. Hypothyroidism, unspecified type  -     TSH; Future    3. Prediabetes  -     Comprehensive Metabolic Panel; Future  -     Hemoglobin A1c; Future    Check labs  Colonoscopy pending  Discuss pos provigil with sleep medicine MD  During this office visit, we discussed the pertinent aspects " of the visit and treatment recommendations. Pt verbalizes understanding. Follow up was discussed. Patient was given the opportunity to ask questions and discuss other concerns.         Follow Up   Return in about 6 months (around 7/2/2024) for Annual physical.  Patient was given instructions and counseling regarding her condition or for health maintenance advice. Please see specific information pulled into the AVS if appropriate.       Answers submitted by the patient for this visit:  Other (Submitted on 1/1/2024)  Please describe your symptoms.: 6 mon FU. , Have been feeling dizzy this past week.  Have you had these symptoms before?: No  How long have you been having these symptoms?: 5-7 days  Please describe any probable cause for these symptoms. : Fluid on ear? Just guessing.  Primary Reason for Visit (Submitted on 1/1/2024)  What is the primary reason for your visit?: Other

## 2024-01-03 DIAGNOSIS — D50.8 OTHER IRON DEFICIENCY ANEMIA: Primary | ICD-10-CM

## 2024-01-03 RX ORDER — FERROUS SULFATE 324(65)MG
324 TABLET, DELAYED RELEASE (ENTERIC COATED) ORAL 2 TIMES DAILY WITH MEALS
Qty: 60 TABLET | Refills: 3 | Status: SHIPPED | OUTPATIENT
Start: 2024-01-03

## 2024-01-03 NOTE — PROGRESS NOTES
I want her to start iron supplement twice daily. I will call this in. We will check labs again in about 6 weeks.

## 2024-01-05 ENCOUNTER — TELEPHONE (OUTPATIENT)
Dept: FAMILY MEDICINE CLINIC | Facility: CLINIC | Age: 49
End: 2024-01-05
Payer: COMMERCIAL

## 2024-01-05 NOTE — TELEPHONE ENCOUNTER
"  Caller: Olga Garcia \"Jazmin Garcia\"    Relationship: Self    Best call back number: [phone]    What is the best time to reach you: ANYTIME     Who are you requesting to speak with (clinical staff, provider,  specific staff member): CLINICAL STAFF     Do you know the name of the person who called: SELF     What was the call regarding: PLEASE CALL PATIENT TO GO OVER LAB RESULTS     Is it okay if the provider responds through MyChart: NO     "

## 2024-01-08 NOTE — TELEPHONE ENCOUNTER
HUB TO RELAY:  I want her to start iron supplement twice daily. I will call this in. We will check labs again in about 6 weeks.

## 2024-01-08 NOTE — TELEPHONE ENCOUNTER
"  Name: Olga Garcia \"Jazmin Jose\"    Relationship: Self    Best Callback Number: [phone]    HUB PROVIDED THE RELAY MESSAGE FROM THE OFFICE   PATIENT HAS FURTHER QUESTIONS AND WOULD LIKE A CALL BACK AT THE FOLLOWING PHONE NUMBER [phone]    ADDITIONAL INFORMATION:  WOULD LIKE  A CALLBACK TO GO OVER LAB RESULTS.     HAS QUESTION ABOUT THE SEVERITY OF THE ANEMIA, AND TO KNOW IF THE OTHER TEST CAME BACK IN RANGE.   "

## 2024-01-21 ENCOUNTER — APPOINTMENT (OUTPATIENT)
Dept: GENERAL RADIOLOGY | Facility: HOSPITAL | Age: 49
End: 2024-01-21
Payer: COMMERCIAL

## 2024-01-21 ENCOUNTER — HOSPITAL ENCOUNTER (EMERGENCY)
Facility: HOSPITAL | Age: 49
Discharge: HOME | End: 2024-01-22
Attending: EMERGENCY MEDICINE | Admitting: EMERGENCY MEDICINE
Payer: COMMERCIAL

## 2024-01-21 DIAGNOSIS — E83.42 HYPOMAGNESEMIA: ICD-10-CM

## 2024-01-21 DIAGNOSIS — R00.2 PALPITATIONS: ICD-10-CM

## 2024-01-21 DIAGNOSIS — R07.9 CHEST PAIN, UNSPECIFIED TYPE: Primary | ICD-10-CM

## 2024-01-21 DIAGNOSIS — E87.6 HYPOKALEMIA: ICD-10-CM

## 2024-01-21 LAB
ALBUMIN SERPL-MCNC: 3.2 G/DL (ref 3.5–5.2)
ALBUMIN/GLOB SERPL: 1.4 G/DL
ALP SERPL-CCNC: 69 U/L (ref 39–117)
ALT SERPL W P-5'-P-CCNC: 18 U/L (ref 1–33)
ANION GAP SERPL CALCULATED.3IONS-SCNC: 9 MMOL/L (ref 5–15)
AST SERPL-CCNC: 16 U/L (ref 1–32)
BASOPHILS # BLD AUTO: 0.1 10*3/MM3 (ref 0–0.2)
BASOPHILS NFR BLD AUTO: 0.8 % (ref 0–1.5)
BILIRUB SERPL-MCNC: <0.2 MG/DL (ref 0–1.2)
BUN SERPL-MCNC: 14 MG/DL (ref 6–20)
BUN/CREAT SERPL: 17.5 (ref 7–25)
CALCIUM SPEC-SCNC: 7.1 MG/DL (ref 8.6–10.5)
CHLORIDE SERPL-SCNC: 110 MMOL/L (ref 98–107)
CO2 SERPL-SCNC: 23 MMOL/L (ref 22–29)
CREAT SERPL-MCNC: 0.8 MG/DL (ref 0.57–1)
D DIMER PPP FEU-MCNC: 0.32 MG/L (FEU) (ref 0–0.5)
DEPRECATED RDW RBC AUTO: 49.4 FL (ref 37–54)
EGFRCR SERPLBLD CKD-EPI 2021: 91.6 ML/MIN/1.73
EOSINOPHIL # BLD AUTO: 0.1 10*3/MM3 (ref 0–0.4)
EOSINOPHIL NFR BLD AUTO: 1.8 % (ref 0.3–6.2)
ERYTHROCYTE [DISTWIDTH] IN BLOOD BY AUTOMATED COUNT: 15 % (ref 12.3–15.4)
GLOBULIN UR ELPH-MCNC: 2.3 GM/DL
GLUCOSE SERPL-MCNC: 93 MG/DL (ref 65–99)
HCT VFR BLD AUTO: 36.6 % (ref 34–46.6)
HGB BLD-MCNC: 11.8 G/DL (ref 12–15.9)
LYMPHOCYTES # BLD AUTO: 1.8 10*3/MM3 (ref 0.7–3.1)
LYMPHOCYTES NFR BLD AUTO: 26.7 % (ref 19.6–45.3)
MAGNESIUM SERPL-MCNC: 1.4 MG/DL (ref 1.6–2.6)
MCH RBC QN AUTO: 28.7 PG (ref 26.6–33)
MCHC RBC AUTO-ENTMCNC: 32.1 G/DL (ref 31.5–35.7)
MCV RBC AUTO: 89.4 FL (ref 79–97)
MONOCYTES # BLD AUTO: 0.5 10*3/MM3 (ref 0.1–0.9)
MONOCYTES NFR BLD AUTO: 7.4 % (ref 5–12)
NEUTROPHILS NFR BLD AUTO: 4.3 10*3/MM3 (ref 1.7–7)
NEUTROPHILS NFR BLD AUTO: 63.3 % (ref 42.7–76)
NRBC BLD AUTO-RTO: 0.1 /100 WBC (ref 0–0.2)
NT-PROBNP SERPL-MCNC: 94.5 PG/ML (ref 0–450)
PLATELET # BLD AUTO: 227 10*3/MM3 (ref 140–450)
PMV BLD AUTO: 8.4 FL (ref 6–12)
POTASSIUM SERPL-SCNC: 3.3 MMOL/L (ref 3.5–5.2)
PROT SERPL-MCNC: 5.5 G/DL (ref 6–8.5)
RBC # BLD AUTO: 4.09 10*6/MM3 (ref 3.77–5.28)
SODIUM SERPL-SCNC: 142 MMOL/L (ref 136–145)
TROPONIN T SERPL HS-MCNC: 7 NG/L
TSH SERPL DL<=0.05 MIU/L-ACNC: 3.26 UIU/ML (ref 0.27–4.2)
WBC NRBC COR # BLD AUTO: 6.7 10*3/MM3 (ref 3.4–10.8)

## 2024-01-21 PROCEDURE — 99284 EMERGENCY DEPT VISIT MOD MDM: CPT

## 2024-01-21 PROCEDURE — 84443 ASSAY THYROID STIM HORMONE: CPT | Performed by: NURSE PRACTITIONER

## 2024-01-21 PROCEDURE — 85379 FIBRIN DEGRADATION QUANT: CPT | Performed by: NURSE PRACTITIONER

## 2024-01-21 PROCEDURE — 93005 ELECTROCARDIOGRAM TRACING: CPT

## 2024-01-21 PROCEDURE — 96365 THER/PROPH/DIAG IV INF INIT: CPT

## 2024-01-21 PROCEDURE — 71045 X-RAY EXAM CHEST 1 VIEW: CPT

## 2024-01-21 PROCEDURE — 25010000002 MAGNESIUM SULFATE 2 GM/50ML SOLUTION: Performed by: NURSE PRACTITIONER

## 2024-01-21 PROCEDURE — 83735 ASSAY OF MAGNESIUM: CPT | Performed by: NURSE PRACTITIONER

## 2024-01-21 PROCEDURE — 85025 COMPLETE CBC W/AUTO DIFF WBC: CPT | Performed by: NURSE PRACTITIONER

## 2024-01-21 PROCEDURE — 93005 ELECTROCARDIOGRAM TRACING: CPT | Performed by: EMERGENCY MEDICINE

## 2024-01-21 PROCEDURE — 80053 COMPREHEN METABOLIC PANEL: CPT | Performed by: NURSE PRACTITIONER

## 2024-01-21 PROCEDURE — 84484 ASSAY OF TROPONIN QUANT: CPT | Performed by: NURSE PRACTITIONER

## 2024-01-21 PROCEDURE — 83880 ASSAY OF NATRIURETIC PEPTIDE: CPT | Performed by: NURSE PRACTITIONER

## 2024-01-21 RX ORDER — SODIUM CHLORIDE 0.9 % (FLUSH) 0.9 %
10 SYRINGE (ML) INJECTION AS NEEDED
Status: DISCONTINUED | OUTPATIENT
Start: 2024-01-21 | End: 2024-01-22 | Stop reason: HOSPADM

## 2024-01-21 RX ORDER — POTASSIUM CHLORIDE 20 MEQ/1
20 TABLET, EXTENDED RELEASE ORAL ONCE
Status: COMPLETED | OUTPATIENT
Start: 2024-01-21 | End: 2024-01-21

## 2024-01-21 RX ORDER — MAGNESIUM SULFATE HEPTAHYDRATE 40 MG/ML
2 INJECTION, SOLUTION INTRAVENOUS ONCE
Status: COMPLETED | OUTPATIENT
Start: 2024-01-21 | End: 2024-01-22

## 2024-01-21 RX ADMIN — POTASSIUM CHLORIDE 20 MEQ: 1500 TABLET, EXTENDED RELEASE ORAL at 23:57

## 2024-01-21 RX ADMIN — MAGNESIUM SULFATE HEPTAHYDRATE 2 G: 40 INJECTION, SOLUTION INTRAVENOUS at 23:57

## 2024-01-22 VITALS
WEIGHT: 274.03 LBS | DIASTOLIC BLOOD PRESSURE: 70 MMHG | OXYGEN SATURATION: 99 % | BODY MASS INDEX: 48.55 KG/M2 | SYSTOLIC BLOOD PRESSURE: 150 MMHG | RESPIRATION RATE: 14 BRPM | HEART RATE: 88 BPM | HEIGHT: 63 IN | TEMPERATURE: 98.4 F

## 2024-01-22 LAB
GEN 5 2HR TROPONIN T REFLEX: <6 NG/L
TROPONIN T DELTA: NORMAL

## 2024-01-22 PROCEDURE — 96366 THER/PROPH/DIAG IV INF ADDON: CPT

## 2024-01-22 PROCEDURE — 36415 COLL VENOUS BLD VENIPUNCTURE: CPT

## 2024-01-22 PROCEDURE — 84484 ASSAY OF TROPONIN QUANT: CPT | Performed by: NURSE PRACTITIONER

## 2024-01-22 NOTE — PROGRESS NOTES
Cardiology Office Visit      Encounter Date:  01/30/2024    Patient ID:   Olga Garcia is a 48 y.o. female.    Reason For Followup:  Palpitations    Brief Clinical History:  Dear Ivelisse Goncalves APRN    I had the pleasure of seeing Olga Garcia today. As you are well aware, this is a 48 y.o. female with no established history of ischemic heart disease.  She does have a history of palpitations, hypothyroidism, hypertension, obstructive sleep apnea, anxiety, and hyperlipidemia.  She presents today for follow-up on the above conditions.    Interval History:  She denies any chest pain pressure heaviness or tightness.  She denies any shortness of breath out of character.  She denies any PND orthopnea.  She denies any syncope or near syncope.  Other than some occasional palpitations, she reports feeling well.    Her blood pressure issues have completely resolved and she is normotensive today in the office.  She is tolerating her antihypertensive regimen quite nicely.    She does report that she was found to have a kidney mass.  She is going to have surgery by Dr. Cates to have this removed.    Since she is doing quite well from a cardiac and blood pressure perspective, I have given her the option to follow-up as needed and to continue to follow-up with you.    01/30/2024    She had some chest pressure last week. Felt like a small animal was on her chest. Went to ER and potassium was low. She got potassium and magnesium. Still having the pressure whe she sits on couch when she is doing school stuff. Will last a couple seconds and goes away. This feels distinctly different than her palpitations. Will do 2 week MCOT.    Labs pertinent to this visit (including but not limited to CBC, CMP, and lipid profiles) were requested from the patient's primary care provider/hospital/clinical laboratory.  If the labs were available for the visit, they were reviewed with the patient.  If they were not available, when received,  "special interest will be made to the labs pertinent to this visit.  The patient's most recent \"in-house\" labs are noted below and have been reviewed.  Outside labs pertinent to this visit are scanned into the record and have been reviewed.    Discussions regarding procedures included risk, benefits, and options including but not limited to: Death, MI, stroke, pain, bleeding, infection, and possible need for vascular/thoracic/cardiothoracic surgery.    Copied information within this note was reviewed and is current as of the date of this encounter.    Assessment and plan noted herein and below represents the current plan of care as of the date of this encounter.    Assessment & Plan    Impressions:  Palpitations  Obesity  Obstructive sleep apnea  Hypertension  Hypertensive cardiovascular disease  Hyperlipidemia  Hypothyroidism  Anxiety  Edema    Recommendations:  Continuation of her current cardiovascular regimen at the present time.     This includes statin, and antihypertensives  2-week mobile cardiac telemetry  Follow-up in 4 to 6 weeks      Diagnoses and all orders for this visit:    1. Mixed hyperlipidemia (Primary)  -     Mobile Cardiac Outpatient Telemetry; Future    2. Primary hypertension  -     Mobile Cardiac Outpatient Telemetry; Future    3. Peripheral edema  -     Mobile Cardiac Outpatient Telemetry; Future    4. Palpitations  -     Mobile Cardiac Outpatient Telemetry; Future            Objective:    Vitals:  Vitals:    01/30/24 1444   BP: 122/82   Pulse: 94   SpO2: 100%   Weight: 124 kg (273 lb)   Height: 160 cm (63\")       Body mass index is 48.36 kg/m².      Physical Exam:    General: Alert, cooperative, no distress, appears stated age  Head:  Normocephalic, atraumatic, mucous membranes moist  Eyes:  Conjunctiva/corneas clear, EOM's intact     Neck:  Supple,  no bruit    Lungs: Clear to auscultation bilaterally, no wheezes rhonchi rales are noted  Chest wall: No tenderness  Heart::  Regular rate and " rhythm, S1 and S2 normal, 1/6 holosystolic murmur.  No rub or gallop  Abdomen: Soft, non-tender, nondistended bowel sounds active.  Obese  Extremities: No cyanosis, clubbing.  2+ edema  Pulses: 2+ and symmetric all extremities  Skin:  No rashes or lesions  Neuro/psych: A&O x3. CN II through XII are grossly intact with appropriate affect      Allergies:  Allergies   Allergen Reactions    Clarithromycin Irritability    Yeast Hives     HIVES AND SOME ITCHING. DOES NOT DO YEAST FREE DIET       Medication Review:     Current Outpatient Medications:     atorvastatin (LIPITOR) 20 MG tablet, TAKE 1 TABLET BY MOUTH DAILY, Disp: 30 tablet, Rfl: 3    clomiPRAMINE (ANAFRANIL) 50 MG capsule, 5 po q d, Disp: 150 capsule, Rfl: 6    clonazePAM (KlonoPIN) 0.5 MG tablet, TK 1 T PO TID PRA, Disp: , Rfl: 5    ferrous sulfate 324 MG tablet delayed-release, Take 1 tablet by mouth Every Other Day., Disp: 60 tablet, Rfl: 3    fluticasone (FLONASE) 50 MCG/ACT nasal spray, USE 2 SPRAYS IN EACH NOSTRIL DAILY AS DIRECTED BY PROVIDER, Disp: 48 g, Rfl: 1    furosemide (Lasix) 20 MG tablet, Take 1 tablet by mouth Daily., Disp: 90 tablet, Rfl: 3    KLOR-CON 20 MEQ CR tablet, TAKE 1 TABLET BY MOUTH DAILY, Disp: 30 tablet, Rfl: 3    levothyroxine (SYNTHROID, LEVOTHROID) 75 MCG tablet, TAKE 1 TABLET BY MOUTH DAILY, Disp: 90 tablet, Rfl: 1    loratadine (CLARITIN) 10 MG tablet, Take 1 tablet by mouth Daily., Disp: , Rfl:     losartan (COZAAR) 100 MG tablet, TAKE 1 TABLET BY MOUTH DAILY, Disp: 90 tablet, Rfl: 1    nebivolol (BYSTOLIC) 10 MG tablet, Take 1 tablet by mouth Daily., Disp: 90 tablet, Rfl: 1    omeprazole (priLOSEC) 20 MG capsule, TAKE 1 CAPSULE BY MOUTH TWICE A DAY, Disp: 60 capsule, Rfl: 3    OXcarbazepine (TRILEPTAL) 600 MG tablet, Take 2 tablets by mouth Every Morning., Disp: , Rfl:     VRAYLAR 3 MG capsule, TK 1 C PO D, Disp: , Rfl:     Family History:  Family History   Problem Relation Age of Onset    Hypertension Mother     Atrial  fibrillation Mother     Diabetes Mother     Arthritis Mother     Hypertension Father     Diabetes Father     Bipolar disorder Father     Kidney cancer Father     Anxiety disorder Father     Mental illness Father         Ocd    Anxiety disorder Sister     Migraines Sister     Hypertension Brother     OCD Daughter        Past Medical History:  Past Medical History:   Diagnosis Date    Allergic     Anxiety     Arrhythmia     Arthritis     Bipolar 1 disorder     Cancer Oct. 2023    Renal cell carcinoma    Chronic kidney disease     Depression     GERD (gastroesophageal reflux disease)     Hyperlipidemia     Hypertension     Hypothyroidism     Obesity     OCD (obsessive compulsive disorder)     RASHARD (obstructive sleep apnea)     cpap       Past Surgical History:  Past Surgical History:   Procedure Laterality Date     SECTION      x1    NEPHRECTOMY PARTIAL Left     SINUS SURGERY         Social History:  Social History     Socioeconomic History    Marital status:     Number of children: 1   Tobacco Use    Smoking status: Never     Passive exposure: Never    Smokeless tobacco: Never   Vaping Use    Vaping Use: Never used   Substance and Sexual Activity    Alcohol use: Not Currently     Comment: Social    Drug use: Never    Sexual activity: Yes     Partners: Male     Birth control/protection: Condom       Review of Systems:  The following systems were reviewed as they relate to the cardiovascular system: Constitutional, Eyes, ENT, Cardiovascular, Respiratory, Gastrointestinal, Integumentary, Neurological, Psychiatric, Hematologic, Endocrine, Musculoskeletal, and Genitourinary. The pertinent cardiovascular findings are reported above with all other cardiovascular points within those systems being negative.    Diagnostic Study Review:     Current Electrocardiogram:  Procedures no new EKG. EKG dated 2024 demonstrates sinus rhythm with a ventricular rate of 90 bpm.    Laboratory Data:  Lab Results    Component Value Date    GLUCOSE 92 01/25/2024    BUN 19 01/25/2024    CREATININE 1.11 (H) 01/25/2024    EGFRIFNONA 91 02/08/2021    BCR 17.1 01/25/2024    K 4.3 01/25/2024    CO2 26.0 01/25/2024    CALCIUM 9.5 01/25/2024    ALBUMIN 4.3 01/25/2024    AST 18 01/25/2024    ALT 21 01/25/2024     Lab Results   Component Value Date    GLUCOSE 92 01/25/2024    CALCIUM 9.5 01/25/2024     01/25/2024    K 4.3 01/25/2024    CO2 26.0 01/25/2024     01/25/2024    BUN 19 01/25/2024    CREATININE 1.11 (H) 01/25/2024    EGFRIFNONA 91 02/08/2021    BCR 17.1 01/25/2024    ANIONGAP 10.0 01/25/2024     Lab Results   Component Value Date    WBC 6.49 01/25/2024    HGB 11.8 (L) 01/25/2024    HCT 37.4 01/25/2024    MCV 92.1 01/25/2024     01/25/2024     Lab Results   Component Value Date    CHOL 146 07/20/2023    TRIG 77 07/20/2023    HDL 46 07/20/2023    LDL 85 07/20/2023     Lab Results   Component Value Date    HGBA1C 5.60 01/02/2024     Lab Results   Component Value Date    INR 1.0 10/18/2017    PROTIME 11.1 10/18/2017       Most Recent Echo:  Results for orders placed during the hospital encounter of 05/19/20    Adult Transthoracic Echo Complete W/ Cont if Necessary Per Protocol    Interpretation Summary  · Left ventricular wall thickness is consistent with mild concentric hypertrophy.  · Estimated EF = 65%.  · Left ventricular systolic function is normal.  · Left atrial cavity size is mildly dilated.  · Mild mitral valve regurgitation is present  · Mild tricuspid valve regurgitation is present.       Most Recent Stress Test:  Results for orders placed during the hospital encounter of 02/02/21    Treadmill Stress Test    Interpretation Summary  · No ECG evidence of myocardial ischemia.Negative clinical evidence of myocardial ischemia. Findings consistent with a normal ECG stress test.  · Reached only 81% of the maximal yesterday controlled heart rate secondary to physical limitation       Most Recent Cardiac  Catheterization:   No results found for this or any previous visit.       NOTE: The following portions of the patient's note were reviewed, confirmed and/or updated this visit as appropriate: History of present illness/Interval history, physical examination, assessment & plan, allergies, current medications, past family history, past medical history, past social history, past surgical history and problem list.

## 2024-01-22 NOTE — ED PROVIDER NOTES
Subjective   History of Present Illness  Patient is a 48-year-old obese female who comes in with intermittent chest pain lasting about 10 seconds that started about 20 minutes prior to arrival she denies any chest pain at this time.    She is seeing Dr. Jeffers he has a history of ablation for dysrhythmia      Review of Systems   Constitutional:  Negative for chills, fatigue and fever.   HENT:  Negative for congestion, tinnitus and trouble swallowing.    Eyes:  Negative for photophobia, discharge and redness.   Respiratory:  Negative for cough and shortness of breath.    Cardiovascular:  Positive for chest pain. Negative for palpitations.   Gastrointestinal:  Negative for abdominal pain, diarrhea, nausea and vomiting.   Genitourinary:  Negative for dysuria, frequency and urgency.   Musculoskeletal:  Negative for back pain, joint swelling and myalgias.   Skin:  Negative for rash.   Neurological:  Negative for dizziness and headaches.   Psychiatric/Behavioral:  Negative for confusion.    All other systems reviewed and are negative.      Past Medical History:   Diagnosis Date    Allergic     Anxiety     Arrhythmia     Arthritis     Bipolar 1 disorder     Chronic kidney disease     Depression     GERD (gastroesophageal reflux disease)     Hyperlipidemia     Hypertension     Hypothyroidism     Obesity     OCD (obsessive compulsive disorder)     RASHARD (obstructive sleep apnea)     cpap       Allergies   Allergen Reactions    Clarithromycin Irritability       Past Surgical History:   Procedure Laterality Date     SECTION      x1    SINUS SURGERY         Family History   Problem Relation Age of Onset    Hypertension Mother     Atrial fibrillation Mother     Diabetes Mother     Arthritis Mother     Hypertension Father     Diabetes Father     Bipolar disorder Father     Kidney cancer Father     Anxiety disorder Father     Mental illness Father         Ocd    Anxiety disorder Sister     Migraines Sister     Hypertension  Brother     OCD Daughter        Social History     Socioeconomic History    Marital status:     Number of children: 1   Tobacco Use    Smoking status: Never     Passive exposure: Never    Smokeless tobacco: Never   Vaping Use    Vaping Use: Never used   Substance and Sexual Activity    Alcohol use: Not Currently     Comment: Social    Drug use: Never    Sexual activity: Yes     Partners: Male     Birth control/protection: Condom           Objective   Physical Exam  Vitals reviewed.   Constitutional:       Appearance: She is well-developed. She is obese.   HENT:      Head: Normocephalic and atraumatic.   Eyes:      Conjunctiva/sclera: Conjunctivae normal.      Pupils: Pupils are equal, round, and reactive to light.   Cardiovascular:      Rate and Rhythm: Normal rate and regular rhythm.      Pulses:           Carotid pulses are 2+ on the right side and 2+ on the left side.       Radial pulses are 2+ on the right side and 2+ on the left side.        Dorsalis pedis pulses are 2+ on the right side and 2+ on the left side.        Posterior tibial pulses are 2+ on the right side and 2+ on the left side.      Heart sounds: Normal heart sounds.   Pulmonary:      Effort: Pulmonary effort is normal. No respiratory distress.      Breath sounds: Normal breath sounds. No decreased breath sounds or wheezing.   Abdominal:      General: Bowel sounds are normal. There is no distension.      Palpations: Abdomen is soft. There is no mass.      Tenderness: There is no abdominal tenderness. There is no guarding or rebound.   Musculoskeletal:         General: No deformity. Normal range of motion.      Cervical back: Normal range of motion and neck supple.   Skin:     General: Skin is warm and dry.      Capillary Refill: Capillary refill takes less than 2 seconds.   Neurological:      General: No focal deficit present.      Mental Status: She is alert and oriented to person, place, and time.      GCS: GCS eye subscore is 4. GCS  "verbal subscore is 5. GCS motor subscore is 6.      Cranial Nerves: No cranial nerve deficit.      Sensory: No sensory deficit.      Deep Tendon Reflexes: Reflexes normal.   Psychiatric:         Mood and Affect: Mood normal.         Behavior: Behavior normal.         Procedures           ED Course  ED Course as of 01/23/24 0100   Mon Jan 22, 2024   0020 Discussed admission with this patient and she wishes to go home she states that she will follow-up with cardiology on an outpatient basis. [KW]      ED Course User Index  [KW] Kaveh Nini D, APRN      /70   Pulse 88   Temp 98.4 °F (36.9 °C) (Oral)   Resp 14   Ht 160 cm (63\")   Wt 124 kg (274 lb 0.5 oz)   LMP 12/05/2023   SpO2 99%   BMI 48.54 kg/m²   Labs Reviewed   COMPREHENSIVE METABOLIC PANEL - Abnormal; Notable for the following components:       Result Value    Potassium 3.3 (*)     Chloride 110 (*)     Calcium 7.1 (*)     Total Protein 5.5 (*)     Albumin 3.2 (*)     All other components within normal limits    Narrative:     GFR Normal >60  Chronic Kidney Disease <60  Kidney Failure <15     CBC WITH AUTO DIFFERENTIAL - Abnormal; Notable for the following components:    Hemoglobin 11.8 (*)     All other components within normal limits   MAGNESIUM - Abnormal; Notable for the following components:    Magnesium 1.4 (*)     All other components within normal limits   TROPONIN - Normal    Narrative:     High Sensitive Troponin T Reference Range:  <14.0 ng/L- Negative Female for AMI  <22.0 ng/L- Negative Male for AMI  >=14 - Abnormal Female indicating possible myocardial injury.  >=22 - Abnormal Male indicating possible myocardial injury.   Clinicians would have to utilize clinical acumen, EKG, Troponin, and serial changes to determine if it is an Acute Myocardial Infarction or myocardial injury due to an underlying chronic condition.        BNP (IN-HOUSE) - Normal    Narrative:     This assay is used as an aid in the diagnosis of individuals " "suspected of having heart failure. It can be used as an aid in the diagnosis of acute decompensated heart failure (ADHF) in patients presenting with signs and symptoms of ADHF to the emergency department (ED). In addition, NT-proBNP of <300 pg/mL indicates ADHF is not likely.    Age Range Result Interpretation  NT-proBNP Concentration (pg/mL:      <50             Positive            >450                   Gray                 300-450                    Negative             <300    50-75           Positive            >900                  Gray                300-900                  Negative            <300      >75             Positive            >1800                  Gray                300-1800                  Negative            <300   D-DIMER, QUANTITATIVE - Normal    Narrative:     According to the assay 's published package insert, a normal (<0.50 mg/L (FEU)) D-dimer result in conjunction with a non-high clinical probability assessment, excludes deep vein thrombosis (DVT) and pulmonary embolism (PE) with high sensitivity.    D-dimer values increase with age and this can make VTE exclusion of an older population difficult. To address this, the [employer], based on best available evidence and recent guidelines, recommends that clinicians use age-adjusted D-dimer thresholds in patients greater than 50 years of age with: a) a low probability of PE who do not meet all Pulmonary Embolism Rule Out Criteria, or b) in those with intermediate probability of PE.   The formula for an age-adjusted D-dimer cut-off is \"age/100\".  For example, a 60 year old patient would have an age-adjusted cut-off of 0.60 mg/L (FEU) and an 80 year old 0.80 mg/L (FEU).   TSH - Normal   HIGH SENSITIVITIY TROPONIN T 2HR    Narrative:     High Sensitive Troponin T Reference Range:  <14.0 ng/L- Negative Female for AMI  <22.0 ng/L- Negative Male for AMI  >=14 - Abnormal Female indicating possible myocardial " injury.  >=22 - Abnormal Male indicating possible myocardial injury.   Clinicians would have to utilize clinical acumen, EKG, Troponin, and serial changes to determine if it is an Acute Myocardial Infarction or myocardial injury due to an underlying chronic condition.        CBC AND DIFFERENTIAL    Narrative:     The following orders were created for panel order CBC & Differential.  Procedure                               Abnormality         Status                     ---------                               -----------         ------                     CBC Auto Differential[[id number]]        Abnormal            Final result                 Please view results for these tests on the individual orders.     Medications   magnesium sulfate 2g/50 mL (PREMIX) infusion (0 g Intravenous Stopped 1/22/24 0150)   potassium chloride (K-DUR,KLOR-CON) CR tablet 20 mEq (20 mEq Oral Given 1/21/24 2704)     XR Chest 1 View    Result Date: 1/21/2024  Impression: No acute cardiopulmonary abnormality. Electronically Signed: Rojas Pathak MD  1/21/2024 10:54 PM EST  Workstation ID: [id number]                                            Medical Decision Making  Stress test  Stress Procedure    Rest ECG Baseline ECG of normal sinus rhythm noted. Normal baseline ECG noted at rest.  Stress Description A stress test was performed following the [facility] protocol.   Low risk for ischemic heart disease.   Blood pressure demonstrated a normal response to stress. Heart rate demonstrated a normal response to stress. Overall, the patient's exercise capacity was normal.  Stress ECG Stress ECG of normal sinus rhythm noted. Normal ECG with no significant stress induced changes noted.   There was no ST segment deviation noted during stress.   There were no arrhythmias during stress.   There were no significant arrhythmias noted during stress.      Stress ECG was interpretable.  Recovery ECG During recovery, the patient complained of no significant symptoms  following stress.   Sinus rhythm was noted during recovery. Normal ECG with no significant recovery phase changes noted.  Stress Findings No ECG evidence of myocardial ischemia.Negative clinical evidence of myocardial ischemia. Findings consistent with a normal ECG stress test.    Patient is an obese 48-year-old female who comes in with intermittent chest pain that lasted 10 seconds or so and then resolved patient has had no further pain.  Her CBC chemistry troponin were all essentially normal except for she did have a potassium of 3.3 she was given 20 mEq of K. Dur and a magnesium of 1.3 this could be the result of her chest pain which has not returned while she has been in the emergency room.  She did have a stress test with Dr. Jeffers in 2021 which was normal.    She was offered admission but does not wish to be admitted talked about the risks associated with this and the need for her to follow-up with a cardiologist as soon as possible we talked about her protein and her albumin being low we talked about increasing her steps and decreasing her carb intake and following up with Jackeline Acevedo for further evaluation and treatment she verbalized understood discharge instruction    Problems Addressed:  Chest pain, unspecified type: complicated acute illness or injury  Hypokalemia: complicated acute illness or injury  Hypomagnesemia: complicated acute illness or injury  Palpitations: complicated acute illness or injury    Amount and/or Complexity of Data Reviewed  Independent Historian: spouse  External Data Reviewed: labs, radiology, ECG and notes.  Labs: ordered. Decision-making details documented in ED Course.  Radiology: ordered and independent interpretation performed. Decision-making details documented in ED Course.  ECG/medicine tests: ordered and independent interpretation performed. Decision-making details documented in ED Course.    Risk  OTC drugs.  Prescription drug management.  Decision regarding  hospitalization.        Final diagnoses:   Chest pain, unspecified type   Palpitations   Hypomagnesemia   Hypokalemia       ED Disposition  ED Disposition       ED Disposition   Discharge    Condition   --    Comment   --               Ivelisse Acevedo, APRN  [address]  [address]  [place] [address] [phone]    In 3 days  If symptoms worsen, As needed    Kashif Ramírez, DO  [address] [facility] [place] [address] [phone]      call for follow up appt. ASAP         Medication List      No changes were made to your prescriptions during this visit.            Nini Linn, APRN  01/23/24 0104

## 2024-01-22 NOTE — DISCHARGE INSTRUCTIONS
Push clear liquids-     See your cardiologist for recheck as soon as possible    Follow-up with Jackeline Acevedo for further management of your and recheck of your magnesium and potassium    Return if worse

## 2024-01-25 ENCOUNTER — LAB (OUTPATIENT)
Dept: FAMILY MEDICINE CLINIC | Facility: CLINIC | Age: 49
End: 2024-01-25
Payer: COMMERCIAL

## 2024-01-25 ENCOUNTER — OFFICE VISIT (OUTPATIENT)
Dept: FAMILY MEDICINE CLINIC | Facility: CLINIC | Age: 49
End: 2024-01-25
Payer: COMMERCIAL

## 2024-01-25 VITALS
WEIGHT: 274.2 LBS | HEIGHT: 63 IN | SYSTOLIC BLOOD PRESSURE: 128 MMHG | BODY MASS INDEX: 48.58 KG/M2 | HEART RATE: 72 BPM | OXYGEN SATURATION: 97 % | DIASTOLIC BLOOD PRESSURE: 82 MMHG | RESPIRATION RATE: 16 BRPM

## 2024-01-25 DIAGNOSIS — D50.8 OTHER IRON DEFICIENCY ANEMIA: ICD-10-CM

## 2024-01-25 DIAGNOSIS — R07.9 CHEST PAIN, UNSPECIFIED TYPE: Primary | ICD-10-CM

## 2024-01-25 DIAGNOSIS — E87.6 HYPOKALEMIA: ICD-10-CM

## 2024-01-25 DIAGNOSIS — E83.42 HYPOMAGNESEMIA: ICD-10-CM

## 2024-01-25 LAB
BASOPHILS # BLD AUTO: 0.04 10*3/MM3 (ref 0–0.2)
BASOPHILS NFR BLD AUTO: 0.6 % (ref 0–1.5)
DEPRECATED RDW RBC AUTO: 46 FL (ref 37–54)
EOSINOPHIL # BLD AUTO: 0.13 10*3/MM3 (ref 0–0.4)
EOSINOPHIL NFR BLD AUTO: 2 % (ref 0.3–6.2)
ERYTHROCYTE [DISTWIDTH] IN BLOOD BY AUTOMATED COUNT: 13.4 % (ref 12.3–15.4)
HCT VFR BLD AUTO: 37.4 % (ref 34–46.6)
HGB BLD-MCNC: 11.8 G/DL (ref 12–15.9)
IMM GRANULOCYTES # BLD AUTO: 0.02 10*3/MM3 (ref 0–0.05)
IMM GRANULOCYTES NFR BLD AUTO: 0.3 % (ref 0–0.5)
LYMPHOCYTES # BLD AUTO: 1.49 10*3/MM3 (ref 0.7–3.1)
LYMPHOCYTES NFR BLD AUTO: 23 % (ref 19.6–45.3)
MCH RBC QN AUTO: 29.1 PG (ref 26.6–33)
MCHC RBC AUTO-ENTMCNC: 31.6 G/DL (ref 31.5–35.7)
MCV RBC AUTO: 92.1 FL (ref 79–97)
MONOCYTES # BLD AUTO: 0.56 10*3/MM3 (ref 0.1–0.9)
MONOCYTES NFR BLD AUTO: 8.6 % (ref 5–12)
NEUTROPHILS NFR BLD AUTO: 4.25 10*3/MM3 (ref 1.7–7)
NEUTROPHILS NFR BLD AUTO: 65.5 % (ref 42.7–76)
NRBC BLD AUTO-RTO: 0 /100 WBC (ref 0–0.2)
PLATELET # BLD AUTO: 256 10*3/MM3 (ref 140–450)
PMV BLD AUTO: 10.6 FL (ref 6–12)
QT INTERVAL: 407 MS
QTC INTERVAL: 498 MS
RBC # BLD AUTO: 4.06 10*6/MM3 (ref 3.77–5.28)
WBC NRBC COR # BLD AUTO: 6.49 10*3/MM3 (ref 3.4–10.8)

## 2024-01-25 PROCEDURE — 99214 OFFICE O/P EST MOD 30 MIN: CPT | Performed by: INTERNAL MEDICINE

## 2024-01-25 PROCEDURE — 84466 ASSAY OF TRANSFERRIN: CPT | Performed by: NURSE PRACTITIONER

## 2024-01-25 PROCEDURE — 85025 COMPLETE CBC W/AUTO DIFF WBC: CPT | Performed by: NURSE PRACTITIONER

## 2024-01-25 PROCEDURE — 36415 COLL VENOUS BLD VENIPUNCTURE: CPT

## 2024-01-25 PROCEDURE — 83540 ASSAY OF IRON: CPT | Performed by: NURSE PRACTITIONER

## 2024-01-25 PROCEDURE — 80053 COMPREHEN METABOLIC PANEL: CPT | Performed by: NURSE PRACTITIONER

## 2024-01-25 PROCEDURE — 83735 ASSAY OF MAGNESIUM: CPT | Performed by: NURSE PRACTITIONER

## 2024-01-25 RX ORDER — FERROUS SULFATE 324(65)MG
324 TABLET, DELAYED RELEASE (ENTERIC COATED) ORAL EVERY OTHER DAY
Qty: 60 TABLET | Refills: 3 | Status: SHIPPED | OUTPATIENT
Start: 2024-01-25

## 2024-01-25 NOTE — PROGRESS NOTES
Chief Complaint  Hospital Follow Up Visit    HPI:    Olga Garcia presents to [facility]    Patient is a 48-year-old female with a history of hypertension, hyperlipidemia, palpitations, morbid obesity, anxiety, sleep apnea presenting for ED follow-up.      Patient evaluated in ED on 1/21/2024 after initially presenting with intermittent chest pain lasting approximately 10 seconds that started about 20 minutes prior to arrival. Exam, including cardiopulmonary exam, unremarkable.  Vitals notable for blood pressure 150/70.  Labs notable for potassium 3.3, calcium 7.1, magnesium 1.4, hemoglobin 11.8, otherwise unremarkable.  Troponin, BNP, and D-dimer within normal limits.  EKG with normal sinus rhythm.  Chest x-ray without acute abnormality.  Patient was offered admission which she declined. Patient was given magnesium and potassium infusion replacement before discharge. She was recommended to follow-up with cardiology as an outpatient or sooner if new/worsening symptoms.    Patient overall doing well since she was seen in the ED. She did have one episode of central chest pressure that lasted 3-4 seconds before resolving. No recurrent episodes. Denies shortness of breath, orthopnea, PND, and lower extremity edema. Denies palpitations, tachycardia, dizziness, lightheadedness, and syncope. Patient already with scheduled follow up with cardiology on 1/30/2024.     Patient with previously noted anemia, which is overall improving with supplemental iron.  Patient has noticed constipation with twice a day iron dosing.  Patient not on anticoagulation.  Denies any recent bleeding, bruising, hemoptysis, hematuria, hematochezia, or melena.  Patient reportedly due for colonoscopy, although previously instructed to wait until spring/summer due to prior surgery for renal mass.    Review of Systems:  ROS negative unless otherwise noted in HPI above.    Past Medical History:   Diagnosis Date     Allergic     Anxiety     Arrhythmia     Arthritis     Bipolar 1 disorder     Cancer Oct. 2023    Renal cell carcinoma    Chronic kidney disease     Depression     GERD (gastroesophageal reflux disease)     Hyperlipidemia     Hypertension     Hypothyroidism     Obesity     OCD (obsessive compulsive disorder)     RASHARD (obstructive sleep apnea)     cpap         Current Outpatient Medications:     atorvastatin (LIPITOR) 20 MG tablet, TAKE 1 TABLET BY MOUTH DAILY, Disp: 30 tablet, Rfl: 3    clomiPRAMINE (ANAFRANIL) 50 MG capsule, 5 po q d, Disp: 150 capsule, Rfl: 6    clonazePAM (KlonoPIN) 0.5 MG tablet, TK 1 T PO TID PRA, Disp: , Rfl: 5    fluticasone (FLONASE) 50 MCG/ACT nasal spray, USE 2 SPRAYS IN EACH NOSTRIL DAILY AS DIRECTED BY PROVIDER, Disp: 48 g, Rfl: 1    furosemide (Lasix) 20 MG tablet, Take 1 tablet by mouth Daily., Disp: 90 tablet, Rfl: 3    KLOR-CON 20 MEQ CR tablet, TAKE 1 TABLET BY MOUTH DAILY, Disp: 30 tablet, Rfl: 3    levothyroxine (SYNTHROID, LEVOTHROID) 75 MCG tablet, TAKE 1 TABLET BY MOUTH DAILY, Disp: 90 tablet, Rfl: 1    loratadine (CLARITIN) 10 MG tablet, Take 1 tablet by mouth Daily., Disp: , Rfl:     losartan (COZAAR) 100 MG tablet, TAKE 1 TABLET BY MOUTH DAILY, Disp: 90 tablet, Rfl: 1    nebivolol (BYSTOLIC) 10 MG tablet, Take 1 tablet by mouth Daily., Disp: 90 tablet, Rfl: 1    omeprazole (priLOSEC) 20 MG capsule, TAKE 1 CAPSULE BY MOUTH TWICE A DAY, Disp: 60 capsule, Rfl: 3    OXcarbazepine (TRILEPTAL) 600 MG tablet, Take 2 tablets by mouth Every Morning., Disp: , Rfl:     VRAYLAR 3 MG capsule, TK 1 C PO D, Disp: , Rfl:     ferrous sulfate 324 MG tablet delayed-release, Take 1 tablet by mouth 2 (Two) Times a Day With Meals., Disp: 60 tablet, Rfl: 3    Social History     Socioeconomic History    Marital status:     Number of children: 1   Tobacco Use    Smoking status: Never     Passive exposure: Never    Smokeless tobacco: Never   Vaping Use    Vaping Use: Never used   Substance and  "Sexual Activity    Alcohol use: Not Currently     Comment: Social    Drug use: Never    Sexual activity: Yes     Partners: Male     Birth control/protection: Condom        Objective   Vital Signs:  /82   Pulse 72   Resp 16   Ht 160 cm (63\")   Wt 124 kg (274 lb 3.2 oz)   SpO2 97%   BMI 48.57 kg/m²   Estimated body mass index is 48.57 kg/m² as calculated from the following:    Height as of this encounter: 160 cm (63\").    Weight as of this encounter: 124 kg (274 lb 3.2 oz).    Physical Exam:  General: Well-appearing patient, no apparent distress  HEENT: No posterior pharynx erythema, no tonsillar erythema or exudates,  Cardiac: Regular rate and rhythm, normal S1/S2, no murmur, rubs or gallops, no lower extremity edema  Lungs: Clear to auscultation bilaterally, no crackles or wheezes  Abdomen: Soft, non-tender, no guarding or rebound tenderness, no hepatosplenomegaly  Skin: No significant rashes or lesions  MSK: Grossly normal tone and strength  Neuro: Alert and oriented x3, CN II-XII grossly intact  Psych: Appropriate mood and affect    Assessment and Plan:    (R07.9) Chest pain, unspecified type  Assessment: Patient overall doing well after recent ED visit for transient chest pain with negative workup.  Physical exam, including cardiopulmonary exam, unremarkable.  Currently unclear etiology, although recent workup reassuring.  Patient also previously had Holter monitor and treadmill stress test which was unremarkable.  Reasonable to continue close monitoring as symptoms nonspecific in the setting of negative workup.  Patient already with follow-up scheduled with cardiology.  Patient aware of signs and symptoms which should prompt immediate reevaluation.  Plan:  - Continue medications as prescribed  - Follow-up with cardiology as scheduled   - Follow-up sooner if new or worsening symptoms    (D50.8) Other iron deficiency anemia   Assessment: Most recent hemoglobin 11.8 and improving with iron " supplementation.  Decreasing iron frequency due to constipation.  Discussed importance of colonoscopy, especially in the setting of iron deficiency anemia.  Plan:  - Decrease iron to once every other day dosing  - Increase vitamin C to improve iron absorption  - Colonoscopy when cleared after recent renal surgery    (E83.42) Hypomagnesemia -   (E87.6) Hypokalemia   Assessment: Recent magnesium normalized after recent infusion.  Suspect low potassium and calcium also secondary to hypomag.  Discussed importance of healthy diet  Plan:   - CMP, Magnesium  - Daily multivitamin  - Encourage well-balanced diet  - Consider replacement if low in future    Patient was given instructions and counseling regarding her condition or for health maintenance advice. Please see specific information pulled into the AVS if appropriate.       Dr Eric Olivares   Internal Medicine Physician  [facility]  [address], [address]  [place] [address]

## 2024-01-25 NOTE — PATIENT INSTRUCTIONS
Please stop at lab on second floor to have blood drawn    Medications:  - Adjusted iron to one tablet every other day  - Recommend daily multivitamin  - Continue additional medications as prescribed    Consider additional recommendations based on labs    Encourage healthy diet and exercise    Follow up with cardiology as scheduled or sooner if something arises    Follow up with Ivelisse in one month

## 2024-01-26 LAB
ALBUMIN SERPL-MCNC: 4.3 G/DL (ref 3.5–5.2)
ALBUMIN/GLOB SERPL: 1.6 G/DL
ALP SERPL-CCNC: 93 U/L (ref 39–117)
ALT SERPL W P-5'-P-CCNC: 21 U/L (ref 1–33)
ANION GAP SERPL CALCULATED.3IONS-SCNC: 10 MMOL/L (ref 5–15)
AST SERPL-CCNC: 18 U/L (ref 1–32)
BILIRUB SERPL-MCNC: <0.2 MG/DL (ref 0–1.2)
BUN SERPL-MCNC: 19 MG/DL (ref 6–20)
BUN/CREAT SERPL: 17.1 (ref 7–25)
CALCIUM SPEC-SCNC: 9.5 MG/DL (ref 8.6–10.5)
CHLORIDE SERPL-SCNC: 101 MMOL/L (ref 98–107)
CO2 SERPL-SCNC: 26 MMOL/L (ref 22–29)
CREAT SERPL-MCNC: 1.11 MG/DL (ref 0.57–1)
EGFRCR SERPLBLD CKD-EPI 2021: 61.8 ML/MIN/1.73
GLOBULIN UR ELPH-MCNC: 2.7 GM/DL
GLUCOSE SERPL-MCNC: 92 MG/DL (ref 65–99)
IRON 24H UR-MRATE: 45 MCG/DL (ref 37–145)
IRON SATN MFR SERPL: 10 % (ref 20–50)
MAGNESIUM SERPL-MCNC: 2 MG/DL (ref 1.6–2.6)
POTASSIUM SERPL-SCNC: 4.3 MMOL/L (ref 3.5–5.2)
PROT SERPL-MCNC: 7 G/DL (ref 6–8.5)
SODIUM SERPL-SCNC: 137 MMOL/L (ref 136–145)
TIBC SERPL-MCNC: 443 MCG/DL (ref 298–536)
TRANSFERRIN SERPL-MCNC: 297 MG/DL (ref 200–360)

## 2024-01-26 RX ORDER — OMEPRAZOLE 20 MG/1
CAPSULE, DELAYED RELEASE ORAL
Qty: 60 CAPSULE | Refills: 3 | Status: SHIPPED | OUTPATIENT
Start: 2024-01-26

## 2024-01-30 ENCOUNTER — OFFICE VISIT (OUTPATIENT)
Dept: CARDIOLOGY | Facility: CLINIC | Age: 49
End: 2024-01-30
Payer: COMMERCIAL

## 2024-01-30 VITALS
HEIGHT: 63 IN | WEIGHT: 273 LBS | DIASTOLIC BLOOD PRESSURE: 82 MMHG | SYSTOLIC BLOOD PRESSURE: 122 MMHG | OXYGEN SATURATION: 100 % | BODY MASS INDEX: 48.37 KG/M2 | HEART RATE: 94 BPM

## 2024-01-30 DIAGNOSIS — R60.9 PERIPHERAL EDEMA: ICD-10-CM

## 2024-01-30 DIAGNOSIS — E78.2 MIXED HYPERLIPIDEMIA: Primary | ICD-10-CM

## 2024-01-30 DIAGNOSIS — R00.2 PALPITATIONS: ICD-10-CM

## 2024-01-30 DIAGNOSIS — I10 PRIMARY HYPERTENSION: ICD-10-CM

## 2024-01-30 PROCEDURE — 99214 OFFICE O/P EST MOD 30 MIN: CPT | Performed by: INTERNAL MEDICINE

## 2024-02-06 ENCOUNTER — OFFICE VISIT (OUTPATIENT)
Dept: FAMILY MEDICINE CLINIC | Facility: CLINIC | Age: 49
End: 2024-02-06
Payer: COMMERCIAL

## 2024-02-06 VITALS
OXYGEN SATURATION: 99 % | WEIGHT: 270.4 LBS | RESPIRATION RATE: 16 BRPM | SYSTOLIC BLOOD PRESSURE: 124 MMHG | HEIGHT: 63 IN | BODY MASS INDEX: 47.91 KG/M2 | DIASTOLIC BLOOD PRESSURE: 82 MMHG | HEART RATE: 78 BPM

## 2024-02-06 DIAGNOSIS — R42 VERTIGO: Primary | ICD-10-CM

## 2024-02-06 PROCEDURE — 99214 OFFICE O/P EST MOD 30 MIN: CPT | Performed by: NURSE PRACTITIONER

## 2024-02-06 RX ORDER — MECLIZINE HYDROCHLORIDE 25 MG/1
25 TABLET ORAL 3 TIMES DAILY PRN
Qty: 30 TABLET | Refills: 0 | Status: SHIPPED | OUTPATIENT
Start: 2024-02-06 | End: 2024-02-16

## 2024-02-06 NOTE — PROGRESS NOTES
"Chief Complaint  Dizziness  Subjective        Olga Garcia presents to [facility]  History of Present Illness  Pt comes in today with c/o ongoing dizziness. Waxes and wanes. At times spinning sensation. Worried about anemia, however labs have improved.   She is taking FeSo4 every other day now. Has gotten mixed messages on when and how to take it and wanted clarification.   She had a bad episode this weekend of dizziness that  had to help her get around. States it was difficult to walk. No N/V.   No vision changes.   Did recently follow up with cardiology, but didn't mention it.   No cp or SOA.        Objective     Vital Signs:   /82   Pulse 78   Resp 16   Ht 160 cm (63\")   Wt 123 kg (270 lb 6.4 oz)   SpO2 99%   BMI 47.90 kg/m²       BP Readings from Last 3 Encounters:   02/06/24 124/82   01/30/24 122/82   01/25/24 128/82       Wt Readings from Last 3 Encounters:   02/06/24 123 kg (270 lb 6.4 oz)   01/30/24 124 kg (273 lb)   01/25/24 124 kg (274 lb 3.2 oz)     Physical Exam  Constitutional:       Appearance: She is well-developed.   Eyes:      Pupils: Pupils are equal, round, and reactive to light.   Cardiovascular:      Rate and Rhythm: Normal rate and regular rhythm.   Pulmonary:      Effort: Pulmonary effort is normal.      Breath sounds: Normal breath sounds.   Neurological:      Mental Status: She is alert and oriented to person, place, and time.      Comments: +linda hallpike on left         Result Review :                 Assessment and Plan    Diagnoses and all orders for this visit:    1. Vertigo (Primary)  -     meclizine (ANTIVERT) 25 MG tablet; Take 1 tablet by mouth 3 (Three) Times a Day As Needed for Dizziness for up to 10 days.  Dispense: 30 tablet; Refill: 0  -     Ambulatory Referral to Physical Therapy Vestibular    Meclizine prn  Referral for vestibular therapy  Pt is going to follow up with ENT  During this office visit, we discussed the " pertinent aspects of the visit and treatment recommendations. Pt verbalizes understanding. Follow up was discussed. Patient was given the opportunity to ask questions and discuss other concerns.         Follow Up   No follow-ups on file.  Patient was given instructions and counseling regarding her condition or for health maintenance advice. Please see specific information pulled into the AVS if appropriate.       Answers submitted by the patient for this visit:  Other (Submitted on 2/5/2024)  Please describe your symptoms.: Still feeling dizzy. Want to ck iron levels if blood work can be done again.  Have you had these symptoms before?: Yes  How long have you been having these symptoms?: Greater than 2 weeks  Please describe any probable cause for these symptoms. : Low iron?  Primary Reason for Visit (Submitted on 2/5/2024)  What is the primary reason for your visit?: Other

## 2024-02-08 ENCOUNTER — HOSPITAL ENCOUNTER (OUTPATIENT)
Dept: CARDIOLOGY | Facility: HOSPITAL | Age: 49
Discharge: HOME | End: 2024-02-08
Payer: COMMERCIAL

## 2024-02-08 DIAGNOSIS — I10 PRIMARY HYPERTENSION: ICD-10-CM

## 2024-02-08 DIAGNOSIS — E78.2 MIXED HYPERLIPIDEMIA: ICD-10-CM

## 2024-02-08 DIAGNOSIS — R00.2 PALPITATIONS: ICD-10-CM

## 2024-02-08 DIAGNOSIS — R60.9 PERIPHERAL EDEMA: ICD-10-CM

## 2024-02-11 RX ORDER — ATORVASTATIN CALCIUM 20 MG/1
20 TABLET, FILM COATED ORAL DAILY
Qty: 30 TABLET | Refills: 3 | Status: SHIPPED | OUTPATIENT
Start: 2024-02-11

## 2024-02-28 PROBLEM — I47.29 NONSUSTAINED VENTRICULAR TACHYCARDIA: Status: ACTIVE | Noted: 2024-02-28

## 2024-02-28 NOTE — PROGRESS NOTES
Cardiology Office Visit      Encounter Date:  02/29/2024    Patient ID:   Olga Garcia is a 48 y.o. female.    Reason For Followup:  Palpitations    Brief Clinical History:  Dear Ivelisse Goncalves APRN    I had the pleasure of seeing Olga Garcia today. As you are well aware, this is a 48 y.o. female with no established history of ischemic heart disease.  She does have a history of palpitations, hypothyroidism, hypertension, obstructive sleep apnea, anxiety, and hyperlipidemia.  She presents today for follow-up on the above conditions.    Interval History:  She denies any chest pain pressure heaviness or tightness.  She denies any shortness of breath out of character.  She denies any PND orthopnea.  She denies any syncope or near syncope.  Other than some occasional palpitations, she reports feeling well.    Her blood pressure issues have completely resolved and she is normotensive today in the office.  She is tolerating her antihypertensive regimen quite nicely.    She does report that she was found to have a kidney mass.  She is going to have surgery by Dr. Cates to have this removed.    Since she is doing quite well from a cardiac and blood pressure perspective, I have given her the option to follow-up as needed and to continue to follow-up with you.    01/30/2024    She had some chest pressure last week. Felt like a small animal was on her chest. Went to ER and potassium was low. She got potassium and magnesium. Still having the pressure whe she sits on couch when she is doing school stuff. Will last a couple seconds and goes away. This feels distinctly different than her palpitations. Will do 2 week MCOT.    02/29/2024        As a result of her above symptoms she underwent a 2-week mobile cardiac telemetry.  Her study was abnormal with 1 episode of ventricular tachycardia that lasted 5 beats with a peak rate of 146 bpm.  She was symptomatic with this episode.    We do lengthy discussion regarding  ventricular tachycardia and its potential causative etiologies.  We discussed risk benefits and options including cardiac catheterization.  She wishes to pursue cardiac catheterization for definitive assessment of coronary vasculature and to rule out underlying epicardial occlusive disease as a culprit for her ventricular tachycardia.    Assessment & Plan    Impressions:  Palpitations  Nonsustained ventricular tachycardia on ambulatory ECG  Obesity  Obstructive sleep apnea  Hypertension  Hypertensive cardiovascular disease  Hyperlipidemia  Hypothyroidism  Anxiety  Edema    Recommendations:  Continuation of her current cardiovascular regimen at the present time.     This includes statin, and antihypertensives  Risk benefits and options discussed.  Patient wishes to pursue cardiac catheterization.      Diagnoses and all orders for this visit:    1. Nonsustained ventricular tachycardia (Primary)  -     Case Request Cath Lab: Left Heart Cath  -     COVID PRE-OP / PRE-PROCEDURE SCREENING ORDER (NO ISOLATION) - Swab, Nasopharynx; Future  -     CBC and Differential; Future  -     Basic Metabolic Panel; Future  -     Protime-INR; Future  -     Lipid Panel; Future  -     ECG 12 Lead; Future  -     sodium chloride 0.9 % flush 3 mL  -     sodium chloride 0.9 % flush 3-10 mL  -     sodium chloride 0.9 % infusion 40 mL  -     sodium chloride 0.9 % bolus 330 mL  -     aspirin chewable tablet 324 mg  -     aspirin EC tablet 81 mg  -     ondansetron (ZOFRAN) injection 4 mg  -     methylPREDNISolone sodium succinate (SOLU-Medrol) 125 mg in sodium chloride 0.9 % 100 mL IVPB  -     acetaminophen (TYLENOL) tablet 487.5 mg  -     HYDROcodone-acetaminophen (NORCO) 5-325 MG per tablet 1 tablet  -     nitroglycerin (NITROSTAT) SL tablet 0.4 mg    2. Palpitations  -     Case Request Cath Lab: Left Heart Cath  -     COVID PRE-OP / PRE-PROCEDURE SCREENING ORDER (NO ISOLATION) - Swab, Nasopharynx; Future  -     CBC and Differential; Future  -      Basic Metabolic Panel; Future  -     Protime-INR; Future  -     Lipid Panel; Future  -     ECG 12 Lead; Future  -     sodium chloride 0.9 % flush 3 mL  -     sodium chloride 0.9 % flush 3-10 mL  -     sodium chloride 0.9 % infusion 40 mL  -     sodium chloride 0.9 % bolus 330 mL  -     aspirin chewable tablet 324 mg  -     aspirin EC tablet 81 mg  -     ondansetron (ZOFRAN) injection 4 mg  -     methylPREDNISolone sodium succinate (SOLU-Medrol) 125 mg in sodium chloride 0.9 % 100 mL IVPB  -     acetaminophen (TYLENOL) tablet 487.5 mg  -     HYDROcodone-acetaminophen (NORCO) 5-325 MG per tablet 1 tablet  -     nitroglycerin (NITROSTAT) SL tablet 0.4 mg    3. Primary hypertension  -     Case Request Cath Lab: Left Heart Cath  -     COVID PRE-OP / PRE-PROCEDURE SCREENING ORDER (NO ISOLATION) - Swab, Nasopharynx; Future  -     CBC and Differential; Future  -     Basic Metabolic Panel; Future  -     Protime-INR; Future  -     Lipid Panel; Future  -     ECG 12 Lead; Future  -     sodium chloride 0.9 % flush 3 mL  -     sodium chloride 0.9 % flush 3-10 mL  -     sodium chloride 0.9 % infusion 40 mL  -     sodium chloride 0.9 % bolus 330 mL  -     aspirin chewable tablet 324 mg  -     aspirin EC tablet 81 mg  -     ondansetron (ZOFRAN) injection 4 mg  -     methylPREDNISolone sodium succinate (SOLU-Medrol) 125 mg in sodium chloride 0.9 % 100 mL IVPB  -     acetaminophen (TYLENOL) tablet 487.5 mg  -     HYDROcodone-acetaminophen (NORCO) 5-325 MG per tablet 1 tablet  -     nitroglycerin (NITROSTAT) SL tablet 0.4 mg    4. Mixed hyperlipidemia  -     Case Request Cath Lab: Left Heart Cath  -     COVID PRE-OP / PRE-PROCEDURE SCREENING ORDER (NO ISOLATION) - Swab, Nasopharynx; Future  -     CBC and Differential; Future  -     Basic Metabolic Panel; Future  -     Protime-INR; Future  -     Lipid Panel; Future  -     ECG 12 Lead; Future  -     sodium chloride 0.9 % flush 3 mL  -     sodium chloride 0.9 % flush 3-10 mL  -      "sodium chloride 0.9 % infusion 40 mL  -     sodium chloride 0.9 % bolus 330 mL  -     aspirin chewable tablet 324 mg  -     aspirin EC tablet 81 mg  -     ondansetron (ZOFRAN) injection 4 mg  -     methylPREDNISolone sodium succinate (SOLU-Medrol) 125 mg in sodium chloride 0.9 % 100 mL IVPB  -     acetaminophen (TYLENOL) tablet 487.5 mg  -     HYDROcodone-acetaminophen (NORCO) 5-325 MG per tablet 1 tablet  -     nitroglycerin (NITROSTAT) SL tablet 0.4 mg    Other orders  -     Obtain Informed Consent; Standing  -     Clip Bilateral Groins; Standing  -     Obtain Informed Consent in Pre-Op; Standing  -     Oxygen Therapy- Nasal Cannula; 2 LPM; Standing  -     Obtain Informed Consent in PAT; Future  -     CBC & Differential; Standing  -     Basic Metabolic Panel; Standing  -     Protime-INR; Standing  -     Lipid Panel; Standing  -     XR Chest PA & Lateral; Standing  -     ECG 12 Lead Pre-Op / Pre-Procedure; Standing  -     Insert Peripheral IV; Standing  -     Saline Lock & Maintain IV Access; Standing  -     POC Urine Pregnancy; Standing              Objective:    Vitals:  Vitals:    02/29/24 1522   BP: 120/81   Pulse: 74   SpO2: 97%   Weight: 122 kg (270 lb)   Height: 160 cm (63\")         Body mass index is 47.83 kg/m².      Physical Exam:    General: Alert, cooperative, no distress, appears stated age  Head:  Normocephalic, atraumatic, mucous membranes moist  Eyes:  Conjunctiva/corneas clear, EOM's intact     Neck:  Supple,  no bruit    Lungs: Clear to auscultation bilaterally, no wheezes rhonchi rales are noted  Chest wall: No tenderness  Heart::  Regular rate and rhythm, S1 and S2 normal, 1/6 holosystolic murmur.  No rub or gallop  Abdomen: Soft, non-tender, nondistended bowel sounds active.  Obese  Extremities: No cyanosis, clubbing.  2+ edema  Pulses: 2+ and symmetric all extremities  Skin:  No rashes or lesions  Neuro/psych: A&O x3. CN II through XII are grossly intact with appropriate " affect      Allergies:  Allergies   Allergen Reactions    Clarithromycin Irritability    Yeast Hives     HIVES AND SOME ITCHING. DOES NOT DO YEAST FREE DIET       Medication Review:     Current Outpatient Medications:     atorvastatin (LIPITOR) 20 MG tablet, TAKE 1 TABLET BY MOUTH DAILY, Disp: 30 tablet, Rfl: 3    clomiPRAMINE (ANAFRANIL) 50 MG capsule, 5 po q d, Disp: 150 capsule, Rfl: 6    clonazePAM (KlonoPIN) 0.5 MG tablet, TK 1 T PO TID PRA, Disp: , Rfl: 5    ferrous sulfate 324 MG tablet delayed-release, Take 1 tablet by mouth Every Other Day. (Patient taking differently: Take 1 tablet by mouth Every Other Day. Q3 days), Disp: 60 tablet, Rfl: 3    fluticasone (FLONASE) 50 MCG/ACT nasal spray, USE 2 SPRAYS IN EACH NOSTRIL DAILY AS DIRECTED BY PROVIDER, Disp: 48 g, Rfl: 1    furosemide (Lasix) 20 MG tablet, Take 1 tablet by mouth Daily., Disp: 90 tablet, Rfl: 3    KLOR-CON 20 MEQ CR tablet, TAKE 1 TABLET BY MOUTH DAILY, Disp: 30 tablet, Rfl: 3    levothyroxine (SYNTHROID, LEVOTHROID) 75 MCG tablet, TAKE 1 TABLET BY MOUTH DAILY, Disp: 90 tablet, Rfl: 1    loratadine (CLARITIN) 10 MG tablet, Take 1 tablet by mouth Daily., Disp: , Rfl:     losartan (COZAAR) 100 MG tablet, TAKE 1 TABLET BY MOUTH DAILY, Disp: 90 tablet, Rfl: 1    nebivolol (BYSTOLIC) 10 MG tablet, Take 1 tablet by mouth Daily., Disp: 90 tablet, Rfl: 1    omeprazole (priLOSEC) 20 MG capsule, TAKE 1 CAPSULE BY MOUTH TWICE A DAY, Disp: 60 capsule, Rfl: 3    OXcarbazepine (TRILEPTAL) 600 MG tablet, Take 2 tablets by mouth Every Morning., Disp: , Rfl:     VRAYLAR 3 MG capsule, TK 1 C PO D, Disp: , Rfl:     Family History:  Family History   Problem Relation Age of Onset    Hypertension Mother     Atrial fibrillation Mother     Diabetes Mother     Arthritis Mother     Hypertension Father     Diabetes Father     Bipolar disorder Father     Kidney cancer Father     Anxiety disorder Father     Mental illness Father         Ocd    Anxiety disorder Sister      Migraines Sister     Hypertension Brother     OCD Daughter        Past Medical History:  Past Medical History:   Diagnosis Date    Allergic     Anxiety     Arrhythmia     Arthritis     Bipolar 1 disorder     Cancer Oct. 2023    Renal cell carcinoma    Chronic kidney disease     Depression     GERD (gastroesophageal reflux disease)     Hyperlipidemia     Hypertension     Hypothyroidism     Obesity     OCD (obsessive compulsive disorder)     RASHARD (obstructive sleep apnea)     cpap       Past Surgical History:  Past Surgical History:   Procedure Laterality Date     SECTION      x1    NEPHRECTOMY PARTIAL Left     SINUS SURGERY         Social History:  Social History     Socioeconomic History    Marital status:     Number of children: 1   Tobacco Use    Smoking status: Never     Passive exposure: Never    Smokeless tobacco: Never   Vaping Use    Vaping Use: Never used   Substance and Sexual Activity    Alcohol use: Not Currently     Comment: Social    Drug use: Never    Sexual activity: Yes     Partners: Male     Birth control/protection: Condom       Review of Systems:  The following systems were reviewed as they relate to the cardiovascular system: Constitutional, Eyes, ENT, Cardiovascular, Respiratory, Gastrointestinal, Integumentary, Neurological, Psychiatric, Hematologic, Endocrine, Musculoskeletal, and Genitourinary. The pertinent cardiovascular findings are reported above with all other cardiovascular points within those systems being negative.    Diagnostic Study Review:     Current Electrocardiogram:  Procedures no new EKG. EKG dated 2024 demonstrates sinus rhythm with a ventricular rate of 90 bpm.    Laboratory Data:  Lab Results   Component Value Date    GLUCOSE 92 2024    BUN 19 2024    CREATININE 1.11 (H) 2024    EGFRIFNONA 91 2021    BCR 17.1 2024    K 4.3 2024    CO2 26.0 2024    CALCIUM 9.5 2024    ALBUMIN 4.3 2024    AST  18 01/25/2024    ALT 21 01/25/2024     Lab Results   Component Value Date    GLUCOSE 92 01/25/2024    CALCIUM 9.5 01/25/2024     01/25/2024    K 4.3 01/25/2024    CO2 26.0 01/25/2024     01/25/2024    BUN 19 01/25/2024    CREATININE 1.11 (H) 01/25/2024    EGFRIFNONA 91 02/08/2021    BCR 17.1 01/25/2024    ANIONGAP 10.0 01/25/2024     Lab Results   Component Value Date    WBC 6.49 01/25/2024    HGB 11.8 (L) 01/25/2024    HCT 37.4 01/25/2024    MCV 92.1 01/25/2024     01/25/2024     Lab Results   Component Value Date    CHOL 146 07/20/2023    TRIG 77 07/20/2023    HDL 46 07/20/2023    LDL 85 07/20/2023     Lab Results   Component Value Date    HGBA1C 5.60 01/02/2024     Lab Results   Component Value Date    INR 1.0 10/18/2017    PROTIME 11.1 10/18/2017       Most Recent Echo:  Results for orders placed during the hospital encounter of 05/19/20    Adult Transthoracic Echo Complete W/ Cont if Necessary Per Protocol    Interpretation Summary  · Left ventricular wall thickness is consistent with mild concentric hypertrophy.  · Estimated EF = 65%.  · Left ventricular systolic function is normal.  · Left atrial cavity size is mildly dilated.  · Mild mitral valve regurgitation is present  · Mild tricuspid valve regurgitation is present.       Most Recent Stress Test:  Results for orders placed during the hospital encounter of 02/02/21    Treadmill Stress Test    Interpretation Summary  · No ECG evidence of myocardial ischemia.Negative clinical evidence of myocardial ischemia. Findings consistent with a normal ECG stress test.  · Reached only 81% of the maximal yesterday controlled heart rate secondary to physical limitation       Most Recent Cardiac Catheterization:   No results found for this or any previous visit.       NOTE:     Today,02/29/2024 ,the following portions of the patient's note were reviewed, confirmed and/or updated as appropriate: History of present illness/Interval history, physical  "examination, assessment & plan, allergies, current medications, past family history, past medical history, past social history, past surgical history and problem list.    Labs pertinent to today's visit on 02/29/2024 (including but not limited to CBC, CMP, and lipid profiles) were requested from the patient's primary care provider/hospital/clinical laboratory.  If the labs were available for the visit, they were reviewed with the patient.  If they were not available, when received, special interest will be made to the labs pertinent to this visit.  The patient's most recent \"in-house\" labs are noted below and have been reviewed.  Outside labs pertinent to this visit are scanned into the record and have been reviewed.    Discussions held today, 02/29/2024,regarding procedures included risk, benefits, and options including but not limited to: Death, MI, stroke, pain, bleeding, infection, and possible need for vascular/thoracic/cardiothoracic surgery.    Copied information within this note was reviewed and is current as of 02/29/2024.    Assessment and plan noted herein represents the current plan of care as of 02/29/2024.    Significant resources from our office and staff are inherent in engaging this patient today,02/29/2024,and in a continuous and active collaborative plan of care related to their chronic cardiovascular conditions outlined below.  The management of these conditions requires the direction of our service with specialized clinical knowledge, skills, and experience.  This collaborative care includes but is not limited to patient education, expectations and responsibilities, shared decision making around therapeutic goals, and shared commitments to achieve those goals.  "

## 2024-02-29 ENCOUNTER — OFFICE VISIT (OUTPATIENT)
Dept: CARDIOLOGY | Facility: CLINIC | Age: 49
End: 2024-02-29
Payer: COMMERCIAL

## 2024-02-29 VITALS
HEART RATE: 74 BPM | DIASTOLIC BLOOD PRESSURE: 81 MMHG | OXYGEN SATURATION: 97 % | HEIGHT: 63 IN | WEIGHT: 270 LBS | BODY MASS INDEX: 47.84 KG/M2 | SYSTOLIC BLOOD PRESSURE: 120 MMHG

## 2024-02-29 DIAGNOSIS — R00.2 PALPITATIONS: ICD-10-CM

## 2024-02-29 DIAGNOSIS — I10 PRIMARY HYPERTENSION: ICD-10-CM

## 2024-02-29 DIAGNOSIS — I47.29 NONSUSTAINED VENTRICULAR TACHYCARDIA: Primary | ICD-10-CM

## 2024-02-29 DIAGNOSIS — E78.2 MIXED HYPERLIPIDEMIA: ICD-10-CM

## 2024-02-29 PROCEDURE — 99214 OFFICE O/P EST MOD 30 MIN: CPT | Performed by: INTERNAL MEDICINE

## 2024-02-29 RX ORDER — ACETAMINOPHEN 325 MG/1
500 TABLET ORAL EVERY 4 HOURS PRN
OUTPATIENT
Start: 2024-02-29

## 2024-02-29 RX ORDER — NITROGLYCERIN 0.4 MG/1
0.4 TABLET SUBLINGUAL
OUTPATIENT
Start: 2024-02-29

## 2024-02-29 RX ORDER — SODIUM CHLORIDE 0.9 % (FLUSH) 0.9 %
3 SYRINGE (ML) INJECTION EVERY 12 HOURS SCHEDULED
OUTPATIENT
Start: 2024-02-29

## 2024-02-29 RX ORDER — ASPIRIN 81 MG/1
324 TABLET, CHEWABLE ORAL ONCE
OUTPATIENT
Start: 2024-02-29 | End: 2024-02-29

## 2024-02-29 RX ORDER — ONDANSETRON 2 MG/ML
4 INJECTION INTRAMUSCULAR; INTRAVENOUS EVERY 6 HOURS PRN
OUTPATIENT
Start: 2024-02-29

## 2024-02-29 RX ORDER — SODIUM CHLORIDE 9 MG/ML
40 INJECTION, SOLUTION INTRAVENOUS AS NEEDED
OUTPATIENT
Start: 2024-02-29

## 2024-02-29 RX ORDER — SODIUM CHLORIDE 0.9 % (FLUSH) 0.9 %
3-10 SYRINGE (ML) INJECTION AS NEEDED
OUTPATIENT
Start: 2024-02-29

## 2024-02-29 RX ORDER — ASPIRIN 81 MG/1
81 TABLET ORAL DAILY
OUTPATIENT
Start: 2024-02-29

## 2024-02-29 RX ORDER — HYDROCODONE BITARTRATE AND ACETAMINOPHEN 5; 325 MG/1; MG/1
1 TABLET ORAL EVERY 4 HOURS PRN
OUTPATIENT
Start: 2024-02-29 | End: 2024-03-10

## 2024-03-04 ENCOUNTER — LAB (OUTPATIENT)
Dept: LAB | Facility: HOSPITAL | Age: 49
End: 2024-03-04
Payer: COMMERCIAL

## 2024-03-04 ENCOUNTER — HOSPITAL ENCOUNTER (OUTPATIENT)
Dept: CARDIOLOGY | Facility: HOSPITAL | Age: 49
Discharge: HOME | End: 2024-03-04
Payer: COMMERCIAL

## 2024-03-04 DIAGNOSIS — R00.2 PALPITATIONS: ICD-10-CM

## 2024-03-04 DIAGNOSIS — E78.2 MIXED HYPERLIPIDEMIA: ICD-10-CM

## 2024-03-04 DIAGNOSIS — I47.29 NONSUSTAINED VENTRICULAR TACHYCARDIA: ICD-10-CM

## 2024-03-04 DIAGNOSIS — I10 PRIMARY HYPERTENSION: ICD-10-CM

## 2024-03-04 LAB
ANION GAP SERPL CALCULATED.3IONS-SCNC: 11.1 MMOL/L (ref 5–15)
BASOPHILS # BLD AUTO: 0.03 10*3/MM3 (ref 0–0.2)
BASOPHILS NFR BLD AUTO: 0.4 % (ref 0–1.5)
BUN SERPL-MCNC: 12 MG/DL (ref 6–20)
BUN/CREAT SERPL: 12.2 (ref 7–25)
CALCIUM SPEC-SCNC: 9 MG/DL (ref 8.6–10.5)
CHLORIDE SERPL-SCNC: 100 MMOL/L (ref 98–107)
CHOLEST SERPL-MCNC: 154 MG/DL (ref 0–200)
CO2 SERPL-SCNC: 24.9 MMOL/L (ref 22–29)
CREAT SERPL-MCNC: 0.98 MG/DL (ref 0.57–1)
DEPRECATED RDW RBC AUTO: 44.6 FL (ref 37–54)
EGFRCR SERPLBLD CKD-EPI 2021: 71.8 ML/MIN/1.73
EOSINOPHIL # BLD AUTO: 0.11 10*3/MM3 (ref 0–0.4)
EOSINOPHIL NFR BLD AUTO: 1.5 % (ref 0.3–6.2)
ERYTHROCYTE [DISTWIDTH] IN BLOOD BY AUTOMATED COUNT: 13.8 % (ref 12.3–15.4)
GLUCOSE SERPL-MCNC: 89 MG/DL (ref 65–99)
HCT VFR BLD AUTO: 36.2 % (ref 34–46.6)
HDLC SERPL-MCNC: 47 MG/DL (ref 40–60)
HGB BLD-MCNC: 11.7 G/DL (ref 12–15.9)
IMM GRANULOCYTES # BLD AUTO: 0.02 10*3/MM3 (ref 0–0.05)
IMM GRANULOCYTES NFR BLD AUTO: 0.3 % (ref 0–0.5)
INR PPP: 1.01 (ref 0.93–1.1)
LDLC SERPL CALC-MCNC: 87 MG/DL (ref 0–100)
LDLC/HDLC SERPL: 1.8 {RATIO}
LYMPHOCYTES # BLD AUTO: 1.52 10*3/MM3 (ref 0.7–3.1)
LYMPHOCYTES NFR BLD AUTO: 20.9 % (ref 19.6–45.3)
MCH RBC QN AUTO: 29 PG (ref 26.6–33)
MCHC RBC AUTO-ENTMCNC: 32.3 G/DL (ref 31.5–35.7)
MCV RBC AUTO: 89.8 FL (ref 79–97)
MONOCYTES # BLD AUTO: 0.54 10*3/MM3 (ref 0.1–0.9)
MONOCYTES NFR BLD AUTO: 7.4 % (ref 5–12)
NEUTROPHILS NFR BLD AUTO: 5.04 10*3/MM3 (ref 1.7–7)
NEUTROPHILS NFR BLD AUTO: 69.5 % (ref 42.7–76)
NRBC BLD AUTO-RTO: 0 /100 WBC (ref 0–0.2)
PLATELET # BLD AUTO: 236 10*3/MM3 (ref 140–450)
PMV BLD AUTO: 10.8 FL (ref 6–12)
POTASSIUM SERPL-SCNC: 4.1 MMOL/L (ref 3.5–5.2)
PROTHROMBIN TIME: 11 SECONDS (ref 9.6–11.7)
RBC # BLD AUTO: 4.03 10*6/MM3 (ref 3.77–5.28)
SARS-COV-2 RNA RESP QL NAA+PROBE: NOT DETECTED
SODIUM SERPL-SCNC: 136 MMOL/L (ref 136–145)
TRIGL SERPL-MCNC: 113 MG/DL (ref 0–150)
VLDLC SERPL-MCNC: 20 MG/DL (ref 5–40)
WBC NRBC COR # BLD AUTO: 7.26 10*3/MM3 (ref 3.4–10.8)

## 2024-03-04 PROCEDURE — 93005 ELECTROCARDIOGRAM TRACING: CPT | Performed by: INTERNAL MEDICINE

## 2024-03-04 PROCEDURE — 80048 BASIC METABOLIC PNL TOTAL CA: CPT

## 2024-03-04 PROCEDURE — 36415 COLL VENOUS BLD VENIPUNCTURE: CPT

## 2024-03-04 PROCEDURE — 80061 LIPID PANEL: CPT

## 2024-03-04 PROCEDURE — 87635 SARS-COV-2 COVID-19 AMP PRB: CPT

## 2024-03-04 PROCEDURE — 85025 COMPLETE CBC W/AUTO DIFF WBC: CPT

## 2024-03-04 PROCEDURE — 85610 PROTHROMBIN TIME: CPT

## 2024-03-05 ENCOUNTER — HOSPITAL ENCOUNTER (OUTPATIENT)
Facility: HOSPITAL | Age: 49
Setting detail: HOSPITAL OUTPATIENT SURGERY
Discharge: HOME | End: 2024-03-05
Attending: INTERNAL MEDICINE | Admitting: INTERNAL MEDICINE
Payer: COMMERCIAL

## 2024-03-05 VITALS
BODY MASS INDEX: 48.37 KG/M2 | WEIGHT: 273 LBS | OXYGEN SATURATION: 97 % | HEART RATE: 76 BPM | DIASTOLIC BLOOD PRESSURE: 63 MMHG | RESPIRATION RATE: 16 BRPM | HEIGHT: 63 IN | TEMPERATURE: 97.6 F | SYSTOLIC BLOOD PRESSURE: 122 MMHG

## 2024-03-05 DIAGNOSIS — I47.29 NONSUSTAINED VENTRICULAR TACHYCARDIA: ICD-10-CM

## 2024-03-05 DIAGNOSIS — R00.2 PALPITATIONS: ICD-10-CM

## 2024-03-05 DIAGNOSIS — E78.2 MIXED HYPERLIPIDEMIA: ICD-10-CM

## 2024-03-05 DIAGNOSIS — I10 PRIMARY HYPERTENSION: ICD-10-CM

## 2024-03-05 PROCEDURE — C1894 INTRO/SHEATH, NON-LASER: HCPCS | Performed by: INTERNAL MEDICINE

## 2024-03-05 PROCEDURE — 93458 L HRT ARTERY/VENTRICLE ANGIO: CPT | Performed by: INTERNAL MEDICINE

## 2024-03-05 PROCEDURE — 25010000002 DIPHENHYDRAMINE PER 50 MG: Performed by: INTERNAL MEDICINE

## 2024-03-05 PROCEDURE — 25510000001 IOPAMIDOL PER 1 ML: Performed by: INTERNAL MEDICINE

## 2024-03-05 PROCEDURE — 25010000002 FENTANYL CITRATE (PF) 100 MCG/2ML SOLUTION: Performed by: INTERNAL MEDICINE

## 2024-03-05 PROCEDURE — 25010000002 MIDAZOLAM PER 1 MG: Performed by: INTERNAL MEDICINE

## 2024-03-05 PROCEDURE — C1769 GUIDE WIRE: HCPCS | Performed by: INTERNAL MEDICINE

## 2024-03-05 PROCEDURE — 99153 MOD SED SAME PHYS/QHP EA: CPT | Performed by: INTERNAL MEDICINE

## 2024-03-05 PROCEDURE — 99152 MOD SED SAME PHYS/QHP 5/>YRS: CPT | Performed by: INTERNAL MEDICINE

## 2024-03-05 PROCEDURE — 25010000002 LIDOCAINE 1 % SOLUTION: Performed by: INTERNAL MEDICINE

## 2024-03-05 RX ORDER — SODIUM CHLORIDE 0.9 % (FLUSH) 0.9 %
3 SYRINGE (ML) INJECTION EVERY 12 HOURS SCHEDULED
Status: DISCONTINUED | OUTPATIENT
Start: 2024-03-05 | End: 2024-03-05 | Stop reason: HOSPADM

## 2024-03-05 RX ORDER — SODIUM CHLORIDE 9 MG/ML
40 INJECTION, SOLUTION INTRAVENOUS AS NEEDED
Status: DISCONTINUED | OUTPATIENT
Start: 2024-03-05 | End: 2024-03-05 | Stop reason: HOSPADM

## 2024-03-05 RX ORDER — ACETAMINOPHEN 325 MG/1
650 TABLET ORAL EVERY 4 HOURS PRN
Status: DISCONTINUED | OUTPATIENT
Start: 2024-03-05 | End: 2024-03-05 | Stop reason: HOSPADM

## 2024-03-05 RX ORDER — ONDANSETRON 2 MG/ML
4 INJECTION INTRAMUSCULAR; INTRAVENOUS EVERY 6 HOURS PRN
Status: DISCONTINUED | OUTPATIENT
Start: 2024-03-05 | End: 2024-03-05 | Stop reason: HOSPADM

## 2024-03-05 RX ORDER — ONDANSETRON 4 MG/1
4 TABLET, ORALLY DISINTEGRATING ORAL EVERY 6 HOURS PRN
Status: DISCONTINUED | OUTPATIENT
Start: 2024-03-05 | End: 2024-03-05 | Stop reason: HOSPADM

## 2024-03-05 RX ORDER — ASPIRIN 81 MG/1
81 TABLET ORAL DAILY
Status: DISCONTINUED | OUTPATIENT
Start: 2024-03-05 | End: 2024-03-05

## 2024-03-05 RX ORDER — NITROGLYCERIN 0.4 MG/1
0.4 TABLET SUBLINGUAL
Status: DISCONTINUED | OUTPATIENT
Start: 2024-03-05 | End: 2024-03-05 | Stop reason: HOSPADM

## 2024-03-05 RX ORDER — ALUMINA, MAGNESIA, AND SIMETHICONE 2400; 2400; 240 MG/30ML; MG/30ML; MG/30ML
15 SUSPENSION ORAL EVERY 6 HOURS PRN
Status: DISCONTINUED | OUTPATIENT
Start: 2024-03-05 | End: 2024-03-05 | Stop reason: HOSPADM

## 2024-03-05 RX ORDER — ONDANSETRON 2 MG/ML
4 INJECTION INTRAMUSCULAR; INTRAVENOUS EVERY 6 HOURS PRN
Status: DISCONTINUED | OUTPATIENT
Start: 2024-03-05 | End: 2024-03-05 | Stop reason: SDUPTHER

## 2024-03-05 RX ORDER — HYDROCODONE BITARTRATE AND ACETAMINOPHEN 5; 325 MG/1; MG/1
1 TABLET ORAL EVERY 4 HOURS PRN
Status: DISCONTINUED | OUTPATIENT
Start: 2024-03-05 | End: 2024-03-05 | Stop reason: HOSPADM

## 2024-03-05 RX ORDER — NICOTINE 21 MG/24HR
1 PATCH, TRANSDERMAL 24 HOURS TRANSDERMAL DAILY PRN
Status: DISCONTINUED | OUTPATIENT
Start: 2024-03-05 | End: 2024-03-05 | Stop reason: HOSPADM

## 2024-03-05 RX ORDER — DIPHENHYDRAMINE HYDROCHLORIDE 50 MG/ML
INJECTION INTRAMUSCULAR; INTRAVENOUS
Status: DISCONTINUED | OUTPATIENT
Start: 2024-03-05 | End: 2024-03-05 | Stop reason: HOSPADM

## 2024-03-05 RX ORDER — LIDOCAINE HYDROCHLORIDE 10 MG/ML
INJECTION, SOLUTION INFILTRATION; PERINEURAL
Status: DISCONTINUED | OUTPATIENT
Start: 2024-03-05 | End: 2024-03-05 | Stop reason: HOSPADM

## 2024-03-05 RX ORDER — METHYLPREDNISOLONE SODIUM SUCCINATE 125 MG/2ML
125 INJECTION, POWDER, LYOPHILIZED, FOR SOLUTION INTRAMUSCULAR; INTRAVENOUS ONCE
Status: DISCONTINUED | OUTPATIENT
Start: 2024-03-05 | End: 2024-03-05 | Stop reason: HOSPADM

## 2024-03-05 RX ORDER — SODIUM CHLORIDE 0.9 % (FLUSH) 0.9 %
3-10 SYRINGE (ML) INJECTION AS NEEDED
Status: DISCONTINUED | OUTPATIENT
Start: 2024-03-05 | End: 2024-03-05 | Stop reason: HOSPADM

## 2024-03-05 RX ORDER — DIPHENHYDRAMINE HCL 25 MG
25 CAPSULE ORAL EVERY 6 HOURS PRN
Status: DISCONTINUED | OUTPATIENT
Start: 2024-03-05 | End: 2024-03-05 | Stop reason: HOSPADM

## 2024-03-05 RX ORDER — SODIUM CHLORIDE 9 MG/ML
250 INJECTION, SOLUTION INTRAVENOUS ONCE AS NEEDED
Status: DISCONTINUED | OUTPATIENT
Start: 2024-03-05 | End: 2024-03-05 | Stop reason: HOSPADM

## 2024-03-05 RX ORDER — FENTANYL CITRATE 50 UG/ML
INJECTION, SOLUTION INTRAMUSCULAR; INTRAVENOUS
Status: DISCONTINUED | OUTPATIENT
Start: 2024-03-05 | End: 2024-03-05 | Stop reason: HOSPADM

## 2024-03-05 RX ORDER — SODIUM CHLORIDE 9 MG/ML
30 INJECTION, SOLUTION INTRAVENOUS CONTINUOUS
Status: DISCONTINUED | OUTPATIENT
Start: 2024-03-05 | End: 2024-03-05 | Stop reason: HOSPADM

## 2024-03-05 RX ORDER — MIDAZOLAM HYDROCHLORIDE 1 MG/ML
INJECTION INTRAMUSCULAR; INTRAVENOUS
Status: DISCONTINUED | OUTPATIENT
Start: 2024-03-05 | End: 2024-03-05 | Stop reason: HOSPADM

## 2024-03-05 RX ORDER — ACETAMINOPHEN 500 MG
500 TABLET ORAL EVERY 4 HOURS PRN
Status: DISCONTINUED | OUTPATIENT
Start: 2024-03-05 | End: 2024-03-05 | Stop reason: SDUPTHER

## 2024-03-05 RX ORDER — ASPIRIN 81 MG/1
324 TABLET, CHEWABLE ORAL ONCE
Status: DISCONTINUED | OUTPATIENT
Start: 2024-03-05 | End: 2024-03-05

## 2024-03-05 NOTE — LETTER
March 5, 2024     Patient: Olga Garcia   YOB: 1975   Date of Visit: 3/5/2024       To Whom It May Concern:    It is my medical opinion that Olga Garcia may return to work on 3/11/24 . She had a procedure done on 3/5/24.           Sincerely,      Dr. Ramírez

## 2024-03-11 ENCOUNTER — TELEPHONE (OUTPATIENT)
Dept: CARDIOLOGY | Facility: CLINIC | Age: 49
End: 2024-03-11
Payer: COMMERCIAL

## 2024-03-11 LAB
QT INTERVAL: 401 MS
QTC INTERVAL: 471 MS

## 2024-03-11 NOTE — TELEPHONE ENCOUNTER
"[facility] staff attempted to follow warm transfer process and was unsuccessful     Caller: Olga Garcia \"Jazmin Garcia\"    Relationship to patient: Self    Best call back number: [phone]    Patient is needing: TO SCHEDULE A SOONER APPT/ SPEAK TO A NURSE. SHE'S STILL HAVING THE CHEST PAIN AND WANTED TO KNOW WHAT THE NEXT STEPS SHOULD BE  SINCE SHE'S STILL HAVING ISSUES  "

## 2024-03-12 RX ORDER — NEBIVOLOL 10 MG/1
10 TABLET ORAL DAILY
Qty: 30 TABLET | Refills: 3 | Status: SHIPPED | OUTPATIENT
Start: 2024-03-12

## 2024-03-13 NOTE — TELEPHONE ENCOUNTER
Spoke with pt she is aware she will reach out to her PCP and discuss possible GI referral for chest pain. She will call the office with any other concerns.

## 2024-03-19 DIAGNOSIS — E87.6 HYPOKALEMIA: ICD-10-CM

## 2024-03-19 RX ORDER — POTASSIUM CHLORIDE 1500 MG/1
20 TABLET, EXTENDED RELEASE ORAL DAILY
Qty: 30 TABLET | Refills: 3 | Status: SHIPPED | OUTPATIENT
Start: 2024-03-19

## 2024-03-20 ENCOUNTER — TELEPHONE (OUTPATIENT)
Dept: CARDIOLOGY | Facility: CLINIC | Age: 49
End: 2024-03-20

## 2024-03-20 NOTE — TELEPHONE ENCOUNTER
"  Caller: Olga Garcia \"Jazmin Garcia\"    Relationship: Self    Best call back number:  [phone]        What was the call regarding: PATIENT'S PSYCHIATRIST WOULD LIKE A CALL IN REGARDS TO THE STATEMENT THAT PSYCH MEDS MAYBE CAUSING VENTRICULAR TACHARDIA.PLEASE CALL:     NAME: DR JAISON VALENCIA  [phone]        "

## 2024-03-21 NOTE — TELEPHONE ENCOUNTER
I spoke with Dr Rivers- she can take the medication for short term. Message left for patient-    OK for [facility] to relay

## 2024-03-21 NOTE — TELEPHONE ENCOUNTER
"     Caller: Olga Garcia \"Jazmin Garcia\"    Relationship to patient: Self    Best call back number:  [phone]    Patient is needing: PATIENT HAS A SINUS INFECTION. SHE IS CHECKING TO SEE IF ITS OK TO TAKE STEROID MEDROL BECAUSE OF TACHYCARDIA.       PATIENT STATED HER TESTING ALL CAME BACK CLEAR. SHE IS CONCERNED IF HER CHEST PRESSURE COMES BACK IF SHE SHOULD BE CONCERNED AS FAR AS CARDIOLOGY. SHE IS NOT HAVING THAT PRESSURE RIGHT NOW.          "

## 2024-04-01 ENCOUNTER — TELEPHONE (OUTPATIENT)
Dept: CARDIOLOGY | Facility: CLINIC | Age: 49
End: 2024-04-01

## 2024-04-01 NOTE — TELEPHONE ENCOUNTER
"    Caller: Olga Garcia \"Jazmin Garcia\"    Relationship to patient: Self    Best call back number: [phone]    Chief complaint:     Type of visit: FOLLOW UP     Requested date: AS SOON AS POSSIBLE     If rescheduling, when is the original appointment:  04.11.24    Additional notes:PATIENT STATED THAT SHE HAS TO WORK AND HAS TO RESCHEDULE 04.11.24 WITH DR. QIU AT [facility]. PLEASE CONTACT PATIENT TO RESCHEDULE APPOINTMENT AND IT IS MORE CONVENIENT FOR HER TO BE SEEN AT [facility].           "

## 2024-04-10 ENCOUNTER — OFFICE VISIT (OUTPATIENT)
Dept: CARDIOLOGY | Facility: CLINIC | Age: 49
End: 2024-04-10
Payer: COMMERCIAL

## 2024-04-10 VITALS
SYSTOLIC BLOOD PRESSURE: 121 MMHG | HEART RATE: 70 BPM | BODY MASS INDEX: 49.43 KG/M2 | OXYGEN SATURATION: 95 % | HEIGHT: 63 IN | WEIGHT: 279 LBS | DIASTOLIC BLOOD PRESSURE: 78 MMHG

## 2024-04-10 DIAGNOSIS — R00.2 PALPITATIONS: ICD-10-CM

## 2024-04-10 DIAGNOSIS — E78.2 MIXED HYPERLIPIDEMIA: Primary | ICD-10-CM

## 2024-04-10 DIAGNOSIS — I10 PRIMARY HYPERTENSION: ICD-10-CM

## 2024-04-10 PROCEDURE — 99214 OFFICE O/P EST MOD 30 MIN: CPT | Performed by: INTERNAL MEDICINE

## 2024-04-10 NOTE — PROGRESS NOTES
Cardiology Office Visit      Encounter Date:  04/10/2024    Patient ID:   Olga Garcia is a 48 y.o. female.    Reason For Followup:  Palpitations    Brief Clinical History:  Dear Ivelisse Goncalves APRN    I had the pleasure of seeing Olga Garcia today. As you are well aware, this is a 48 y.o. female with no established history of ischemic heart disease.  She does have a history of palpitations, hypothyroidism, hypertension, obstructive sleep apnea, anxiety, and hyperlipidemia.  She presents today for follow-up on the above conditions.    Interval History:  She denies any chest pain pressure heaviness or tightness.  She denies any shortness of breath out of character.  She denies any PND orthopnea.  She denies any syncope or near syncope.  Other than some occasional palpitations, she reports feeling well.    Her blood pressure issues have completely resolved and she is normotensive today in the office.  She is tolerating her antihypertensive regimen quite nicely.    She does report that she was found to have a kidney mass.  She is going to have surgery by Dr. Cates to have this removed.    Since she is doing quite well from a cardiac and blood pressure perspective, I have given her the option to follow-up as needed and to continue to follow-up with you.    01/30/2024    She had some chest pressure last week. Felt like a small animal was on her chest. Went to ER and potassium was low. She got potassium and magnesium. Still having the pressure whe she sits on couch when she is doing school stuff. Will last a couple seconds and goes away. This feels distinctly different than her palpitations. Will do 2 week MCOT.    02/29/2024        As a result of her above symptoms she underwent a 2-week mobile cardiac telemetry.  Her study was abnormal with 1 episode of ventricular tachycardia that lasted 5 beats with a peak rate of 146 bpm.  She was symptomatic with this episode.    We do lengthy discussion regarding  "ventricular tachycardia and its potential causative etiologies.  We discussed risk benefits and options including cardiac catheterization.  She wishes to pursue cardiac catheterization for definitive assessment of coronary vasculature and to rule out underlying epicardial occlusive disease as a culprit for her ventricular tachycardia.    04/10/2024        She reports that she had no symptoms of palpitations since her last visit in the office.  We had a lengthy discussion regarding QT prolongation and its effect on her PVCs and palpitations.  She is currently followed by psychiatry for her medications and I have personally spoken to psychiatry.  Any titration of medication or addition of medication should be followed by close monitoring of ECG.  Additionally we discussed nonpsychiatric medications that could potentially lead to QT prolongation including but not limited to antibiotics.  She will make sure that these are brought to the attention of her primary if they are needed in the future.    Assessment & Plan    Impressions:  Palpitations  Nonsustained ventricular tachycardia on ambulatory ECG  Obesity  Obstructive sleep apnea  Hypertension  Hypertensive cardiovascular disease  Hyperlipidemia  Hypothyroidism  Anxiety  Edema    Recommendations:  Continuation of her current cardiovascular regimen at the present time.     This includes statin, and antihypertensives  Follow-up in 6 months time sooner should there be difficulties.      Diagnoses and all orders for this visit:    1. Mixed hyperlipidemia (Primary)    2. Primary hypertension    3. Palpitations                Objective:    Vitals:  Vitals:    04/10/24 1448   BP: 121/78   Pulse: 70   SpO2: 95%   Weight: 127 kg (279 lb)   Height: 160 cm (63\")           Body mass index is 49.42 kg/m².      Physical Exam:    General: Alert, cooperative, no distress, appears stated age  Head:  Normocephalic, atraumatic, mucous membranes moist  Eyes:  Conjunctiva/corneas clear, " EOM's intact     Neck:  Supple,  no bruit    Lungs: Clear to auscultation bilaterally, no wheezes rhonchi rales are noted  Chest wall: No tenderness  Heart::  Regular rate and rhythm, S1 and S2 normal, 1/6 holosystolic murmur.  No rub or gallop  Abdomen: Soft, non-tender, nondistended bowel sounds active.  Obese  Extremities: No cyanosis, clubbing.  2+ edema  Pulses: 2+ and symmetric all extremities  Skin:  No rashes or lesions  Neuro/psych: A&O x3. CN II through XII are grossly intact with appropriate affect      Allergies:  Allergies   Allergen Reactions    Clarithromycin Irritability    Yeast Hives     HIVES AND SOME ITCHING. DOES NOT DO YEAST FREE DIET       Medication Review:     Current Outpatient Medications:     atorvastatin (LIPITOR) 20 MG tablet, TAKE 1 TABLET BY MOUTH DAILY, Disp: 30 tablet, Rfl: 3    clomiPRAMINE (ANAFRANIL) 50 MG capsule, 5 po q d, Disp: 150 capsule, Rfl: 6    clonazePAM (KlonoPIN) 0.5 MG tablet, TK 1 T PO TID PRA, Disp: , Rfl: 5    ferrous sulfate 324 MG tablet delayed-release, Take 1 tablet by mouth Every Other Day., Disp: 60 tablet, Rfl: 3    fluticasone (FLONASE) 50 MCG/ACT nasal spray, USE 2 SPRAYS IN EACH NOSTRIL DAILY AS DIRECTED BY PROVIDER, Disp: 48 g, Rfl: 1    furosemide (Lasix) 20 MG tablet, Take 1 tablet by mouth Daily., Disp: 90 tablet, Rfl: 3    KLOR-CON 20 MEQ CR tablet, TAKE 1 TABLET BY MOUTH DAILY, Disp: 30 tablet, Rfl: 3    levothyroxine (SYNTHROID, LEVOTHROID) 75 MCG tablet, TAKE 1 TABLET BY MOUTH DAILY, Disp: 90 tablet, Rfl: 1    loratadine (CLARITIN) 10 MG tablet, Take 1 tablet by mouth Daily., Disp: , Rfl:     losartan (COZAAR) 100 MG tablet, TAKE 1 TABLET BY MOUTH DAILY, Disp: 90 tablet, Rfl: 1    nebivolol (BYSTOLIC) 10 MG tablet, TAKE 1 TABLET BY MOUTH DAILY, Disp: 30 tablet, Rfl: 3    omeprazole (priLOSEC) 20 MG capsule, TAKE 1 CAPSULE BY MOUTH TWICE A DAY, Disp: 60 capsule, Rfl: 3    OXcarbazepine (TRILEPTAL) 600 MG tablet, Take 2 tablets by mouth Every  Morning., Disp: , Rfl:     VRAYLAR 3 MG capsule, TK 1 C PO D, Disp: , Rfl:     Family History:  Family History   Problem Relation Age of Onset    Hypertension Mother     Atrial fibrillation Mother     Diabetes Mother     Arthritis Mother     Hypertension Father     Diabetes Father     Bipolar disorder Father     Kidney cancer Father     Anxiety disorder Father     Mental illness Father         Ocd    Anxiety disorder Sister     Migraines Sister     Hypertension Brother     OCD Daughter        Past Medical History:  Past Medical History:   Diagnosis Date    Allergic     Anxiety     Arrhythmia     Arthritis     Bipolar 1 disorder     Cancer Oct. 2023    Renal cell carcinoma    Chronic kidney disease     Depression     GERD (gastroesophageal reflux disease)     Hyperlipidemia     Hypertension     Hypothyroidism     Obesity     OCD (obsessive compulsive disorder)     RASHARD (obstructive sleep apnea)     cpap       Past Surgical History:  Past Surgical History:   Procedure Laterality Date    CARDIAC CATHETERIZATION N/A 3/5/2024    Procedure: Left Heart Cath;  Surgeon: Kashif Ramírez DO;  Location: [facility] CATH INVASIVE LOCATION;  Service: Cardiovascular;  Laterality: N/A;     SECTION      x1    NEPHRECTOMY PARTIAL Left     SINUS SURGERY         Social History:  Social History     Socioeconomic History    Marital status:     Number of children: 1   Tobacco Use    Smoking status: Never     Passive exposure: Never    Smokeless tobacco: Never   Vaping Use    Vaping status: Never Used   Substance and Sexual Activity    Alcohol use: Not Currently     Comment: Social    Drug use: Never    Sexual activity: Yes     Partners: Male     Birth control/protection: Condom       Review of Systems:  The following systems were reviewed as they relate to the cardiovascular system: Constitutional, Eyes, ENT, Cardiovascular, Respiratory, Gastrointestinal, Integumentary, Neurological, Psychiatric, Hematologic,  Endocrine, Musculoskeletal, and Genitourinary. The pertinent cardiovascular findings are reported above with all other cardiovascular points within those systems being negative.    Diagnostic Study Review:     Current Electrocardiogram:  Procedures no new EKG. EKG dated 4 March 2024 demonstrates sinus rhythm with a ventricular rate of 83 bpm.  Nonspecific repolarization changes.  Normal QT and prolonged QTc intervals of 401 and 471 ms respectively.    Laboratory Data:  Lab Results   Component Value Date    GLUCOSE 89 03/04/2024    BUN 12 03/04/2024    CREATININE 0.98 03/04/2024    EGFRIFNONA 91 02/08/2021    BCR 12.2 03/04/2024    K 4.1 03/04/2024    CO2 24.9 03/04/2024    CALCIUM 9.0 03/04/2024    ALBUMIN 4.3 01/25/2024    AST 18 01/25/2024    ALT 21 01/25/2024     Lab Results   Component Value Date    GLUCOSE 89 03/04/2024    CALCIUM 9.0 03/04/2024     03/04/2024    K 4.1 03/04/2024    CO2 24.9 03/04/2024     03/04/2024    BUN 12 03/04/2024    CREATININE 0.98 03/04/2024    EGFRIFNONA 91 02/08/2021    BCR 12.2 03/04/2024    ANIONGAP 11.1 03/04/2024     Lab Results   Component Value Date    WBC 5.60 03/05/2024    HGB 12.0 03/05/2024    HCT 37.2 03/05/2024    MCV 91.0 03/05/2024     03/05/2024     Lab Results   Component Value Date    CHOL 154 03/04/2024    TRIG 113 03/04/2024    HDL 47 03/04/2024    LDL 87 03/04/2024     Lab Results   Component Value Date    HGBA1C 5.60 01/02/2024     Lab Results   Component Value Date    INR 1.01 03/04/2024    INR 1.0 10/18/2017    PROTIME 11.0 03/04/2024    PROTIME 11.1 10/18/2017       Most Recent Echo:  Results for orders placed during the hospital encounter of 05/19/20    Adult Transthoracic Echo Complete W/ Cont if Necessary Per Protocol    Interpretation Summary  · Left ventricular wall thickness is consistent with mild concentric hypertrophy.  · Estimated EF = 65%.  · Left ventricular systolic function is normal.  · Left atrial cavity size is mildly  "dilated.  · Mild mitral valve regurgitation is present  · Mild tricuspid valve regurgitation is present.       Most Recent Stress Test:  Results for orders placed during the hospital encounter of 02/02/21    Treadmill Stress Test    Interpretation Summary  · No ECG evidence of myocardial ischemia.Negative clinical evidence of myocardial ischemia. Findings consistent with a normal ECG stress test.  · Reached only 81% of the maximal yesterday controlled heart rate secondary to physical limitation       Most Recent Cardiac Catheterization:   No results found for this or any previous visit.       NOTE:     Today,04/10/2024 ,the following portions of the patient's note were reviewed, confirmed and/or updated as appropriate: History of present illness/Interval history, physical examination, assessment & plan, allergies, current medications, past family history, past medical history, past social history, past surgical history and problem list.    Labs pertinent to today's visit on 04/10/2024 (including but not limited to CBC, CMP, and lipid profiles) were requested from the patient's primary care provider/hospital/clinical laboratory.  If the labs were available for the visit, they were reviewed with the patient.  If they were not available, when received, special interest will be made to the labs pertinent to this visit.  The patient's most recent \"in-house\" labs are noted below and have been reviewed.  Outside labs pertinent to this visit are scanned into the record and have been reviewed.    Discussions held today, 04/10/2024,regarding procedures included risk, benefits, and options including but not limited to: Death, MI, stroke, pain, bleeding, infection, and possible need for vascular/thoracic/cardiothoracic surgery.    Copied information within this note was reviewed and is current as of 04/10/2024.    Assessment and plan noted herein represents the current plan of care as of 04/10/2024.    Significant resources " from our office and staff are inherent in engaging this patient today,04/10/2024,and in a continuous and active collaborative plan of care related to their chronic cardiovascular conditions outlined below.  The management of these conditions requires the direction of our service with specialized clinical knowledge, skills, and experience.  This collaborative care includes but is not limited to patient education, expectations and responsibilities, shared decision making around therapeutic goals, and shared commitments to achieve those goals.

## 2024-04-10 NOTE — PATIENT INSTRUCTIONS
Avoid QT prolonging antibiotics like Cipro, Levaquin or Zpack.  Consider applewatch  Consider EKG with psych med titration  Follow-up 6 months

## 2024-04-26 DIAGNOSIS — R60.0 EDEMA LEG: ICD-10-CM

## 2024-04-26 RX ORDER — FUROSEMIDE 20 MG/1
20 TABLET ORAL DAILY
Qty: 30 TABLET | Refills: 3 | Status: SHIPPED | OUTPATIENT
Start: 2024-04-26

## 2024-04-29 RX ORDER — LOSARTAN POTASSIUM 100 MG/1
100 TABLET ORAL DAILY
Qty: 90 TABLET | Refills: 0 | Status: SHIPPED | OUTPATIENT
Start: 2024-04-29

## 2024-05-24 RX ORDER — OMEPRAZOLE 20 MG/1
CAPSULE, DELAYED RELEASE ORAL
Qty: 60 CAPSULE | Refills: 3 | Status: SHIPPED | OUTPATIENT
Start: 2024-05-24

## 2024-05-28 ENCOUNTER — TELEPHONE (OUTPATIENT)
Dept: CARDIOLOGY | Facility: CLINIC | Age: 49
End: 2024-05-28

## 2024-05-28 NOTE — TELEPHONE ENCOUNTER
"  Caller: Olga Garcia \"Jazmin Garcia\"    Relationship: Self    Best call back number:  [phone]        PATIENT WAS LOOKING AT HER LAST OFFICE VISIT NOT AND NOTICED IT SAID \"CONSIDER EKG WITH PSYCH MED TITRATION\" AND SHE IS JUST WONDERING WHAT THAT IS?    "

## 2024-05-28 NOTE — TELEPHONE ENCOUNTER
My chart message sent. He had discussed this during the visit as some medications can cause QT wave changes and they would want to continue to monitor EKGs for changes.

## 2024-06-03 ENCOUNTER — OFFICE VISIT (OUTPATIENT)
Dept: FAMILY MEDICINE CLINIC | Facility: CLINIC | Age: 49
End: 2024-06-03
Payer: COMMERCIAL

## 2024-06-03 VITALS
BODY MASS INDEX: 50.04 KG/M2 | HEIGHT: 63 IN | RESPIRATION RATE: 18 BRPM | WEIGHT: 282.4 LBS | SYSTOLIC BLOOD PRESSURE: 122 MMHG | HEART RATE: 80 BPM | DIASTOLIC BLOOD PRESSURE: 66 MMHG | OXYGEN SATURATION: 99 %

## 2024-06-03 DIAGNOSIS — Z12.11 SCREENING FOR COLON CANCER: ICD-10-CM

## 2024-06-03 DIAGNOSIS — Z12.31 ENCOUNTER FOR SCREENING MAMMOGRAM FOR MALIGNANT NEOPLASM OF BREAST: ICD-10-CM

## 2024-06-03 DIAGNOSIS — E66.01 CLASS 3 SEVERE OBESITY WITH SERIOUS COMORBIDITY AND BODY MASS INDEX (BMI) OF 50.0 TO 59.9 IN ADULT, UNSPECIFIED OBESITY TYPE: Primary | ICD-10-CM

## 2024-06-03 PROBLEM — N28.89 RENAL MASS: Status: ACTIVE | Noted: 2023-12-06

## 2024-06-03 PROBLEM — C79.01: Status: ACTIVE | Noted: 2024-01-05

## 2024-06-03 PROCEDURE — 99214 OFFICE O/P EST MOD 30 MIN: CPT | Performed by: NURSE PRACTITIONER

## 2024-06-03 RX ORDER — SEMAGLUTIDE 1.34 MG/ML
0.25 INJECTION, SOLUTION SUBCUTANEOUS WEEKLY
Qty: 2 ML | Refills: 1 | Status: SHIPPED | OUTPATIENT
Start: 2024-06-03

## 2024-06-03 NOTE — PROGRESS NOTES
"Chief Complaint  Weight Loss (Discuss weight loss medication )  Subjective        Olga Garcia presents to [facility]  History of Present Illness  Pt comes in today to discuss weight loss options.   She is currently trying WW plan and drinking more water.   For exercise she is walking about 3 times/week. Maybe 1 mile about 20 min   Other than WW hasn't tried many other diets in the past other than trying to count calories.    Weight Loss    Review of Systems   Constitutional:  Positive for unexpected weight loss.     Objective     Vital Signs:   /66   Pulse 80   Resp 18   Ht 160 cm (62.99\")   Wt 128 kg (282 lb 6.4 oz)   SpO2 99%   BMI 50.04 kg/m²       BP Readings from Last 3 Encounters:   06/03/24 122/66   04/10/24 121/78   03/05/24 122/63       Wt Readings from Last 3 Encounters:   06/03/24 128 kg (282 lb 6.4 oz)   04/10/24 127 kg (279 lb)   03/05/24 124 kg (273 lb)     Physical Exam  Constitutional:       Appearance: She is well-developed. She is obese.   Eyes:      Pupils: Pupils are equal, round, and reactive to light.   Cardiovascular:      Rate and Rhythm: Normal rate and regular rhythm.   Pulmonary:      Effort: Pulmonary effort is normal.      Breath sounds: Normal breath sounds.   Neurological:      Mental Status: She is alert and oriented to person, place, and time.      Result Review :                 Assessment and Plan    Diagnoses and all orders for this visit:    1. Class 3 severe obesity with serious comorbidity and body mass index (BMI) of 50.0 to 59.9 in adult, unspecified obesity type (Primary)  -     Semaglutide,0.25 or 0.5MG/DOS, (Ozempic, 0.25 or 0.5 MG/DOSE,) 2 MG/1.5ML solution pen-injector; Inject 0.25 mg under the skin into the appropriate area as directed 1 (One) Time Per Week. Compound with b12  Dispense: 2 mL; Refill: 1    2. Screening for colon cancer  -     Ambulatory Referral For Screening Colonoscopy    3. Encounter for screening " mammogram for malignant neoplasm of breast  -     Mammo Screening Digital Tomosynthesis Bilateral With CAD; Future    Will start ozempic (compounded). Consent signed  Discussed potential side effects and questions answered  Discussed diet and exercise plan  She will cont with WW plan, and try to increase exercise.   Recommend 5 days/week and increase intensity   Will follow up in 1 month for recheck and if tolerating will increase dose  Pt is also due for mammogram and colonoscopy   During this office visit, we discussed the pertinent aspects of the visit and treatment recommendations. Pt verbalizes understanding. Follow up was discussed. Patient was given the opportunity to ask questions and discuss other concerns.     I spent 30 minutes caring for Olga on this date of service. This time includes time spent by me in the following activities:performing a medically appropriate examination and/or evaluation , counseling and educating the patient/family/caregiver, ordering medications, tests, or procedures, and documenting information in the medical record    Follow Up   Return in about 4 weeks (around 7/1/2024).  Patient was given instructions and counseling regarding her condition or for health maintenance advice. Please see specific information pulled into the AVS if appropriate.

## 2024-06-14 RX ORDER — ATORVASTATIN CALCIUM 20 MG/1
20 TABLET, FILM COATED ORAL DAILY
Qty: 30 TABLET | Refills: 3 | Status: SHIPPED | OUTPATIENT
Start: 2024-06-14

## 2024-06-22 DIAGNOSIS — E87.6 HYPOKALEMIA: ICD-10-CM

## 2024-06-24 RX ORDER — POTASSIUM CHLORIDE 1500 MG/1
20 TABLET, EXTENDED RELEASE ORAL DAILY
Qty: 30 TABLET | Refills: 3 | Status: SHIPPED | OUTPATIENT
Start: 2024-06-24

## 2024-06-26 ENCOUNTER — TELEPHONE (OUTPATIENT)
Dept: CARDIOLOGY | Facility: CLINIC | Age: 49
End: 2024-06-26

## 2024-06-26 NOTE — TELEPHONE ENCOUNTER
"  Caller: Olga Garcia \"Jazmin Garcia\"    Relationship: Self    Best call back number:  [phone]      PATIENT IS NEEDING A CARDIAC CLEARANCE FOR A COLONOSCOPY. THE REQUEST WAS FAXED TO [facility] JUNE 12TH, PLEASE CALL THE PATIENT TO CONFIRM THIS HAS BEEN COMPLETED.  "

## 2024-07-01 RX ORDER — NEBIVOLOL 10 MG/1
10 TABLET ORAL DAILY
Qty: 30 TABLET | Refills: 3 | Status: SHIPPED | OUTPATIENT
Start: 2024-07-01

## 2024-07-29 RX ORDER — LOSARTAN POTASSIUM 100 MG/1
100 TABLET ORAL DAILY
Qty: 90 TABLET | Refills: 0 | Status: SHIPPED | OUTPATIENT
Start: 2024-07-29

## 2024-08-26 ENCOUNTER — PATIENT MESSAGE (OUTPATIENT)
Dept: FAMILY MEDICINE CLINIC | Facility: CLINIC | Age: 49
End: 2024-08-26
Payer: COMMERCIAL

## 2024-08-26 DIAGNOSIS — R60.0 EDEMA LEG: ICD-10-CM

## 2024-08-26 RX ORDER — LEVOTHYROXINE SODIUM 75 UG/1
75 TABLET ORAL DAILY
Qty: 90 TABLET | Refills: 1 | Status: SHIPPED | OUTPATIENT
Start: 2024-08-26

## 2024-08-26 RX ORDER — FUROSEMIDE 20 MG
20 TABLET ORAL DAILY
Qty: 30 TABLET | Refills: 3 | Status: SHIPPED | OUTPATIENT
Start: 2024-08-26

## 2024-08-28 DIAGNOSIS — R60.0 EDEMA LEG: ICD-10-CM

## 2024-08-28 RX ORDER — FUROSEMIDE 20 MG
20 TABLET ORAL DAILY
Qty: 30 TABLET | Refills: 3 | OUTPATIENT
Start: 2024-08-28

## 2024-08-30 ENCOUNTER — OFFICE VISIT (OUTPATIENT)
Dept: FAMILY MEDICINE CLINIC | Facility: CLINIC | Age: 49
End: 2024-08-30
Payer: COMMERCIAL

## 2024-08-30 ENCOUNTER — LAB (OUTPATIENT)
Dept: FAMILY MEDICINE CLINIC | Facility: CLINIC | Age: 49
End: 2024-08-30
Payer: COMMERCIAL

## 2024-08-30 VITALS
HEART RATE: 73 BPM | OXYGEN SATURATION: 96 % | DIASTOLIC BLOOD PRESSURE: 78 MMHG | HEIGHT: 63 IN | BODY MASS INDEX: 48.39 KG/M2 | RESPIRATION RATE: 18 BRPM | WEIGHT: 273.13 LBS | SYSTOLIC BLOOD PRESSURE: 124 MMHG

## 2024-08-30 DIAGNOSIS — N95.1 HOT FLASHES DUE TO MENOPAUSE: ICD-10-CM

## 2024-08-30 DIAGNOSIS — R20.0 NUMBNESS AND TINGLING IN BOTH HANDS: Primary | ICD-10-CM

## 2024-08-30 DIAGNOSIS — R20.2 NUMBNESS AND TINGLING IN BOTH HANDS: Primary | ICD-10-CM

## 2024-08-30 LAB
CRP SERPL-MCNC: 0.59 MG/DL (ref 0–0.5)
HBA1C MFR BLD: 5.7 % (ref 4.8–5.6)
TSH SERPL DL<=0.05 MIU/L-ACNC: 0.79 UIU/ML (ref 0.27–4.2)

## 2024-08-30 PROCEDURE — 36415 COLL VENOUS BLD VENIPUNCTURE: CPT | Performed by: STUDENT IN AN ORGANIZED HEALTH CARE EDUCATION/TRAINING PROGRAM

## 2024-08-30 PROCEDURE — 86140 C-REACTIVE PROTEIN: CPT | Performed by: STUDENT IN AN ORGANIZED HEALTH CARE EDUCATION/TRAINING PROGRAM

## 2024-08-30 PROCEDURE — 83036 HEMOGLOBIN GLYCOSYLATED A1C: CPT | Performed by: STUDENT IN AN ORGANIZED HEALTH CARE EDUCATION/TRAINING PROGRAM

## 2024-08-30 PROCEDURE — 86200 CCP ANTIBODY: CPT | Performed by: STUDENT IN AN ORGANIZED HEALTH CARE EDUCATION/TRAINING PROGRAM

## 2024-08-30 PROCEDURE — 99214 OFFICE O/P EST MOD 30 MIN: CPT | Performed by: STUDENT IN AN ORGANIZED HEALTH CARE EDUCATION/TRAINING PROGRAM

## 2024-08-30 PROCEDURE — 84443 ASSAY THYROID STIM HORMONE: CPT | Performed by: STUDENT IN AN ORGANIZED HEALTH CARE EDUCATION/TRAINING PROGRAM

## 2024-08-30 NOTE — PROGRESS NOTES
"Chief Complaint  Tingling (In bilateral hands - left worse. Started couple months ago, but getting worse.  )    Subjective        Olga Garcia presents to [facility]  History of Present Illness  Jazmin is a 49-year-old with history of renal cell carcinoma, other chronic issues who presents today due to bilateral hand tingling feeling.  States that has been ongoing for last couple months but is slightly getting worse.  Denies any other symptoms.  Denies any weakness or dropping of things.  She has been worried due to her history of RCC that is could be a complication of that. She didn't receive chemotherapy for the RCC. It is worse at night    Hot flashes for months  Also has been going through hot flashes. Felt that she has been trying everything without much relief of the symptoms. She has even put her head in the freezer or refrigerator to help.       Objective   Vital Signs:  /78   Pulse 73   Resp 18   Ht 160 cm (63\")   Wt 124 kg (273 lb 2 oz)   SpO2 96%   BMI 48.38 kg/m²   Estimated body mass index is 48.38 kg/m² as calculated from the following:    Height as of this encounter: 160 cm (63\").    Weight as of this encounter: 124 kg (273 lb 2 oz).            Physical Exam  Vitals and nursing note reviewed.   Constitutional:       General: She is not in acute distress.     Appearance: Normal appearance.   Cardiovascular:      Rate and Rhythm: Normal rate and regular rhythm.      Pulses: Normal pulses.      Heart sounds: Normal heart sounds. No murmur heard.  Pulmonary:      Effort: Pulmonary effort is normal. No respiratory distress.      Breath sounds: Normal breath sounds.   Abdominal:      General: Abdomen is flat. Bowel sounds are normal.      Palpations: Abdomen is soft.      Tenderness: There is no abdominal tenderness.   Musculoskeletal:         General: No swelling, tenderness, deformity or signs of injury. Normal range of motion.      Right lower leg: No " edema.      Left lower leg: No edema.      Comments: Tinel and phalens testing negative, no manipulation caused the numbness or tingling   Neurological:      Mental Status: She is alert and oriented to person, place, and time. Mental status is at baseline.   Psychiatric:         Mood and Affect: Mood normal.         Behavior: Behavior normal.        Result Review :  The following data was reviewed by: Jose Zaman MD on 08/30/2024:  Common labs          3/4/2024    16:57 3/5/2024    08:08 8/30/2024    10:36   Common Labs   Glucose 89      BUN 12      Creatinine 0.98      Sodium 136      Potassium 4.1      Chloride 100      Calcium 9.0      WBC 7.26  5.60        Hemoglobin 11.7  12.0        Hematocrit 36.2  37.2        Platelets 236  214        Total Cholesterol 154      Triglycerides 113      HDL Cholesterol 47      LDL Cholesterol  87      Hemoglobin A1C   5.70       Details          This result is from an external source.             Data reviewed : previous notes           Assessment and Plan   Diagnoses and all orders for this visit:    1. Numbness and tingling in both hands (Primary)  -     Cancel: TSH Rfx On Abnormal To Free T4; Future  -     Cancel: C-reactive protein; Future  -     Cancel: Hemoglobin A1c; Future  -     Cancel: Cyclic Citrul Peptide Antibody, IgG / IgA; Future  -     TSH Rfx On Abnormal To Free T4  -     Hemoglobin A1c  -     Cyclic Citrul Peptide Antibody, IgG / IgA  -     C-reactive Protein  -     EMG & Nerve Conduction Test; Future    2. Hot flashes due to menopause    Other orders  -     Fezolinetant (VEOZAH) 45 MG tablet; Take 1 tablet by mouth Daily.  Dispense: 90 tablet; Refill: 1  -     Clotrimazole 1 % ointment; Apply 1 Application topically 2 (Two) Times a Day As Needed (rash). Use up to two weeks a time, then give it a break  Dispense: 56.7 g; Refill: 0    For her numbness and tingling in Bilateral UE  Negative carpal tunnel testing, other PE not conclusive.  Performed workup  including thyroid, A1C, crp that were negative  Will do an EMG  If that os negative, consider imaging of the neck or rheumatologic workuip    For her hotflashes  Discussed estrogen and SNRI, but she would like the medicine with the least side effects  Will try veozah          Follow Up   Return if symptoms worsen or fail to improve.  Patient was given instructions and counseling regarding her condition or for health maintenance advice. Please see specific information pulled into the AVS if appropriate.

## 2024-08-31 LAB — CCP IGA+IGG SERPL IA-ACNC: 6 UNITS (ref 0–19)

## 2024-09-11 PROBLEM — R00.2 PALPITATIONS: Status: RESOLVED | Noted: 2021-11-03 | Resolved: 2024-09-11

## 2024-09-11 PROBLEM — S40.022A HEMATOMA OF ARM, LEFT, INITIAL ENCOUNTER: Status: RESOLVED | Noted: 2020-10-19 | Resolved: 2024-09-11

## 2024-09-11 PROBLEM — J01.00 ACUTE NON-RECURRENT MAXILLARY SINUSITIS: Status: RESOLVED | Noted: 2022-08-17 | Resolved: 2024-09-11

## 2024-09-20 ENCOUNTER — OFFICE VISIT (OUTPATIENT)
Dept: FAMILY MEDICINE CLINIC | Facility: CLINIC | Age: 49
End: 2024-09-20
Payer: COMMERCIAL

## 2024-09-20 VITALS
BODY MASS INDEX: 46.87 KG/M2 | HEIGHT: 63 IN | RESPIRATION RATE: 16 BRPM | DIASTOLIC BLOOD PRESSURE: 82 MMHG | WEIGHT: 264.5 LBS | SYSTOLIC BLOOD PRESSURE: 140 MMHG | OXYGEN SATURATION: 98 % | HEART RATE: 73 BPM

## 2024-09-20 DIAGNOSIS — R20.2 NUMBNESS AND TINGLING IN BOTH HANDS: ICD-10-CM

## 2024-09-20 DIAGNOSIS — R20.0 NUMBNESS AND TINGLING IN BOTH HANDS: ICD-10-CM

## 2024-09-20 DIAGNOSIS — R23.2 HOT FLASHES: Primary | ICD-10-CM

## 2024-09-20 DIAGNOSIS — R53.83 FATIGUE, UNSPECIFIED TYPE: ICD-10-CM

## 2024-09-20 PROCEDURE — 99417 PROLNG OP E/M EACH 15 MIN: CPT | Performed by: STUDENT IN AN ORGANIZED HEALTH CARE EDUCATION/TRAINING PROGRAM

## 2024-09-20 PROCEDURE — 99215 OFFICE O/P EST HI 40 MIN: CPT | Performed by: STUDENT IN AN ORGANIZED HEALTH CARE EDUCATION/TRAINING PROGRAM

## 2024-10-08 ENCOUNTER — OFFICE VISIT (OUTPATIENT)
Dept: FAMILY MEDICINE CLINIC | Facility: CLINIC | Age: 49
End: 2024-10-08
Payer: COMMERCIAL

## 2024-10-08 ENCOUNTER — TELEPHONE (OUTPATIENT)
Dept: FAMILY MEDICINE CLINIC | Facility: CLINIC | Age: 49
End: 2024-10-08

## 2024-10-08 VITALS
OXYGEN SATURATION: 99 % | SYSTOLIC BLOOD PRESSURE: 138 MMHG | HEART RATE: 87 BPM | RESPIRATION RATE: 18 BRPM | BODY MASS INDEX: 46.95 KG/M2 | HEIGHT: 63 IN | WEIGHT: 265 LBS | DIASTOLIC BLOOD PRESSURE: 86 MMHG

## 2024-10-08 DIAGNOSIS — R42 VERTIGO: Primary | ICD-10-CM

## 2024-10-08 DIAGNOSIS — F41.9 ANXIETY: ICD-10-CM

## 2024-10-08 PROCEDURE — 99213 OFFICE O/P EST LOW 20 MIN: CPT | Performed by: INTERNAL MEDICINE

## 2024-10-08 NOTE — PATIENT INSTRUCTIONS
Vertigo    - Meclizine as needed    - PT for Epley    - Hold imaging for now    - Follow up with psych as scheduled    - Follow up as needed

## 2024-10-08 NOTE — PROGRESS NOTES
Chief Complaint  Dizziness    HPI:    Olga Garcia presents to [facility]    Patient is a 49-year-old female with a history of hypertension, hyperlipidemia, hypothyroidism, nonsustained ventricular tachycardia, obesity, anxiety, RASHARD, and renal cancer presenting for evaluation of dizziness.    Patient reports that she had an episode of dizziness for which she saw Ivelisse Acevedo. She was diagnosed with vertigo. She was recommended to trial meclizine but did not take. She most recently had episode of room spinning dizziness on Saturday that took a few hours to resolve. Denies neurological symptoms including facial droop, slurred speech, focal sensory/motor deficit.  Denies headache, blurred vision, double vision, tinnitus, photophobia, phonophobia. Denies chest pain, shortness of breath, orthopnea, PND, lower extremity edema, palpitations, tachycardia, or syncope. Blood pressure overall has been good. Denies recent illness Denies runny nose, sore throat, cough, congestion, ear pain/pressure, sinus pain/pressure. Mood overall poor. Patient reports that she went off Vryalar about two months ago. She has been on anxiety, depression, OCG and wanted to trial off medication due to potential cardiac side effect. Mood has been increasing anxious off medication. She also has RASHARD on CPAP. Denies new medications or changes in medications other than. Denies significant alcohol, tobacco, or recreational drug use.  Denies recent significant blood loss, bruising. She has not been staying well-hydrated.         Review of Systems:  ROS negative unless otherwise noted in HPI above.    Past Medical History:   Diagnosis Date    Allergic     Anxiety     Arrhythmia     Arthritis     Bipolar 1 disorder     Cancer Oct. 2023    Renal cell carcinoma    Chronic kidney disease     Depression     GERD (gastroesophageal reflux disease)     Hyperlipidemia     Hypertension     Hypothyroidism     Obesity     OCD  (obsessive compulsive disorder)     RASHARD (obstructive sleep apnea)     cpap         Current Outpatient Medications:     atorvastatin (LIPITOR) 20 MG tablet, TAKE 1 TABLET BY MOUTH DAILY, Disp: 30 tablet, Rfl: 3    clomiPRAMINE (ANAFRANIL) 50 MG capsule, 5 po q d, Disp: 150 capsule, Rfl: 6    clonazePAM (KlonoPIN) 0.5 MG tablet, TK 1 T PO TID PRA, Disp: , Rfl: 5    Clotrimazole 1 % ointment, Apply 1 Application topically 2 (Two) Times a Day As Needed (rash). Use up to two weeks a time, then give it a break, Disp: 56.7 g, Rfl: 0    ferrous sulfate 324 MG tablet delayed-release, Take 1 tablet by mouth Every Other Day., Disp: 60 tablet, Rfl: 3    Fezolinetant (VEOZAH) 45 MG tablet, Take 1 tablet by mouth Daily., Disp: 90 tablet, Rfl: 1    fluticasone (FLONASE) 50 MCG/ACT nasal spray, USE 2 SPRAYS IN EACH NOSTRIL DAILY AS DIRECTED BY PROVIDER, Disp: 48 g, Rfl: 1    furosemide (LASIX) 20 MG tablet, TAKE 1 TABLET BY MOUTH DAILY, Disp: 30 tablet, Rfl: 3    Klor-Con M20 20 MEQ CR tablet, TAKE 1 TABLET BY MOUTH DAILY, Disp: 30 tablet, Rfl: 3    levothyroxine (SYNTHROID, LEVOTHROID) 75 MCG tablet, TAKE 1 TABLET BY MOUTH DAILY, Disp: 90 tablet, Rfl: 1    loratadine (CLARITIN) 10 MG tablet, Take 1 tablet by mouth Daily., Disp: , Rfl:     losartan (COZAAR) 100 MG tablet, TAKE 1 TABLET BY MOUTH DAILY, Disp: 90 tablet, Rfl: 0    nebivolol (BYSTOLIC) 10 MG tablet, TAKE 1 TABLET BY MOUTH DAILY, Disp: 30 tablet, Rfl: 3    omeprazole (priLOSEC) 20 MG capsule, TAKE 1 CAPSULE BY MOUTH 2 TIMES A DAY, Disp: 180 capsule, Rfl: 1    OXcarbazepine (TRILEPTAL) 600 MG tablet, Take 2 tablets by mouth Every Morning., Disp: , Rfl:     Social History     Socioeconomic History    Marital status:     Number of children: 1   Tobacco Use    Smoking status: Never     Passive exposure: Never    Smokeless tobacco: Never   Vaping Use    Vaping status: Never Used   Substance and Sexual Activity    Alcohol use: Not Currently     Comment: Social    Drug  "use: Never    Sexual activity: Yes     Partners: Male     Birth control/protection: Condom        Objective   Vital Signs:  /86   Pulse 87   Resp 18   Ht 160 cm (63\")   Wt 120 kg (265 lb)   SpO2 99%   BMI 46.94 kg/m²   Estimated body mass index is 46.94 kg/m² as calculated from the following:    Height as of this encounter: 160 cm (63\").    Weight as of this encounter: 120 kg (265 lb).    Physical Exam:  General: Well-appearing patient, no apparent distress  HEENT: No posterior pharynx erythema, no tonsillar erythema or exudates, normal external auditory canals, TM normal without bulging or erythema, no facial droop, slurred speech, pupils equal round reactive to light and accommodation, extraocular eye movements intact  Neck: No cervical lymphadenopathy  Cardiac: Regular rate and rhythm, normal S1/S2, no murmur, rubs or gallops, no lower extremity edema  Lungs: Clear to auscultation bilaterally, no crackles or wheezes  Abdomen: Soft, non-tender, no guarding or rebound tenderness, no hepatosplenomegaly  Skin: No significant rashes or lesions  MSK: Grossly normal tone and strength  Neuro: Alert and oriented x3, CN II-XII grossly intact, normal sensation in upper and lower extremities bilaterally  Psych: Patient anxious and tearful during the majority of the office visit    Assessment and Plan:    (R42) Vertigo -   Assessment: Patient with recent history of intermittent episodes of room spinning dizziness consistent with vertigo, BPPV.  Currently asymptomatic.  No red flag symptoms.  Physical exam unremarkable other than tearful and anxious.  Symptoms most likely secondary to vertigo.  Recommend that she trial meclizine, which was previously prescribed, although she was anxious about potentially starting due to side effects.  Suspect that symptoms have recently been amplified in the setting of ongoing anxiety.  Reasonable to continue conservative treatment as she is currently asymptomatic and has not had " recent red flag symptoms, although patient aware of signs and symptoms should prompt reevaluation.  Plan:   - Meclizine as needed  - PT for Epley  - Hold imaging for now  - Follow up with psych as scheduled  - Follow up as needed    (F41.9) Anxiety  Assessment: Follows with psychiatry.  Patient with worsening anxiety symptoms, especially after recent discontinuation of Vraylar.  Patient would likely benefit from adding back Vraylar or considering alternative agent.  Patient already scheduled with follow-up in 2 days with psychiatry to discuss further.  Plan:  - Continue medications as prescribed  - Follow up with psychiatry as scheduled      Patient was given instructions and counseling regarding her condition or for health maintenance advice. Please see specific information pulled into the AVS if appropriate.       Dr Eric Olivares   Internal Medicine Physician  [facility]--[address], [address]  [place] [address]

## 2024-10-08 NOTE — TELEPHONE ENCOUNTER
"  Caller: Olga Garcia \"Jazmin Garcia\"    Relationship: Self    Best call back number: [phone]    PATIENT IS WANTING US TO PLEASE LOOK UP HER RECORDED WEIGHT FOR THE LAST FEW TIMES SHE WAS IN OFFICE AND GIVE HER A CALL.      "

## 2024-10-09 NOTE — PROGRESS NOTES
Cardiology Office Visit      Encounter Date:  10/10/2024    Patient ID:   Olga Garcia is a 49 y.o. female.    Reason For Followup:  Palpitations    Brief Clinical History:  Dear Dr. Cage, Chaparro Lopes, DO    I had the pleasure of seeing Olga Garcia today. As you are well aware, this is a 49 y.o. female with no established history of ischemic heart disease.  She does have a history of palpitations, hypothyroidism, hypertension, obstructive sleep apnea, anxiety, and hyperlipidemia.  She presents today for follow-up on the above conditions.    Interval History:  She denies any chest pain pressure heaviness or tightness.  She denies any shortness of breath out of character.  She denies any PND orthopnea.  She denies any syncope or near syncope.  Other than some occasional palpitations, she reports feeling well.    Her blood pressure issues have completely resolved and she is normotensive today in the office.  She is tolerating her antihypertensive regimen quite nicely.    She does report that she was found to have a kidney mass.  She is going to have surgery by Dr. Cates to have this removed.    Since she is doing quite well from a cardiac and blood pressure perspective, I have given her the option to follow-up as needed and to continue to follow-up with you.    01/30/2024    She had some chest pressure last week. Felt like a small animal was on her chest. Went to ER and potassium was low. She got potassium and magnesium. Still having the pressure whe she sits on couch when she is doing school stuff. Will last a couple seconds and goes away. This feels distinctly different than her palpitations. Will do 2 week MCOT.    02/29/2024        As a result of her above symptoms she underwent a 2-week mobile cardiac telemetry.  Her study was abnormal with 1 episode of ventricular tachycardia that lasted 5 beats with a peak rate of 146 bpm.  She was symptomatic with this episode.    We do lengthy discussion regarding  ventricular tachycardia and its potential causative etiologies.  We discussed risk benefits and options including cardiac catheterization.  She wishes to pursue cardiac catheterization for definitive assessment of coronary vasculature and to rule out underlying epicardial occlusive disease as a culprit for her ventricular tachycardia.    04/10/2024        She reports that she had no symptoms of palpitations since her last visit in the office.  We had a lengthy discussion regarding QT prolongation and its effect on her PVCs and palpitations.  She is currently followed by psychiatry for her medications and I have personally spoken to psychiatry.  Any titration of medication or addition of medication should be followed by close monitoring of ECG.  Additionally we discussed nonpsychiatric medications that could potentially lead to QT prolongation including but not limited to antibiotics.  She will make sure that these are brought to the attention of her primary if they are needed in the future.    10/10/2024        Beginning of August she was taken off vraylar. She had a rought time and had to go back on it. Her palpitations are worse this school year. Discussed increase bystolic but her blood pressure at home is lower. She has also had some vertigo. She has an applewatch and is working trying to figure out the EKG part of it. She is doing weight watcherfs and has lost 20#.    Assessment & Plan    Impressions:  Palpitations  History of nonsustained ventricular tachycardia on ambulatory ECG     Negative cardiac catheterization March 2024  Obesity  Obstructive sleep apnea  Hypertension  Hypertensive cardiovascular disease  Hyperlipidemia  Hypothyroidism  Anxiety  Edema    Recommendations:  Continuation of her current cardiovascular regimen at the present time.     This includes statin, and antihypertensives  Follow-up in 6 months time sooner should there be difficulties.      Diagnoses and all orders for this visit:    1.  "Primary hypertension (Primary)  -     ECG 12 Lead    2. Mixed hyperlipidemia  -     ECG 12 Lead    3. Nonsustained ventricular tachycardia  -     ECG 12 Lead    4. Palpitations  -     ECG 12 Lead    Other orders  -     nebivolol (BYSTOLIC) 10 MG tablet; Take 1 tablet by mouth Daily.  Dispense: 90 tablet; Refill: 3                  Objective:    Vitals:  Vitals:    10/10/24 1532   BP: 138/84   Pulse: 75   SpO2: 98%   Weight: 120 kg (265 lb)   Height: 160 cm (63\")             Body mass index is 46.94 kg/m².      Physical Exam:    General: Alert, cooperative, no distress, appears stated age  Head:  Normocephalic, atraumatic, mucous membranes moist  Eyes:  Conjunctiva/corneas clear, EOM's intact     Neck:  Supple,  no bruit    Lungs: Clear to auscultation bilaterally, no wheezes rhonchi rales are noted  Chest wall: No tenderness  Heart::  Regular rate and rhythm, S1 and S2 normal, 1/6 holosystolic murmur.  No rub or gallop  Abdomen: Soft, non-tender, nondistended bowel sounds active.  Obese  Extremities: No cyanosis, clubbing.  2+ edema  Pulses: 2+ and symmetric all extremities  Skin:  No rashes or lesions  Neuro/psych: A&O x3. CN II through XII are grossly intact with appropriate affect      Allergies:  Allergies   Allergen Reactions    Clarithromycin Irritability    Yeast Hives     HIVES AND SOME ITCHING. DOES NOT DO YEAST FREE DIET       Medication Review:     Current Outpatient Medications:     atorvastatin (LIPITOR) 20 MG tablet, TAKE 1 TABLET BY MOUTH DAILY, Disp: 30 tablet, Rfl: 3    Cariprazine HCl (Vraylar) 1.5 MG capsule capsule, Take 1 capsule by mouth Daily., Disp: , Rfl:     clomiPRAMINE (ANAFRANIL) 50 MG capsule, 5 po q d, Disp: 150 capsule, Rfl: 6    clonazePAM (KlonoPIN) 0.5 MG tablet, TK 1 T PO TID PRA, Disp: , Rfl: 5    Clotrimazole 1 % ointment, Apply 1 Application topically 2 (Two) Times a Day As Needed (rash). Use up to two weeks a time, then give it a break, Disp: 56.7 g, Rfl: 0    ferrous sulfate " 324 MG tablet delayed-release, Take 1 tablet by mouth Every Other Day., Disp: 60 tablet, Rfl: 3    fluticasone (FLONASE) 50 MCG/ACT nasal spray, USE 2 SPRAYS IN EACH NOSTRIL DAILY AS DIRECTED BY PROVIDER, Disp: 48 g, Rfl: 1    furosemide (LASIX) 20 MG tablet, TAKE 1 TABLET BY MOUTH DAILY, Disp: 30 tablet, Rfl: 3    Klor-Con M20 20 MEQ CR tablet, TAKE 1 TABLET BY MOUTH DAILY, Disp: 30 tablet, Rfl: 3    levothyroxine (SYNTHROID, LEVOTHROID) 75 MCG tablet, TAKE 1 TABLET BY MOUTH DAILY, Disp: 90 tablet, Rfl: 1    loratadine (CLARITIN) 10 MG tablet, Take 1 tablet by mouth Daily., Disp: , Rfl:     losartan (COZAAR) 100 MG tablet, TAKE 1 TABLET BY MOUTH DAILY, Disp: 90 tablet, Rfl: 0    nebivolol (BYSTOLIC) 10 MG tablet, Take 1 tablet by mouth Daily., Disp: 90 tablet, Rfl: 3    omeprazole (priLOSEC) 20 MG capsule, TAKE 1 CAPSULE BY MOUTH 2 TIMES A DAY (Patient taking differently: Take 2 capsules by mouth Daily.), Disp: 180 capsule, Rfl: 1    OXcarbazepine (TRILEPTAL) 600 MG tablet, Take 2 tablets by mouth Every Evening., Disp: , Rfl:     Fezolinetant (VEOZAH) 45 MG tablet, Take 1 tablet by mouth Daily. (Patient not taking: Reported on 10/10/2024), Disp: 90 tablet, Rfl: 1    Family History:  Family History   Problem Relation Age of Onset    Hypertension Mother     Atrial fibrillation Mother     Diabetes Mother     Arthritis Mother     Hypertension Father     Diabetes Father     Bipolar disorder Father     Kidney cancer Father     Anxiety disorder Father     Mental illness Father         Ocd    Anxiety disorder Sister     Migraines Sister     Hypertension Brother     OCD Daughter        Past Medical History:  Past Medical History:   Diagnosis Date    Allergic     Anxiety     Arrhythmia     Arthritis     Bipolar 1 disorder     Cancer Oct. 2023    Renal cell carcinoma    Chronic kidney disease     Depression     GERD (gastroesophageal reflux disease)     Hyperlipidemia     Hypertension     Hypothyroidism     Obesity     OCD  (obsessive compulsive disorder)     RASHARD (obstructive sleep apnea)     cpap       Past Surgical History:  Past Surgical History:   Procedure Laterality Date    CARDIAC CATHETERIZATION N/A 2024    Procedure: Left Heart Cath;  Surgeon: Kashif Ramírez DO;  Location: [facility] CATH INVASIVE LOCATION;  Service: Cardiovascular;  Laterality: N/A;     SECTION      x1    NEPHRECTOMY PARTIAL Left     SINUS SURGERY         Social History:  Social History     Socioeconomic History    Marital status:     Number of children: 1   Tobacco Use    Smoking status: Never     Passive exposure: Never    Smokeless tobacco: Never   Vaping Use    Vaping status: Never Used   Substance and Sexual Activity    Alcohol use: Not Currently     Comment: Social    Drug use: Never    Sexual activity: Yes     Partners: Male     Birth control/protection: Condom       Review of Systems:  The following systems were reviewed as they relate to the cardiovascular system: Constitutional, Eyes, ENT, Cardiovascular, Respiratory, Gastrointestinal, Integumentary, Neurological, Psychiatric, Hematologic, Endocrine, Musculoskeletal, and Genitourinary. The pertinent cardiovascular findings are reported above with all other cardiovascular points within those systems being negative.    Diagnostic Study Review:     Current Electrocardiogram:    ECG 12 Lead    Date/Time: 10/10/2024 5:04 PM  Performed by: Kashif Ramírez DO    Authorized by: Kashif Ramírez DO  Comparison: not compared with previous ECG   Previous ECG: no previous ECG available  Comments: Normal sinus rhythm with a ventricular rate of 77 bpm.  Nonspecific repolarization changes.  Normal QT and QTc intervals.  Normal QRS axis.          Laboratory Data:  Lab Results   Component Value Date    GLUCOSE 89 2024    BUN 12 2024    CREATININE 0.98 2024    EGFRIFNONA 91 2021    BCR 12.2 2024    K 4.1 2024    CO2 24.9  03/04/2024    CALCIUM 9.0 03/04/2024    ALBUMIN 4.3 01/25/2024    AST 18 01/25/2024    ALT 21 01/25/2024     Lab Results   Component Value Date    GLUCOSE 89 03/04/2024    CALCIUM 9.0 03/04/2024     03/04/2024    K 4.1 03/04/2024    CO2 24.9 03/04/2024     03/04/2024    BUN 12 03/04/2024    CREATININE 0.98 03/04/2024    EGFRIFNONA 91 02/08/2021    BCR 12.2 03/04/2024    ANIONGAP 11.1 03/04/2024     Lab Results   Component Value Date    WBC 5.58 08/30/2024    HGB 12.1 08/30/2024    HCT 37.8 08/30/2024    MCV 94.7 08/30/2024     08/30/2024     Lab Results   Component Value Date    CHOL 154 03/04/2024    TRIG 113 03/04/2024    HDL 47 03/04/2024    LDL 87 03/04/2024     Lab Results   Component Value Date    HGBA1C 5.70 (H) 08/30/2024     Lab Results   Component Value Date    INR 1.01 03/04/2024    INR 1.0 10/18/2017    PROTIME 11.0 03/04/2024    PROTIME 11.1 10/18/2017       Most Recent Echo:  Results for orders placed during the hospital encounter of 05/19/20    Adult Transthoracic Echo Complete W/ Cont if Necessary Per Protocol    Interpretation Summary  · Left ventricular wall thickness is consistent with mild concentric hypertrophy.  · Estimated EF = 65%.  · Left ventricular systolic function is normal.  · Left atrial cavity size is mildly dilated.  · Mild mitral valve regurgitation is present  · Mild tricuspid valve regurgitation is present.       Most Recent Stress Test:  Results for orders placed during the hospital encounter of 02/02/21    Treadmill Stress Test    Interpretation Summary  · No ECG evidence of myocardial ischemia.Negative clinical evidence of myocardial ischemia. Findings consistent with a normal ECG stress test.  · Reached only 81% of the maximal yesterday controlled heart rate secondary to physical limitation       Most Recent Cardiac Catheterization:   No results found for this or any previous visit.       NOTE:     Today,10/10/2024 ,the following portions of the patient's  "note were reviewed, confirmed and/or updated as appropriate: History of present illness/Interval history, physical examination, assessment & plan, allergies, current medications, past family history, past medical history, past social history, past surgical history and problem list.    Labs pertinent to today's visit on 10/10/2024 (including but not limited to CBC, CMP, and lipid profiles) were requested from the patient's primary care provider/hospital/clinical laboratory.  If the labs were available for the visit, they were reviewed with the patient.  If they were not available, when received, special interest will be made to the labs pertinent to this visit.  The patient's most recent \"in-house\" labs are noted below and have been reviewed.  Outside labs pertinent to this visit are scanned into the record and have been reviewed.    Discussions held today, 10/10/2024,regarding procedures included risk, benefits, and options including but not limited to: Death, MI, stroke, pain, bleeding, infection, and possible need for vascular/thoracic/cardiothoracic surgery.    Copied information within this note was reviewed and is current as of 10/10/2024.    Assessment and plan noted herein represents the current plan of care as of 10/10/2024.    Significant resources from our office and staff are inherent in engaging this patient today,10/10/2024,and in a continuous and active collaborative plan of care related to their chronic cardiovascular conditions outlined below.  The management of these conditions requires the direction of our service with specialized clinical knowledge, skills, and experience.  This collaborative care includes but is not limited to patient education, expectations and responsibilities, shared decision making around therapeutic goals, and shared commitments to achieve those goals.  "

## 2024-10-10 ENCOUNTER — OFFICE VISIT (OUTPATIENT)
Dept: CARDIOLOGY | Facility: CLINIC | Age: 49
End: 2024-10-10
Payer: COMMERCIAL

## 2024-10-10 VITALS
OXYGEN SATURATION: 98 % | BODY MASS INDEX: 46.95 KG/M2 | SYSTOLIC BLOOD PRESSURE: 138 MMHG | DIASTOLIC BLOOD PRESSURE: 84 MMHG | WEIGHT: 265 LBS | HEIGHT: 63 IN | HEART RATE: 75 BPM

## 2024-10-10 DIAGNOSIS — I47.29 NONSUSTAINED VENTRICULAR TACHYCARDIA: ICD-10-CM

## 2024-10-10 DIAGNOSIS — E78.2 MIXED HYPERLIPIDEMIA: ICD-10-CM

## 2024-10-10 DIAGNOSIS — R00.2 PALPITATIONS: ICD-10-CM

## 2024-10-10 DIAGNOSIS — I10 PRIMARY HYPERTENSION: Primary | ICD-10-CM

## 2024-10-10 PROCEDURE — 93000 ELECTROCARDIOGRAM COMPLETE: CPT | Performed by: INTERNAL MEDICINE

## 2024-10-10 PROCEDURE — 99214 OFFICE O/P EST MOD 30 MIN: CPT | Performed by: INTERNAL MEDICINE

## 2024-10-10 RX ORDER — NEBIVOLOL 10 MG/1
10 TABLET ORAL DAILY
Qty: 90 TABLET | Refills: 3 | Status: SHIPPED | OUTPATIENT
Start: 2024-10-10

## 2024-10-11 RX ORDER — ATORVASTATIN CALCIUM 20 MG/1
20 TABLET, FILM COATED ORAL DAILY
Qty: 30 TABLET | Refills: 3 | Status: SHIPPED | OUTPATIENT
Start: 2024-10-11 | End: 2024-10-14

## 2024-10-14 RX ORDER — ATORVASTATIN CALCIUM 20 MG/1
20 TABLET, FILM COATED ORAL DAILY
Qty: 30 TABLET | Refills: 3 | Status: SHIPPED | OUTPATIENT
Start: 2024-10-14

## 2024-10-25 RX ORDER — LOSARTAN POTASSIUM 100 MG/1
100 TABLET ORAL DAILY
Qty: 90 TABLET | Refills: 0 | Status: SHIPPED | OUTPATIENT
Start: 2024-10-25 | End: 2024-10-28

## 2024-10-28 RX ORDER — LOSARTAN POTASSIUM 100 MG/1
100 TABLET ORAL DAILY
Qty: 90 TABLET | Refills: 0 | Status: SHIPPED | OUTPATIENT
Start: 2024-10-28

## 2024-11-11 ENCOUNTER — TELEPHONE (OUTPATIENT)
Dept: FAMILY MEDICINE CLINIC | Facility: CLINIC | Age: 49
End: 2024-11-11

## 2024-11-11 NOTE — TELEPHONE ENCOUNTER
"  Caller: Olga Garcia \"Jazmin Garcia\"    Relationship: Self    Best call back number:[phone]     What is the best time to reach you: ANY    Who are you requesting to speak with (clinical staff, provider,  specific staff member):         What was the call regarding: PATIENT HAD GOTTEN A FLU SHOT THROUGH WORK AND THERE IS A KNOT WHERE SHE HAD GOTTEN HER FLU SHOT. PATIENT JUST WANTED TO MAKE SURE SHE DIDN'T NEED TO BE SEEN FOR THIS OR WHAT ARE YOUR THOUGHTS ON THIS?    PLEASE CALL    "

## 2024-11-15 DIAGNOSIS — E87.6 HYPOKALEMIA: ICD-10-CM

## 2024-11-15 RX ORDER — POTASSIUM CHLORIDE 1500 MG/1
20 TABLET, EXTENDED RELEASE ORAL DAILY
Qty: 30 TABLET | Refills: 3 | Status: SHIPPED | OUTPATIENT
Start: 2024-11-15

## 2024-11-19 ENCOUNTER — APPOINTMENT (OUTPATIENT)
Dept: GENERAL RADIOLOGY | Facility: HOSPITAL | Age: 49
End: 2024-11-19
Payer: COMMERCIAL

## 2024-11-19 ENCOUNTER — HOSPITAL ENCOUNTER (EMERGENCY)
Facility: HOSPITAL | Age: 49
Discharge: HOME | End: 2024-11-19
Attending: EMERGENCY MEDICINE | Admitting: EMERGENCY MEDICINE
Payer: COMMERCIAL

## 2024-11-19 VITALS
DIASTOLIC BLOOD PRESSURE: 79 MMHG | HEART RATE: 84 BPM | BODY MASS INDEX: 47.93 KG/M2 | HEIGHT: 63 IN | WEIGHT: 270.5 LBS | TEMPERATURE: 97.7 F | OXYGEN SATURATION: 94 % | SYSTOLIC BLOOD PRESSURE: 140 MMHG | RESPIRATION RATE: 18 BRPM

## 2024-11-19 DIAGNOSIS — R07.9 CHEST PAIN, UNSPECIFIED TYPE: Primary | ICD-10-CM

## 2024-11-19 LAB
ALBUMIN SERPL-MCNC: 4.1 G/DL (ref 3.5–5.2)
ALBUMIN/GLOB SERPL: 1.2 G/DL
ALP SERPL-CCNC: 98 U/L (ref 39–117)
ALT SERPL W P-5'-P-CCNC: 25 U/L (ref 1–33)
ANION GAP SERPL CALCULATED.3IONS-SCNC: 9.9 MMOL/L (ref 5–15)
AST SERPL-CCNC: 22 U/L (ref 1–32)
BASOPHILS # BLD AUTO: 0.03 10*3/MM3 (ref 0–0.2)
BASOPHILS NFR BLD AUTO: 0.5 % (ref 0–1.5)
BILIRUB SERPL-MCNC: 0.2 MG/DL (ref 0–1.2)
BUN SERPL-MCNC: 12 MG/DL (ref 6–20)
BUN/CREAT SERPL: 13.2 (ref 7–25)
CALCIUM SPEC-SCNC: 9.1 MG/DL (ref 8.6–10.5)
CHLORIDE SERPL-SCNC: 101 MMOL/L (ref 98–107)
CO2 SERPL-SCNC: 27.1 MMOL/L (ref 22–29)
CREAT SERPL-MCNC: 0.91 MG/DL (ref 0.57–1)
D DIMER PPP FEU-MCNC: 0.31 MCGFEU/ML (ref 0–0.5)
DEPRECATED RDW RBC AUTO: 49 FL (ref 37–54)
EGFRCR SERPLBLD CKD-EPI 2021: 78 ML/MIN/1.73
EOSINOPHIL # BLD AUTO: 0.07 10*3/MM3 (ref 0–0.4)
EOSINOPHIL NFR BLD AUTO: 1.1 % (ref 0.3–6.2)
ERYTHROCYTE [DISTWIDTH] IN BLOOD BY AUTOMATED COUNT: 13.6 % (ref 12.3–15.4)
GEN 5 2HR TROPONIN T REFLEX: <6 NG/L
GLOBULIN UR ELPH-MCNC: 3.3 GM/DL
GLUCOSE SERPL-MCNC: 141 MG/DL (ref 65–99)
HCT VFR BLD AUTO: 39.9 % (ref 34–46.6)
HGB BLD-MCNC: 12.9 G/DL (ref 12–15.9)
HOLD SPECIMEN: NORMAL
HOLD SPECIMEN: NORMAL
IMM GRANULOCYTES # BLD AUTO: 0.01 10*3/MM3 (ref 0–0.05)
IMM GRANULOCYTES NFR BLD AUTO: 0.2 % (ref 0–0.5)
LYMPHOCYTES # BLD AUTO: 0.98 10*3/MM3 (ref 0.7–3.1)
LYMPHOCYTES NFR BLD AUTO: 15.7 % (ref 19.6–45.3)
MCH RBC QN AUTO: 31.5 PG (ref 26.6–33)
MCHC RBC AUTO-ENTMCNC: 32.3 G/DL (ref 31.5–35.7)
MCV RBC AUTO: 97.3 FL (ref 79–97)
MONOCYTES # BLD AUTO: 0.48 10*3/MM3 (ref 0.1–0.9)
MONOCYTES NFR BLD AUTO: 7.7 % (ref 5–12)
NEUTROPHILS NFR BLD AUTO: 4.68 10*3/MM3 (ref 1.7–7)
NEUTROPHILS NFR BLD AUTO: 74.8 % (ref 42.7–76)
NRBC BLD AUTO-RTO: 0 /100 WBC (ref 0–0.2)
PLATELET # BLD AUTO: 227 10*3/MM3 (ref 140–450)
PMV BLD AUTO: 10 FL (ref 6–12)
POTASSIUM SERPL-SCNC: 3.7 MMOL/L (ref 3.5–5.2)
PROT SERPL-MCNC: 7.4 G/DL (ref 6–8.5)
QT INTERVAL: 378 MS
QTC INTERVAL: 454 MS
RBC # BLD AUTO: 4.1 10*6/MM3 (ref 3.77–5.28)
SODIUM SERPL-SCNC: 138 MMOL/L (ref 136–145)
TROPONIN T DELTA: NORMAL
TROPONIN T SERPL HS-MCNC: <6 NG/L
WBC NRBC COR # BLD AUTO: 6.25 10*3/MM3 (ref 3.4–10.8)
WHOLE BLOOD HOLD COAG: NORMAL
WHOLE BLOOD HOLD SPECIMEN: NORMAL

## 2024-11-19 PROCEDURE — 93005 ELECTROCARDIOGRAM TRACING: CPT

## 2024-11-19 PROCEDURE — 80053 COMPREHEN METABOLIC PANEL: CPT | Performed by: EMERGENCY MEDICINE

## 2024-11-19 PROCEDURE — 93010 ELECTROCARDIOGRAM REPORT: CPT | Performed by: STUDENT IN AN ORGANIZED HEALTH CARE EDUCATION/TRAINING PROGRAM

## 2024-11-19 PROCEDURE — 99284 EMERGENCY DEPT VISIT MOD MDM: CPT

## 2024-11-19 PROCEDURE — 93005 ELECTROCARDIOGRAM TRACING: CPT | Performed by: EMERGENCY MEDICINE

## 2024-11-19 PROCEDURE — 36415 COLL VENOUS BLD VENIPUNCTURE: CPT

## 2024-11-19 PROCEDURE — 84484 ASSAY OF TROPONIN QUANT: CPT | Performed by: EMERGENCY MEDICINE

## 2024-11-19 PROCEDURE — 85379 FIBRIN DEGRADATION QUANT: CPT | Performed by: EMERGENCY MEDICINE

## 2024-11-19 PROCEDURE — 85025 COMPLETE CBC W/AUTO DIFF WBC: CPT | Performed by: EMERGENCY MEDICINE

## 2024-11-19 PROCEDURE — 71045 X-RAY EXAM CHEST 1 VIEW: CPT

## 2024-11-19 RX ORDER — ASPIRIN 81 MG/1
324 TABLET, CHEWABLE ORAL ONCE
Status: COMPLETED | OUTPATIENT
Start: 2024-11-19 | End: 2024-11-19

## 2024-11-19 RX ORDER — SODIUM CHLORIDE 0.9 % (FLUSH) 0.9 %
10 SYRINGE (ML) INJECTION AS NEEDED
Status: DISCONTINUED | OUTPATIENT
Start: 2024-11-19 | End: 2024-11-19 | Stop reason: HOSPADM

## 2024-11-19 RX ADMIN — ASPIRIN 81 MG CHEWABLE TABLET 324 MG: 81 TABLET CHEWABLE at 08:30

## 2024-11-19 NOTE — DISCHARGE INSTRUCTIONS
Follow-up with your cardiologist call tomorrow.  Return for increasing pain neck arm jaw pain shortness of breath dizziness passing out vomiting or any other new or worse problems or concerns.

## 2024-11-19 NOTE — ED PROVIDER NOTES
Subjective   History of Present Illness  Chief complaint chest pressure    History of present illness 49-year-old female states she was driving to work and she is setting developed pressure in her chest and into her right jaw.  No shortness of breath or sweating no vomiting.  No neck arm back pain or shortness of breath.  Denies any recent injury illness flus viruses vaccinations foreign travels long car ride plane ride immobilization leg pain or swelling.  Patient has no known heart problems she states.  She normally does anything she wants without chest pain or shortness of breath.  No early family history of heart disease or clotting disorders or sudden death      Review of Systems   Constitutional:  Negative for chills, diaphoresis and fever.   Respiratory:  Positive for chest tightness. Negative for shortness of breath.    Cardiovascular:  Positive for chest pain. Negative for palpitations and leg swelling.   Gastrointestinal:  Negative for abdominal pain and vomiting.   Musculoskeletal:  Negative for back pain and neck pain.   Neurological:  Negative for dizziness, syncope and light-headedness.       Past Medical History:   Diagnosis Date    Allergic     Anxiety     Arrhythmia     Arthritis     Bipolar 1 disorder     Cancer Oct. 2023    Renal cell carcinoma    Chronic kidney disease     Depression     GERD (gastroesophageal reflux disease)     Hyperlipidemia     Hypertension     Hypothyroidism     Obesity     OCD (obsessive compulsive disorder)     RASHARD (obstructive sleep apnea)     cpap       Allergies   Allergen Reactions    Clarithromycin Irritability    Yeast Hives     HIVES AND SOME ITCHING. DOES NOT DO YEAST FREE DIET       Past Surgical History:   Procedure Laterality Date    CARDIAC CATHETERIZATION N/A 2024    Procedure: Left Heart Cath;  Surgeon: Kashif Ramírez DO;  Location: [facility] CATH INVASIVE LOCATION;  Service: Cardiovascular;  Laterality: N/A;     SECTION      x1     NEPHRECTOMY PARTIAL Left 2023    SINUS SURGERY         Family History   Problem Relation Age of Onset    Hypertension Mother     Atrial fibrillation Mother     Diabetes Mother     Arthritis Mother     Hypertension Father     Diabetes Father     Bipolar disorder Father     Kidney cancer Father     Anxiety disorder Father     Mental illness Father         Ocd    Anxiety disorder Sister     Migraines Sister     Hypertension Brother     OCD Daughter        Social History     Socioeconomic History    Marital status:     Number of children: 1   Tobacco Use    Smoking status: Never     Passive exposure: Never    Smokeless tobacco: Never   Vaping Use    Vaping status: Never Used   Substance and Sexual Activity    Alcohol use: Not Currently     Comment: Social    Drug use: Never    Sexual activity: Yes     Partners: Male     Birth control/protection: Condom     Prior to Admission medications    Medication Sig Start Date End Date Taking? Authorizing Provider   atorvastatin (LIPITOR) 20 MG tablet TAKE 1 TABLET BY MOUTH DAILY 10/14/24   Ivelisse Acevedo APRN   Cariprazine HCl (Vraylar) 1.5 MG capsule capsule Take 1 capsule by mouth Daily.    Melba Sandy MD   clomiPRAMINE (ANAFRANIL) 50 MG capsule 5 po q d 2/8/21   Salvador Connelly MD   clonazePAM (KlonoPIN) 0.5 MG tablet TK 1 T PO TID PRA 4/29/19   ProviderMelba MD   Clotrimazole 1 % ointment Apply 1 Application topically 2 (Two) Times a Day As Needed (rash). Use up to two weeks a time, then give it a break 8/30/24   Jose Zaman MD   ferrous sulfate 324 MG tablet delayed-release Take 1 tablet by mouth Every Other Day. 1/25/24   Eric Olivares MD   Fezolinetant (VEOZAH) 45 MG tablet Take 1 tablet by mouth Daily.  Patient not taking: Reported on 10/10/2024 8/30/24   Jose Zaman MD   fluticasone (FLONASE) 50 MCG/ACT nasal spray USE 2 SPRAYS IN EACH NOSTRIL DAILY AS DIRECTED BY PROVIDER 1/27/20   Salvador Connelly MD   furosemide (LASIX) 20  MG tablet TAKE 1 TABLET BY MOUTH DAILY 8/26/24   Kashif Ramírez DO   Klor-Con M20 20 MEQ CR tablet TAKE 1 TABLET BY MOUTH DAILY 11/15/24   Matt Delatorre DO   levothyroxine (SYNTHROID, LEVOTHROID) 75 MCG tablet TAKE 1 TABLET BY MOUTH DAILY 8/26/24   Ivelisse Acevedo APRN   loratadine (CLARITIN) 10 MG tablet Take 1 tablet by mouth Daily.    Provider, MD Melba   losartan (COZAAR) 100 MG tablet TAKE 1 TABLET BY MOUTH DAILY 10/28/24   Ivelisse Aceveod APRN   nebivolol (BYSTOLIC) 10 MG tablet Take 1 tablet by mouth Daily. 10/10/24   Kashif Ramírez DO   omeprazole (priLOSEC) 20 MG capsule TAKE 1 CAPSULE BY MOUTH 2 TIMES A DAY  Patient taking differently: Take 2 capsules by mouth Daily. 9/23/24   Ivelisse Acevedo APRN   OXcarbazepine (TRILEPTAL) 600 MG tablet Take 2 tablets by mouth Every Evening.    Provider, MD Melba          Objective   Physical Exam  Constitutional is a 49-year-old female awake alert no distress triage vital signs reviewed.  HEENT extraocular muscles are intact pupils equal round reactive neck supple no adenopathy no JVD no bruits.  Lungs clear no retraction no use of accessories.  Heart was regular without murmur rub.  Abdomen soft nontender good bowel sounds no peritoneal findings or pulsatile masses.  Extremities pulses equal throughout upper and lower extremities no edema no cords no Homans' sign no evidence of DVT.  Neurologic awake alert and follows commands motor strength normal without focal weak  Procedures           ED Course      Results for orders placed or performed during the hospital encounter of 11/19/24   ECG 12 Lead ED Triage Standing Order; Chest Pain    Collection Time: 11/19/24  8:14 AM   Result Value Ref Range    QT Interval 378 ms    QTC Interval 454 ms   Comprehensive Metabolic Panel    Collection Time: 11/19/24  8:29 AM    Specimen: Blood   Result Value Ref Range    Glucose 141 (H) 65 - 99 mg/dL    BUN 12 6 - 20 mg/dL     Creatinine 0.91 0.57 - 1.00 mg/dL    Sodium 138 136 - 145 mmol/L    Potassium 3.7 3.5 - 5.2 mmol/L    Chloride 101 98 - 107 mmol/L    CO2 27.1 22.0 - 29.0 mmol/L    Calcium 9.1 8.6 - 10.5 mg/dL    Total Protein 7.4 6.0 - 8.5 g/dL    Albumin 4.1 3.5 - 5.2 g/dL    ALT (SGPT) 25 1 - 33 U/L    AST (SGOT) 22 1 - 32 U/L    Alkaline Phosphatase 98 39 - 117 U/L    Total Bilirubin 0.2 0.0 - 1.2 mg/dL    Globulin 3.3 gm/dL    A/G Ratio 1.2 g/dL    BUN/Creatinine Ratio 13.2 7.0 - 25.0    Anion Gap 9.9 5.0 - 15.0 mmol/L    eGFR 78.0 >60.0 mL/min/1.73   High Sensitivity Troponin T    Collection Time: 11/19/24  8:29 AM    Specimen: Blood   Result Value Ref Range    HS Troponin T <6 <14 ng/L   CBC Auto Differential    Collection Time: 11/19/24  8:29 AM    Specimen: Blood   Result Value Ref Range    WBC 6.25 3.40 - 10.80 10*3/mm3    RBC 4.10 3.77 - 5.28 10*6/mm3    Hemoglobin 12.9 12.0 - 15.9 g/dL    Hematocrit 39.9 34.0 - 46.6 %    MCV 97.3 (H) 79.0 - 97.0 fL    MCH 31.5 26.6 - 33.0 pg    MCHC 32.3 31.5 - 35.7 g/dL    RDW 13.6 12.3 - 15.4 %    RDW-SD 49.0 37.0 - 54.0 fl    MPV 10.0 6.0 - 12.0 fL    Platelets 227 140 - 450 10*3/mm3    Neutrophil % 74.8 42.7 - 76.0 %    Lymphocyte % 15.7 (L) 19.6 - 45.3 %    Monocyte % 7.7 5.0 - 12.0 %    Eosinophil % 1.1 0.3 - 6.2 %    Basophil % 0.5 0.0 - 1.5 %    Immature Grans % 0.2 0.0 - 0.5 %    Neutrophils, Absolute 4.68 1.70 - 7.00 10*3/mm3    Lymphocytes, Absolute 0.98 0.70 - 3.10 10*3/mm3    Monocytes, Absolute 0.48 0.10 - 0.90 10*3/mm3    Eosinophils, Absolute 0.07 0.00 - 0.40 10*3/mm3    Basophils, Absolute 0.03 0.00 - 0.20 10*3/mm3    Immature Grans, Absolute 0.01 0.00 - 0.05 10*3/mm3    nRBC 0.0 0.0 - 0.2 /100 WBC   D-dimer, Quantitative    Collection Time: 11/19/24  8:29 AM    Specimen: Blood   Result Value Ref Range    D-Dimer, Quantitative 0.31 0.00 - 0.50 MCGFEU/mL   Green Top (Gel)    Collection Time: 11/19/24  8:29 AM   Result Value Ref Range    Extra Tube Hold for add-ons.     Lavender Top    Collection Time: 11/19/24  8:29 AM   Result Value Ref Range    Extra Tube hold for add-on    Gold Top - SST    Collection Time: 11/19/24  8:29 AM   Result Value Ref Range    Extra Tube Hold for add-ons.    Light Blue Top    Collection Time: 11/19/24  8:29 AM   Result Value Ref Range    Extra Tube Hold for add-ons.    High Sensitivity Troponin T 2Hr    Collection Time: 11/19/24 10:31 AM    Specimen: Blood   Result Value Ref Range    HS Troponin T <6 <14 ng/L    Troponin T Delta       No radiology results for the last day  Medications   aspirin chewable tablet 324 mg (324 mg Oral Given 11/19/24 0830)                                                EKG my interpretation normal sinus rhythm rate 87 normal axis no hypertrophy nonspecific ST changes noted throughout the inferior lateral leads but this is unchanged from previous EKG when compared to 3/4/2024 borderline EKG            Medical Decision Making  Medical decision making IV established monitor placed my review of sinus rhythm.  EKG my interpretation normal sinus rhythm rate of 87 normal axis hypertrophy nonspecific T wave changes noted throughout the inferior lateral leads unchanged from previous EKG on 3/4/2024 borderline EKG.  Patient underwent a chest x-ray my independent review without any evidence of pneumonia pneumothorax failure or other findings radiology review unremarkable.  Patient had been given aspirin p.o.  Labs obtained my independent review comprehensive metabolic profile unremarkable other than a blood sugar 141 CBC unremarkable.  First troponin was negative D-dimer within normal limits.  Repeat troponin 2 hours negative no change.  Patient repeat exam was resting comfortably talked about the findings I do not see any evidence of acute myocardial infarction DVT pulmonary embolism suggestion of aortic dissection or aneurysm pericarditis myocarditis pericardial effusion sepsis bacteremia although not a complete list of all  possibilities.  At this point patient's heart score is less than 3.  We talked about outpatient versus inpatient.  The patient wants to go home and she will follow with her primary care doctor we talked about what to return for and she was subsequently discharged home for outpatient management stable unremarkable ER course.  Repeat exam resting company no distress and no discomfort or pain.    Problems Addressed:  Chest pain, unspecified type: complicated acute illness or injury    Amount and/or Complexity of Data Reviewed  Labs: ordered. Decision-making details documented in ED Course.  Radiology: ordered and independent interpretation performed. Decision-making details documented in ED Course.  ECG/medicine tests: ordered and independent interpretation performed. Decision-making details documented in ED Course.    Risk  OTC drugs.        Final diagnoses:   Chest pain, unspecified type       ED Disposition  ED Disposition       ED Disposition   Discharge    Condition   Stable    Comment   --               Chaparro Cage DO  [address] [facility]  [address]  [place] [address]  [phone]    In 1 day           Medication List        Changed      omeprazole 20 MG capsule  Commonly known as: priLOSEC  TAKE 1 CAPSULE BY MOUTH 2 TIMES A DAY  What changed:   how much to take  when to take this                 Anival Lee MD  11/22/24 9439

## 2024-11-25 ENCOUNTER — TELEPHONE (OUTPATIENT)
Dept: CARDIOLOGY | Facility: CLINIC | Age: 49
End: 2024-11-25

## 2024-11-25 NOTE — TELEPHONE ENCOUNTER
"Caller: Olga Garcia \"Jazmin Garcia\"    Relationship to patient: Self    Best call back number: [phone]    Chief complaint: CHEST & JAW PAIN    Type of visit: FOLLOW UP    Requested date: WEDNESDAY     If rescheduling, when is the original appointment: 4/10/24     Additional notes: PT WAS HAVING CHEST PAIN AND JAW PAIN AND WENT TO THE ER. SHE SAID SHE WAS TOLD TO FOLLOW UP WITH DR. QIU.   "

## 2024-12-30 DIAGNOSIS — R60.0 EDEMA LEG: ICD-10-CM

## 2024-12-30 RX ORDER — FUROSEMIDE 20 MG/1
20 TABLET ORAL DAILY
Qty: 30 TABLET | Refills: 3 | Status: SHIPPED | OUTPATIENT
Start: 2024-12-30

## 2024-12-31 DIAGNOSIS — R60.0 EDEMA LEG: ICD-10-CM

## 2024-12-31 RX ORDER — FUROSEMIDE 20 MG/1
20 TABLET ORAL DAILY
Qty: 30 TABLET | Refills: 3 | OUTPATIENT
Start: 2024-12-31

## 2025-01-06 NOTE — Clinical Note
The left coronary artery was selectively engaged, injected and visualized. Affect appropriate/Interactive/Verbalization clear and understandable for age/Cranial nerves II-XII intact/Normal unassisted gait

## 2025-01-15 ENCOUNTER — LAB (OUTPATIENT)
Dept: FAMILY MEDICINE CLINIC | Facility: CLINIC | Age: 50
End: 2025-01-15
Payer: COMMERCIAL

## 2025-01-15 ENCOUNTER — OFFICE VISIT (OUTPATIENT)
Dept: FAMILY MEDICINE CLINIC | Facility: CLINIC | Age: 50
End: 2025-01-15
Payer: COMMERCIAL

## 2025-01-15 VITALS
OXYGEN SATURATION: 98 % | HEART RATE: 88 BPM | HEIGHT: 63 IN | SYSTOLIC BLOOD PRESSURE: 130 MMHG | BODY MASS INDEX: 49.29 KG/M2 | RESPIRATION RATE: 18 BRPM | DIASTOLIC BLOOD PRESSURE: 76 MMHG | WEIGHT: 278.2 LBS

## 2025-01-15 DIAGNOSIS — F41.9 ANXIETY: ICD-10-CM

## 2025-01-15 DIAGNOSIS — E78.2 MIXED HYPERLIPIDEMIA: ICD-10-CM

## 2025-01-15 DIAGNOSIS — R60.0 PERIPHERAL EDEMA: ICD-10-CM

## 2025-01-15 DIAGNOSIS — Z71.1 CONCERN ABOUT MEMORY: ICD-10-CM

## 2025-01-15 DIAGNOSIS — F51.5 NIGHTMARES: ICD-10-CM

## 2025-01-15 DIAGNOSIS — Z00.00 WELLNESS EXAMINATION: Primary | ICD-10-CM

## 2025-01-15 DIAGNOSIS — E03.9 ACQUIRED HYPOTHYROIDISM: ICD-10-CM

## 2025-01-15 DIAGNOSIS — R73.03 PREDIABETES: ICD-10-CM

## 2025-01-15 PROBLEM — R22.1 NECK NODULE: Status: RESOLVED | Noted: 2021-02-08 | Resolved: 2025-01-15

## 2025-01-15 PROBLEM — H53.9 VISUAL DISTURBANCES: Status: RESOLVED | Noted: 2020-05-08 | Resolved: 2025-01-15

## 2025-01-15 PROBLEM — R00.2 PALPITATIONS: Status: RESOLVED | Noted: 2021-11-03 | Resolved: 2025-01-15

## 2025-01-15 LAB
ANION GAP SERPL CALCULATED.3IONS-SCNC: 9 MMOL/L (ref 5–15)
BUN SERPL-MCNC: 12 MG/DL (ref 6–20)
BUN/CREAT SERPL: 13 (ref 7–25)
CALCIUM SPEC-SCNC: 9 MG/DL (ref 8.6–10.5)
CHLORIDE SERPL-SCNC: 101 MMOL/L (ref 98–107)
CHOLEST SERPL-MCNC: 167 MG/DL (ref 0–200)
CO2 SERPL-SCNC: 28 MMOL/L (ref 22–29)
CREAT SERPL-MCNC: 0.92 MG/DL (ref 0.57–1)
EGFRCR SERPLBLD CKD-EPI 2021: 77 ML/MIN/1.73
GLUCOSE SERPL-MCNC: 98 MG/DL (ref 65–99)
HBA1C MFR BLD: 5.6 % (ref 4.8–5.6)
HDLC SERPL-MCNC: 46 MG/DL (ref 40–60)
LDLC SERPL CALC-MCNC: 101 MG/DL (ref 0–100)
LDLC/HDLC SERPL: 2.15 {RATIO}
POTASSIUM SERPL-SCNC: 3.9 MMOL/L (ref 3.5–5.2)
SODIUM SERPL-SCNC: 138 MMOL/L (ref 136–145)
TRIGL SERPL-MCNC: 110 MG/DL (ref 0–150)
TSH SERPL DL<=0.05 MIU/L-ACNC: 1.23 UIU/ML (ref 0.27–4.2)
VLDLC SERPL-MCNC: 20 MG/DL (ref 5–40)

## 2025-01-15 PROCEDURE — 80048 BASIC METABOLIC PNL TOTAL CA: CPT | Performed by: STUDENT IN AN ORGANIZED HEALTH CARE EDUCATION/TRAINING PROGRAM

## 2025-01-15 PROCEDURE — 83036 HEMOGLOBIN GLYCOSYLATED A1C: CPT | Performed by: STUDENT IN AN ORGANIZED HEALTH CARE EDUCATION/TRAINING PROGRAM

## 2025-01-15 PROCEDURE — 84443 ASSAY THYROID STIM HORMONE: CPT | Performed by: STUDENT IN AN ORGANIZED HEALTH CARE EDUCATION/TRAINING PROGRAM

## 2025-01-15 PROCEDURE — 99396 PREV VISIT EST AGE 40-64: CPT | Performed by: STUDENT IN AN ORGANIZED HEALTH CARE EDUCATION/TRAINING PROGRAM

## 2025-01-15 PROCEDURE — 36415 COLL VENOUS BLD VENIPUNCTURE: CPT | Performed by: STUDENT IN AN ORGANIZED HEALTH CARE EDUCATION/TRAINING PROGRAM

## 2025-01-15 PROCEDURE — 80061 LIPID PANEL: CPT | Performed by: STUDENT IN AN ORGANIZED HEALTH CARE EDUCATION/TRAINING PROGRAM

## 2025-01-15 PROCEDURE — 99214 OFFICE O/P EST MOD 30 MIN: CPT | Performed by: STUDENT IN AN ORGANIZED HEALTH CARE EDUCATION/TRAINING PROGRAM

## 2025-01-15 NOTE — PROGRESS NOTES
"Chief Complaint  Chief Complaint   Patient presents with    Annual Exam       Subjective        Olga Garcia is a 49 y.o. female who presents to [facility].  History of Present Illness    Jazmin is a 49 year old female here for wellness and other concerns.      Wellness  Smoking: denies  ETOH: socially  Drug use: denies      Bilateral LE edema  Chronic but worse over the last 2-3 days  Has been on lasix 20 mg daily for the last ~ year  Denies any recent change in diet      Memory  States she has had gradually worsening memory issues since having her daughter 17 years ago  Frequently forgets conversations she has had or where she places things  Has talked with her psychiatrist about it and they do not think it is related to her chronic clonazepam use    Night terrors  Over the last few months has been having nightmares almost every night  States that her  tells her she frequently acts out her dreams but she has no memory of this  Frequently wakes up in a panic      Anxiety  Recently her cardiologist decrease the dose of her Vraylar due to concerns with QT prolongation  Since that time anxiety has been less controlled than usual  Would like to be able to come off of her clonazepam due to concerns of memory issues but does not feel it is possible at this time due to uncontrolled anxiety  Follows with psychiatry        Objective   /76   Pulse 88   Resp 18   Ht 160 cm (63\")   Wt 126 kg (278 lb 3.2 oz)   SpO2 98%   BMI 49.28 kg/m²     Estimated body mass index is 49.28 kg/m² as calculated from the following:    Height as of this encounter: 160 cm (63\").    Weight as of this encounter: 126 kg (278 lb 3.2 oz).     Physical Exam   GEN: In no acute distress, non toxic appearing  HEENT: EOMI. Mucous membranes moist. Oropharynx without erythema or exudate.   CV: Regular rate and rhythm, no murmurs.  2+ pitting edema in bilateral lower extremities, up to mid shin.  RESP: " Lungs clear to auscultation bilaterally.  No signs of respiratory distress on room air.  SKIN: No rashes  MSK: No joint erythema, deformity, or effusion.   NEURO: Alert and appropriate. CN 2-12 intact grossly.       PHQ-2 Depression Screening  Little interest or pleasure in doing things? Not at all   Feeling down, depressed, or hopeless? Not at all   PHQ-2 Total Score 0         Result Review :              Assessment and Plan     Diagnoses and all orders for this visit:    1. Wellness examination (Primary)    Encourage healthy lifestyle choices such as healthy diet choices (low carbohydrates, increase fruits/vegetables, less processed foods, limiting portion sizes) as well as increasing physical activity with goal of 150 minutes of moderate intensity exercise per week.    Routine screening labs ordered    Screening  - CRC: Due, would like to get colonoscopy but wants to wait until the summer.  Will call in the late spring to have this order placed.  - Pap: Up-to-date    2. Peripheral edema  Chronic, worse over the last 2-3 days  Obtaining labs to rule out renal dysfunction    Most likely related to potential changes in diet, physical activity level  Recommend elevating legs when able throughout the day and overnight as well as compression stockings  Continue Lasix 20 mg once daily    -     Basic Metabolic Panel    3. Prediabetes  Update A1c today    -     Basic Metabolic Panel  -     Hemoglobin A1c    4. Mixed hyperlipidemia  Update lipid panel today    Continue atorvastatin 20 mg once daily  -     Lipid Panel    5. Acquired hypothyroidism  Update TSH today  Continue levothyroxine 75 mcg once daily    -     TSH    6. Anxiety  Chronic, uncontrolled  Previously in therapy but has not been recently -strongly recommend that she consider restarting therapy as this will give her the best chance of being able to come off of medications in the future.    She does want to come off of the clonazepam if possible at some point  in the future.  Unfortunately given the concerns of QT prolongation would not be a great candidate for hydroxyzine and replacement of this.    Continue follow-up with psychiatry    7. Nightmares  Most likely related to uncontrolled anxiety  Did recommend that she talk with her psychiatrist about prazosin to see if this medication may be helpful    8. Concern about memory  Has been an issue for ~17 years but gradually getting worse  Most likely related to chronic benzodiazepine use  Will refer to neurology for further evaluation and recommendations    -     Ambulatory Referral to Neurology            Follow Up           Return in about 6 months (around 7/15/2025) for Recheck.

## 2025-01-15 NOTE — TELEPHONE ENCOUNTER
"    Caller: Olga Garcia \"Jazmin Garcia\"    Relationship: Self    Best call back number: [phone]    Requested Prescriptions:   Requested Prescriptions     Pending Prescriptions Disp Refills    Clotrimazole 1 % ointment 56.7 g 0     Sig: Apply 1 Application topically 2 (Two) Times a Day As Needed (rash). Use up to two weeks a time, then give it a break        Pharmacy where request should be sent: [facility] [id number] [place] [address] - [phone] [facility] [phone] FX     Last office visit with prescribing clinician: 1/15/2025   Last telemedicine visit with prescribing clinician: Visit date not found   Next office visit with prescribing clinician: 7/16/2025     Additional details provided by patient: IS OUT     Does the patient have less than a 3 day supply:  [x] Yes  [] No    Would you like a call back once the refill request has been completed: [] Yes [x] No    If the office needs to give you a call back, can they leave a voicemail: [] Yes [x] No    Leslee Martinez   01/15/25 09:00 EST         "

## 2025-01-16 ENCOUNTER — TELEPHONE (OUTPATIENT)
Dept: CARDIOLOGY | Facility: CLINIC | Age: 50
End: 2025-01-16

## 2025-01-16 NOTE — TELEPHONE ENCOUNTER
"Caller: Olga Garcia \"Jazmin Garcia\"     Relationship: SELF    Best call back number: [phone]    What is your medical concern? ADEMA SWELLING IN FEET AND LEGS, SORE, TENDERNESS.     How long has this issue been going on? 01.12.25    Is your provider already aware of this issue? YES    Have you been treated for this issue? PATIENT ON FUROSEMIDE PATIENT WANTED TO KNOW IF SHE NEEDED TO ADJUST MEDICATION OR WHAT DR QIU WOULD LIKE HER TO TRY TO HELP WITH SWELLING. PLEASE ADVISE.    "

## 2025-01-17 ENCOUNTER — TELEPHONE (OUTPATIENT)
Dept: CARDIOLOGY | Facility: CLINIC | Age: 50
End: 2025-01-17

## 2025-01-17 NOTE — TELEPHONE ENCOUNTER
Caller: BRANDON    Relationship: SELF    Best call back number: [phone]         What was the call regarding: CONTINUATION OF PATIENTS MY CHART MESSAGE: SHE IS ALSO TAKING POTASSIUM AND WOULD LIKE TO KNOW IF SHE SHOULD CHANGE THAT DOSE AS WELL-  PT WILL BE SENDING THE EXACT DOSE SHE TAKES WHEN SHE GETS HOME.

## 2025-01-20 ENCOUNTER — TELEPHONE (OUTPATIENT)
Dept: CARDIOLOGY | Facility: CLINIC | Age: 50
End: 2025-01-20

## 2025-01-20 NOTE — TELEPHONE ENCOUNTER
"    Caller: Olga Garcia \"Jazmin Garcia\"     Relationship: SELF    Best call back number: [phone]    What is your medical concern? STILL HAVING EDEMA ISSUES.     How long has this issue been going on? 3 DAYS     Is your provider already aware of this issue? YES    Have you been treated for this issue? YES SEE ENCOUNTER FROM 1/17      "

## 2025-01-20 NOTE — PROGRESS NOTES
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     Patient has past cardiac history of nonsustained ventricular tachycardia per cardiac monitor prompting cath 3/2024 showing normal coronaries.  LV gram shows EF 60%.  Last 2D echo in 2020 shows an EF of 65% with mild MR/TR.  PMH includes HTN, HLD, prediabetic, RASHARD, hypothyroidism, and right renal mass s/p partial nephrectomy last year.    Patient contacted the office with complaints of lower extremity edema over the past week and was instructed to double her lasix for three days.  She tells me that this helped initially but worsened thereafter.  She denies shortness of breath, PND/orthopnea, abdominal distention, or unusual weight gain.  She admits that she does not restrict her sodium intake.  She reports that she is typically active and does not usually have difficulty with ambulation.    ASSESSMENT/PLAN:      Diagnoses and all orders for this visit:    1. Bilateral leg edema (Primary)  -     Basic Metabolic Panel; Future  -     Adult Transthoracic Echo Complete W/ Cont if Necessary Per Protocol; Future  -     BNP; Future    2. Edema leg  -     furosemide (LASIX) 40 MG tablet; Take 1 tablet by mouth Daily.  Dispense: 30 tablet; Refill: 2            MEDICAL DECISION MAKING:    Patient has significant residual pitting edema BLE.  Will continue on Lasix at 40 mg p.o. daily and potassium at 20 mill equivalents p.o. daily with repeat BMP and BNP in several days.  Will proceed with repeat 2D echo.  Patient has been counseled on daily sodium/fluid restriction.      RTC in 4 weeks.    Past Medical History:   Diagnosis Date    Allergic     Anxiety     Arrhythmia     Arthritis     Bipolar 1 disorder     Cancer Oct. 2023    Renal cell carcinoma    Chronic kidney disease      Depression     GERD (gastroesophageal reflux disease)     Hyperlipidemia     Hypertension     Hypothyroidism     Obesity     OCD (obsessive compulsive disorder)     RASHARD (obstructive sleep apnea)     cpap    Ventricular tachycardia        Past Surgical History:   Procedure Laterality Date    CARDIAC CATHETERIZATION N/A 2024    Procedure: Left Heart Cath;  Surgeon: Kashif Ramírez DO;  Location: [facility] CATH INVASIVE LOCATION;  Service: Cardiovascular;  Laterality: N/A;     SECTION      x1    NEPHRECTOMY PARTIAL Left     SINUS SURGERY           Current Outpatient Medications:     atorvastatin (LIPITOR) 20 MG tablet, TAKE 1 TABLET BY MOUTH DAILY, Disp: 30 tablet, Rfl: 3    Cariprazine HCl (Vraylar) 1.5 MG capsule capsule, Take 1 capsule by mouth Daily., Disp: , Rfl:     clomiPRAMINE (ANAFRANIL) 50 MG capsule, 5 po q d, Disp: 150 capsule, Rfl: 6    clonazePAM (KlonoPIN) 0.5 MG tablet, TK 1 T PO TID PRA, Disp: , Rfl: 5    Clotrimazole 1 % ointment, Apply 1 Application topically 2 (Two) Times a Day As Needed (rash). Use up to two weeks a time, then give it a break, Disp: 56.7 g, Rfl: 0    ferrous sulfate 324 MG tablet delayed-release, Take 1 tablet by mouth Every Other Day., Disp: 60 tablet, Rfl: 3    fluticasone (FLONASE) 50 MCG/ACT nasal spray, USE 2 SPRAYS IN EACH NOSTRIL DAILY AS DIRECTED BY PROVIDER, Disp: 48 g, Rfl: 1    furosemide (LASIX) 40 MG tablet, Take 1 tablet by mouth Daily., Disp: 30 tablet, Rfl: 2    Klor-Con M20 20 MEQ CR tablet, TAKE 1 TABLET BY MOUTH DAILY, Disp: 30 tablet, Rfl: 3    levothyroxine (SYNTHROID, LEVOTHROID) 75 MCG tablet, TAKE 1 TABLET BY MOUTH DAILY, Disp: 90 tablet, Rfl: 1    losartan (COZAAR) 100 MG tablet, TAKE 1 TABLET BY MOUTH DAILY, Disp: 90 tablet, Rfl: 0    nebivolol (BYSTOLIC) 10 MG tablet, Take 1 tablet by mouth Daily., Disp: 90 tablet, Rfl: 3    omeprazole (priLOSEC) 20 MG capsule, TAKE 1 CAPSULE BY MOUTH 2 TIMES A DAY (Patient taking  "differently: Take 2 capsules by mouth Daily.), Disp: 180 capsule, Rfl: 1    OXcarbazepine (TRILEPTAL) 600 MG tablet, Take 2 tablets by mouth Every Evening., Disp: , Rfl:     Social History     Socioeconomic History    Marital status:     Number of children: 1   Tobacco Use    Smoking status: Never     Passive exposure: Never    Smokeless tobacco: Never   Vaping Use    Vaping status: Never Used   Substance and Sexual Activity    Alcohol use: Not Currently     Comment: Social    Drug use: Never    Sexual activity: Yes     Partners: Male     Birth control/protection: Condom       Family History   Problem Relation Age of Onset    Hypertension Mother     Atrial fibrillation Mother     Diabetes Mother     Arthritis Mother     Hypertension Father     Diabetes Father     Bipolar disorder Father     Kidney cancer Father     Anxiety disorder Father     Mental illness Father         Ocd    Anxiety disorder Sister     Migraines Sister     Hypertension Brother     OCD Daughter        The following portions of the patient's history were reviewed and updated as appropriate: allergies, current medications, past family history, past medical history, past social history, past surgical history and problem list.    ROS  /81   Pulse 74   Ht 160 cm (63\")   Wt 126 kg (277 lb)   SpO2 98%   BMI 49.07 kg/m² .  Objective     Physical Exam    Physical Exam:  Neuro:  CV:  Resp:  GI:  Ext:  Pysch: AAOx3, no gross deficits  S1S2 RRR, no murmur  Non-labored, CTA  BS+, abd soft  Pedal pulses palp, 2+ pitting BLE up to shins  Calm and cooperative       Procedures           "

## 2025-01-21 ENCOUNTER — OFFICE VISIT (OUTPATIENT)
Dept: CARDIOLOGY | Facility: CLINIC | Age: 50
End: 2025-01-21
Payer: COMMERCIAL

## 2025-01-21 VITALS
SYSTOLIC BLOOD PRESSURE: 119 MMHG | DIASTOLIC BLOOD PRESSURE: 81 MMHG | WEIGHT: 277 LBS | HEIGHT: 63 IN | HEART RATE: 74 BPM | OXYGEN SATURATION: 98 % | BODY MASS INDEX: 49.08 KG/M2

## 2025-01-21 DIAGNOSIS — R60.0 BILATERAL LEG EDEMA: Primary | ICD-10-CM

## 2025-01-21 DIAGNOSIS — R60.0 EDEMA LEG: ICD-10-CM

## 2025-01-21 PROCEDURE — 99214 OFFICE O/P EST MOD 30 MIN: CPT | Performed by: NURSE PRACTITIONER

## 2025-01-21 RX ORDER — FUROSEMIDE 40 MG/1
40 TABLET ORAL DAILY
Qty: 30 TABLET | Refills: 2 | Status: SHIPPED | OUTPATIENT
Start: 2025-01-21

## 2025-01-22 ENCOUNTER — TELEPHONE (OUTPATIENT)
Dept: FAMILY MEDICINE CLINIC | Facility: CLINIC | Age: 50
End: 2025-01-22
Payer: COMMERCIAL

## 2025-01-22 ENCOUNTER — HOSPITAL ENCOUNTER (OUTPATIENT)
Dept: CARDIOLOGY | Facility: HOSPITAL | Age: 50
Discharge: HOME | End: 2025-01-22
Admitting: NURSE PRACTITIONER
Payer: COMMERCIAL

## 2025-01-22 VITALS
DIASTOLIC BLOOD PRESSURE: 74 MMHG | HEART RATE: 80 BPM | WEIGHT: 277 LBS | SYSTOLIC BLOOD PRESSURE: 138 MMHG | BODY MASS INDEX: 49.08 KG/M2 | HEIGHT: 63 IN

## 2025-01-22 DIAGNOSIS — R60.0 BILATERAL LEG EDEMA: ICD-10-CM

## 2025-01-22 PROCEDURE — 93356 MYOCRD STRAIN IMG SPCKL TRCK: CPT | Performed by: INTERNAL MEDICINE

## 2025-01-22 PROCEDURE — 93306 TTE W/DOPPLER COMPLETE: CPT

## 2025-01-22 PROCEDURE — 93356 MYOCRD STRAIN IMG SPCKL TRCK: CPT

## 2025-01-22 PROCEDURE — 93306 TTE W/DOPPLER COMPLETE: CPT | Performed by: INTERNAL MEDICINE

## 2025-01-22 NOTE — TELEPHONE ENCOUNTER
"    Caller: Olga Garcia \"Jazmin Garcia\"    Relationship to patient: Self    Best call back number:   Telephone Information:   Mobile [phone]         Patient is needing: PATIENT CALLED AND STATED THAT Clotrimazole 1 % ointment REQUIRES A PRIOR AUTHORIZATION. PLEASE CALL PATIENT ONCE PRIOR AUTH SENT TO INSURANCE          "

## 2025-01-23 ENCOUNTER — LAB (OUTPATIENT)
Dept: LAB | Facility: HOSPITAL | Age: 50
End: 2025-01-23
Payer: COMMERCIAL

## 2025-01-23 DIAGNOSIS — R60.0 BILATERAL LEG EDEMA: ICD-10-CM

## 2025-01-23 LAB
ANION GAP SERPL CALCULATED.3IONS-SCNC: 10.3 MMOL/L (ref 5–15)
AORTIC DIMENSIONLESS INDEX: 0.82 (DI)
AV MEAN PRESS GRAD SYS DOP V1V2: 7.7 MMHG
AV VMAX SYS DOP: 183.9 CM/SEC
BH CV ECHO LEFT VENTRICLE GLOBAL LONGITUDINAL STRAIN: -18.5 %
BH CV ECHO MEAS - ACS: 1.82 CM
BH CV ECHO MEAS - AO MAX PG: 13.5 MMHG
BH CV ECHO MEAS - AO ROOT DIAM: 2.9 CM
BH CV ECHO MEAS - AO V2 VTI: 39.1 CM
BH CV ECHO MEAS - AVA(I,D): 2.7 CM2
BH CV ECHO MEAS - EDV(CUBED): 179.5 ML
BH CV ECHO MEAS - EDV(MOD-SP2): 152.1 ML
BH CV ECHO MEAS - EDV(MOD-SP4): 146.6 ML
BH CV ECHO MEAS - EF(MOD-SP2): 62.4 %
BH CV ECHO MEAS - EF(MOD-SP4): 68.2 %
BH CV ECHO MEAS - ESV(CUBED): 41.8 ML
BH CV ECHO MEAS - ESV(MOD-SP2): 57.1 ML
BH CV ECHO MEAS - ESV(MOD-SP4): 46.7 ML
BH CV ECHO MEAS - FS: 38.5 %
BH CV ECHO MEAS - IVS/LVPW: 0.91 CM
BH CV ECHO MEAS - IVSD: 0.96 CM
BH CV ECHO MEAS - LA A2CS (ATRIAL LENGTH): 5.7 CM
BH CV ECHO MEAS - LA A4C LENGTH: 5.5 CM
BH CV ECHO MEAS - LA DIMENSION: 4.6 CM
BH CV ECHO MEAS - LAT PEAK E' VEL: 9.7 CM/SEC
BH CV ECHO MEAS - LV DIASTOLIC VOL/BSA (35-75): 66.7 CM2
BH CV ECHO MEAS - LV MASS(C)D: 225 GRAMS
BH CV ECHO MEAS - LV MAX PG: 7.7 MMHG
BH CV ECHO MEAS - LV MEAN PG: 4.4 MMHG
BH CV ECHO MEAS - LV SYSTOLIC VOL/BSA (12-30): 21.2 CM2
BH CV ECHO MEAS - LV V1 MAX: 139 CM/SEC
BH CV ECHO MEAS - LV V1 VTI: 32.2 CM
BH CV ECHO MEAS - LVIDD: 5.6 CM
BH CV ECHO MEAS - LVIDS: 3.5 CM
BH CV ECHO MEAS - LVOT AREA: 3.2 CM2
BH CV ECHO MEAS - LVOT DIAM: 2.03 CM
BH CV ECHO MEAS - LVPWD: 1.06 CM
BH CV ECHO MEAS - MED PEAK E' VEL: 9.6 CM/SEC
BH CV ECHO MEAS - MR MAX PG: 93.4 MMHG
BH CV ECHO MEAS - MR MAX VEL: 479.5 CM/SEC
BH CV ECHO MEAS - MV A MAX VEL: 65.7 CM/SEC
BH CV ECHO MEAS - MV DEC SLOPE: 600.9 CM/SEC2
BH CV ECHO MEAS - MV DEC TIME: 0.18 SEC
BH CV ECHO MEAS - MV E MAX VEL: 105.3 CM/SEC
BH CV ECHO MEAS - MV E/A: 1.6
BH CV ECHO MEAS - MV MAX PG: 6.3 MMHG
BH CV ECHO MEAS - MV MEAN PG: 1.88 MMHG
BH CV ECHO MEAS - MV V2 VTI: 22.7 CM
BH CV ECHO MEAS - MVA(VTI): 4.6 CM2
BH CV ECHO MEAS - PA ACC TIME: 0.13 SEC
BH CV ECHO MEAS - PA V2 MAX: 111.5 CM/SEC
BH CV ECHO MEAS - PULM A REVS DUR: 0.11 SEC
BH CV ECHO MEAS - PULM A REVS VEL: 25.9 CM/SEC
BH CV ECHO MEAS - PULM DIAS VEL: 68.2 CM/SEC
BH CV ECHO MEAS - PULM S/D: 0.91
BH CV ECHO MEAS - PULM SYS VEL: 61.8 CM/SEC
BH CV ECHO MEAS - QP/QS: 1.62
BH CV ECHO MEAS - RAP SYSTOLE: 3 MMHG
BH CV ECHO MEAS - RV MAX PG: 4.3 MMHG
BH CV ECHO MEAS - RV V1 MAX: 103.2 CM/SEC
BH CV ECHO MEAS - RV V1 VTI: 24.8 CM
BH CV ECHO MEAS - RVDD: 3.1 CM
BH CV ECHO MEAS - RVOT DIAM: 2.9 CM
BH CV ECHO MEAS - RVSP: 44 MMHG
BH CV ECHO MEAS - SV(LVOT): 103.7 ML
BH CV ECHO MEAS - SV(MOD-SP2): 95 ML
BH CV ECHO MEAS - SV(MOD-SP4): 99.9 ML
BH CV ECHO MEAS - SV(RVOT): 167.5 ML
BH CV ECHO MEAS - SVI(LVOT): 47.2 ML/M2
BH CV ECHO MEAS - SVI(MOD-SP2): 43.2 ML/M2
BH CV ECHO MEAS - SVI(MOD-SP4): 45.5 ML/M2
BH CV ECHO MEAS - TAPSE (>1.6): 2.5 CM
BH CV ECHO MEAS - TR MAX PG: 31.4 MMHG
BH CV ECHO MEAS - TR MAX VEL: 275.6 CM/SEC
BH CV ECHO MEASUREMENTS AVERAGE E/E' RATIO: 10.91
BH CV XLRA - RV BASE: 3.5 CM
BH CV XLRA - RV MID: 2.8 CM
BH CV XLRA - TDI S': 15 CM/SEC
BUN SERPL-MCNC: 15 MG/DL (ref 6–20)
BUN/CREAT SERPL: 14.6 (ref 7–25)
CALCIUM SPEC-SCNC: 9.7 MG/DL (ref 8.6–10.5)
CHLORIDE SERPL-SCNC: 97 MMOL/L (ref 98–107)
CO2 SERPL-SCNC: 27.7 MMOL/L (ref 22–29)
CREAT SERPL-MCNC: 1.03 MG/DL (ref 0.57–1)
EGFRCR SERPLBLD CKD-EPI 2021: 67.2 ML/MIN/1.73
GLUCOSE SERPL-MCNC: 86 MG/DL (ref 65–99)
LEFT ATRIUM VOLUME INDEX: 31.2 ML/M2
LEFT ATRIUM VOLUME: 66 ML
LV EF BIPLANE MOD: 66 %
NT-PROBNP SERPL-MCNC: 199 PG/ML (ref 0–450)
POTASSIUM SERPL-SCNC: 4.1 MMOL/L (ref 3.5–5.2)
SODIUM SERPL-SCNC: 135 MMOL/L (ref 136–145)

## 2025-01-23 PROCEDURE — 36415 COLL VENOUS BLD VENIPUNCTURE: CPT

## 2025-01-23 PROCEDURE — 80048 BASIC METABOLIC PNL TOTAL CA: CPT

## 2025-01-23 PROCEDURE — 83880 ASSAY OF NATRIURETIC PEPTIDE: CPT

## 2025-01-23 RX ORDER — CLOTRIMAZOLE 1 %
1 CREAM (GRAM) TOPICAL 2 TIMES DAILY
Qty: 60 G | Refills: 0 | Status: SHIPPED | OUTPATIENT
Start: 2025-01-23

## 2025-01-23 NOTE — TELEPHONE ENCOUNTER
Will send asap once we get denial.  This is OTC and can use goodrx if need be. (Lotrimin generic)    I also sent back to see if they will run as cream.

## 2025-01-23 NOTE — TELEPHONE ENCOUNTER
"Caller: Olga Garcia \"Jazmin Garcia\"    Relationship to patient: Self    Best call back number: [phone]     Patient is needing: PT IS REQUESTING ECHO RESULTS, PLS CALL AND ADVISE.           "

## 2025-01-24 ENCOUNTER — TELEPHONE (OUTPATIENT)
Dept: CARDIOLOGY | Facility: CLINIC | Age: 50
End: 2025-01-24
Payer: COMMERCIAL

## 2025-01-24 DIAGNOSIS — I50.33 ACUTE ON CHRONIC DIASTOLIC CHF (CONGESTIVE HEART FAILURE): Primary | ICD-10-CM

## 2025-01-24 NOTE — TELEPHONE ENCOUNTER
Patient reports improving lower extremity edema.  Will continue diuretics/K at current dose.  Repeat BMP and BNP in one week.

## 2025-01-27 NOTE — TELEPHONE ENCOUNTER
"Caller: Olga Garcia \"Jazmin Garcia\"    Relationship to patient: Self    Best call back number: [phone]     Chief complaint: EDEMA     Type of visit: FU    Requested date: ANYTIME- ASAP     If rescheduling, when is the original appointment: 4.10.25     PT HAS SOME QUESTIONS REGARDING HER TEST RESULTS- PT ALSO HAS SOME EDEMA AND WANTS DR QIU TO TAKE A LOOK. SHE IS REQUESTING AN APPT WITH DR QIU IN REGARDS TO THIS. PT DECLINED ACTIVE SYMPTOMS. PLS CALL AND ADVISE ON SCHEDULING.         "

## 2025-01-28 ENCOUNTER — TELEPHONE (OUTPATIENT)
Dept: CARDIOLOGY | Facility: CLINIC | Age: 50
End: 2025-01-28
Payer: COMMERCIAL

## 2025-01-28 ENCOUNTER — LAB (OUTPATIENT)
Dept: LAB | Facility: HOSPITAL | Age: 50
End: 2025-01-28
Payer: COMMERCIAL

## 2025-01-28 DIAGNOSIS — I50.33 ACUTE ON CHRONIC DIASTOLIC CHF (CONGESTIVE HEART FAILURE): ICD-10-CM

## 2025-01-28 LAB
ANION GAP SERPL CALCULATED.3IONS-SCNC: 10.4 MMOL/L (ref 5–15)
BUN SERPL-MCNC: 15 MG/DL (ref 6–20)
BUN/CREAT SERPL: 15 (ref 7–25)
CALCIUM SPEC-SCNC: 9.7 MG/DL (ref 8.6–10.5)
CHLORIDE SERPL-SCNC: 93 MMOL/L (ref 98–107)
CO2 SERPL-SCNC: 27.6 MMOL/L (ref 22–29)
CREAT SERPL-MCNC: 1 MG/DL (ref 0.57–1)
EGFRCR SERPLBLD CKD-EPI 2021: 69.6 ML/MIN/1.73
GLUCOSE SERPL-MCNC: 91 MG/DL (ref 65–99)
NT-PROBNP SERPL-MCNC: 62.5 PG/ML (ref 0–450)
POTASSIUM SERPL-SCNC: 4.3 MMOL/L (ref 3.5–5.2)
SODIUM SERPL-SCNC: 131 MMOL/L (ref 136–145)

## 2025-01-28 PROCEDURE — 80048 BASIC METABOLIC PNL TOTAL CA: CPT

## 2025-01-28 PROCEDURE — 83880 ASSAY OF NATRIURETIC PEPTIDE: CPT

## 2025-01-28 PROCEDURE — 36415 COLL VENOUS BLD VENIPUNCTURE: CPT

## 2025-01-28 NOTE — TELEPHONE ENCOUNTER
I am not sure when the next appt with Dr Ramírez is. Will see if we can get her in this week. Will remind her to get the labs Lulú ordered done

## 2025-01-28 NOTE — TELEPHONE ENCOUNTER
"    Caller: Olga Garcia \"Jazmin Garcia\"    Relationship to patient: Self    Best call back number: [phone]    Patient is needing: PATIENT IS SCHEDULED WITH [facility] ON 2.18.25 . PATIENT WAS CALLED THAT SHE HAS VENTRICULAR TACHYCARDIA AND DIASTOLIC DYSFUNCTION AND SHE IS REQUESTING TO SEE DR QIU INSTEAD AND SHE IS OK GOING TO ANY OFFICE. SHE'S HAVING SOME ANXIETY ABOUT SEEING SOMEONE ELSE.       SHE ALSO WANTS TO KNOW HOW SHE'S SUPPOSE TO INCREASE HER FUROSEMIDE 20MG TO 40MG . PATIENTS CREANTINE WAS FINE AT 20 MG  BUT HAS  ELEVATED WHEN SHE WAS MOVED UP TO 40 MG.         "

## 2025-01-30 ENCOUNTER — OFFICE VISIT (OUTPATIENT)
Dept: CARDIOLOGY | Facility: CLINIC | Age: 50
End: 2025-01-30
Payer: COMMERCIAL

## 2025-01-30 VITALS
OXYGEN SATURATION: 99 % | HEART RATE: 65 BPM | DIASTOLIC BLOOD PRESSURE: 74 MMHG | BODY MASS INDEX: 48.2 KG/M2 | HEIGHT: 63 IN | WEIGHT: 272 LBS | SYSTOLIC BLOOD PRESSURE: 109 MMHG

## 2025-01-30 DIAGNOSIS — E78.2 MIXED HYPERLIPIDEMIA: ICD-10-CM

## 2025-01-30 DIAGNOSIS — E66.813 CLASS 3 SEVERE OBESITY DUE TO EXCESS CALORIES WITH SERIOUS COMORBIDITY AND BODY MASS INDEX (BMI) OF 45.0 TO 49.9 IN ADULT: ICD-10-CM

## 2025-01-30 DIAGNOSIS — E66.01 CLASS 3 SEVERE OBESITY DUE TO EXCESS CALORIES WITH SERIOUS COMORBIDITY AND BODY MASS INDEX (BMI) OF 45.0 TO 49.9 IN ADULT: ICD-10-CM

## 2025-01-30 DIAGNOSIS — F41.9 ANXIETY: ICD-10-CM

## 2025-01-30 DIAGNOSIS — I10 PRIMARY HYPERTENSION: Primary | ICD-10-CM

## 2025-01-30 DIAGNOSIS — G47.33 OBSTRUCTIVE SLEEP APNEA: ICD-10-CM

## 2025-01-30 DIAGNOSIS — R60.0 PERIPHERAL EDEMA: ICD-10-CM

## 2025-01-30 PROCEDURE — 99214 OFFICE O/P EST MOD 30 MIN: CPT | Performed by: INTERNAL MEDICINE

## 2025-01-30 NOTE — PROGRESS NOTES
Cardiology Office Visit      Encounter Date:  01/30/2025    Patient ID:   Olga Garcia is a 49 y.o. female.    Reason For Followup:  Palpitations    Brief Clinical History:  Dear Dr. Cage, Chaparro Lopes, DO    I had the pleasure of seeing Olga Garcia today. As you are well aware, this is a 49 y.o. female with no established history of ischemic heart disease.  She does have a history of palpitations, hypothyroidism, hypertension, obstructive sleep apnea, anxiety, and hyperlipidemia.  She presents today for follow-up on the above conditions.    Interval History:  She denies any chest pain pressure heaviness or tightness.  She denies any shortness of breath out of character.  She denies any PND orthopnea.  She denies any syncope or near syncope.  Other than some occasional palpitations, she reports feeling well.    Her blood pressure issues have completely resolved and she is normotensive today in the office.  She is tolerating her antihypertensive regimen quite nicely.    She does report that she was found to have a kidney mass.  She is going to have surgery by Dr. Cates to have this removed.    Since she is doing quite well from a cardiac and blood pressure perspective, I have given her the option to follow-up as needed and to continue to follow-up with you.    01/30/2024    She had some chest pressure last week. Felt like a small animal was on her chest. Went to ER and potassium was low. She got potassium and magnesium. Still having the pressure whe she sits on couch when she is doing school stuff. Will last a couple seconds and goes away. This feels distinctly different than her palpitations. Will do 2 week MCOT.    02/29/2024        As a result of her above symptoms she underwent a 2-week mobile cardiac telemetry.  Her study was abnormal with 1 episode of ventricular tachycardia that lasted 5 beats with a peak rate of 146 bpm.  She was symptomatic with this episode.    We do lengthy discussion regarding  ventricular tachycardia and its potential causative etiologies.  We discussed risk benefits and options including cardiac catheterization.  She wishes to pursue cardiac catheterization for definitive assessment of coronary vasculature and to rule out underlying epicardial occlusive disease as a culprit for her ventricular tachycardia.    04/10/2024        She reports that she had no symptoms of palpitations since her last visit in the office.  We had a lengthy discussion regarding QT prolongation and its effect on her PVCs and palpitations.  She is currently followed by psychiatry for her medications and I have personally spoken to psychiatry.  Any titration of medication or addition of medication should be followed by close monitoring of ECG.  Additionally we discussed nonpsychiatric medications that could potentially lead to QT prolongation including but not limited to antibiotics.  She will make sure that these are brought to the attention of her primary if they are needed in the future.    10/10/2024        Beginning of August she was taken off vraylar. She had a rought time and had to go back on it. Her palpitations are worse this school year. Discussed increase bystolic but her blood pressure at home is lower. She has also had some vertigo. She has an applewatch and is working trying to figure out the EKG part of it. She is doing weight watchers and has lost 20#.    01/30/2025        She presents today with her .  She has multiple questions regarding her cardiac conditions.  We discussed the findings of her echocardiogram.  We discussed how the findings on the echocardiogram are reflective of sleep apnea, hypertension, and obesity.  She has a better understanding of how these factors are contributing to her edema.  She does report that her edema has significantly improved.  She still has pitting edema that is noted.  She reports that this level of edema is not bothersome to her.  She is working hard to  "limit her fluid consumption.  She is also working hard to limit her sodium consumption.  Her blood pressure today is excellent with a value of 109/74.  She was started on a diuretic and her sodium levels are mildly depressed.  We will recheck these in a few weeks to make sure that the sodium is not dropping any further.    Assessment & Plan    Impressions:  Palpitations  History of nonsustained ventricular tachycardia on ambulatory ECG     Negative cardiac catheterization March 2024  Obesity  Obstructive sleep apnea  Hypertension  Hypertensive cardiovascular disease  Hyperlipidemia  Hypothyroidism  Anxiety  Edema    Recommendations:  Continuation of her current cardiovascular regimen at the present time.     This includes statin, and antihypertensives  Basic metabolic panel 2 to 3 weeks  Follow-up as scheduled      Diagnoses and all orders for this visit:    1. Primary hypertension (Primary)  -     Basic Metabolic Panel; Future    2. Mixed hyperlipidemia    3. Class 3 severe obesity due to excess calories with serious comorbidity and body mass index (BMI) of 45.0 to 49.9 in adult    4. Anxiety    5. Obstructive sleep apnea    6. Peripheral edema                    Objective:    Vitals:  Vitals:    01/30/25 0943   BP: 109/74   BP Location: Left arm   Patient Position: Sitting   Pulse: 65   SpO2: 99%   Weight: 123 kg (272 lb)   Height: 160 cm (63\")             Body mass index is 48.18 kg/m².      Physical Exam:    General: Alert, cooperative, no distress, appears stated age  Head:  Normocephalic, atraumatic, mucous membranes moist  Eyes:  Conjunctiva/corneas clear, EOM's intact     Neck:  Supple,  no bruit    Lungs: Clear to auscultation bilaterally, no wheezes rhonchi rales are noted  Chest wall: No tenderness  Heart::  Regular rate and rhythm, S1 and S2 normal, 1/6 holosystolic murmur.  No rub or gallop  Abdomen: Soft, non-tender, nondistended bowel sounds active.  Obese  Extremities: No cyanosis, clubbing.  2+ " edema  Pulses: 2+ and symmetric all extremities  Skin:  No rashes or lesions  Neuro/psych: A&O x3. CN II through XII are grossly intact with appropriate affect      Allergies:  Allergies   Allergen Reactions    Clarithromycin Irritability    Yeast Hives     HIVES AND SOME ITCHING. DOES NOT DO YEAST FREE DIET       Medication Review:     Current Outpatient Medications:     atorvastatin (LIPITOR) 20 MG tablet, TAKE 1 TABLET BY MOUTH DAILY, Disp: 30 tablet, Rfl: 3    Cariprazine HCl (Vraylar) 1.5 MG capsule capsule, Take 1 capsule by mouth Daily., Disp: , Rfl:     clomiPRAMINE (ANAFRANIL) 50 MG capsule, 5 po q d, Disp: 150 capsule, Rfl: 6    clonazePAM (KlonoPIN) 0.5 MG tablet, TK 1 T PO TID PRA, Disp: , Rfl: 5    ferrous sulfate 324 MG tablet delayed-release, Take 1 tablet by mouth Every Other Day., Disp: 60 tablet, Rfl: 3    fluticasone (FLONASE) 50 MCG/ACT nasal spray, USE 2 SPRAYS IN EACH NOSTRIL DAILY AS DIRECTED BY PROVIDER, Disp: 48 g, Rfl: 1    furosemide (LASIX) 40 MG tablet, Take 1 tablet by mouth Daily. (Patient taking differently: Take 0.5 tablets by mouth 2 (Two) Times a Day.), Disp: 30 tablet, Rfl: 2    Klor-Con M20 20 MEQ CR tablet, TAKE 1 TABLET BY MOUTH DAILY, Disp: 30 tablet, Rfl: 3    levothyroxine (SYNTHROID, LEVOTHROID) 75 MCG tablet, TAKE 1 TABLET BY MOUTH DAILY, Disp: 90 tablet, Rfl: 1    losartan (COZAAR) 100 MG tablet, TAKE 1 TABLET BY MOUTH DAILY, Disp: 90 tablet, Rfl: 0    nebivolol (BYSTOLIC) 10 MG tablet, Take 1 tablet by mouth Daily., Disp: 90 tablet, Rfl: 3    omeprazole (priLOSEC) 20 MG capsule, TAKE 1 CAPSULE BY MOUTH 2 TIMES A DAY (Patient taking differently: Take 2 capsules by mouth Daily.), Disp: 180 capsule, Rfl: 1    OXcarbazepine (TRILEPTAL) 600 MG tablet, Take 2 tablets by mouth Every Evening., Disp: , Rfl:     clotrimazole (LOTRIMIN) 1 % cream, Apply 1 Application topically to the appropriate area as directed 2 (Two) Times a Day. As needed for rash. Use up to 2 weeks a time,  then give it a break (Patient not taking: Reported on 2025), Disp: 60 g, Rfl: 0    Clotrimazole 1 % ointment, Apply 1 Application topically 2 (Two) Times a Day As Needed (rash). Use up to two weeks a time, then give it a break (Patient not taking: Reported on 2025), Disp: 56.7 g, Rfl: 0    Family History:  Family History   Problem Relation Age of Onset    Hypertension Mother     Atrial fibrillation Mother     Diabetes Mother     Arthritis Mother     Hypertension Father     Diabetes Father     Bipolar disorder Father     Kidney cancer Father     Anxiety disorder Father     Mental illness Father         Ocd    Anxiety disorder Sister     Migraines Sister     Hypertension Brother     OCD Daughter        Past Medical History:  Past Medical History:   Diagnosis Date    Allergic     Anxiety     Arrhythmia     Arthritis     Bipolar 1 disorder     Cancer Oct. 2023    Renal cell carcinoma    Chronic kidney disease     Depression     GERD (gastroesophageal reflux disease)     Hyperlipidemia     Hypertension     Hypothyroidism     Obesity     OCD (obsessive compulsive disorder)     RASHARD (obstructive sleep apnea)     cpap    Ventricular tachycardia        Past Surgical History:  Past Surgical History:   Procedure Laterality Date    CARDIAC CATHETERIZATION N/A 2024    Procedure: Left Heart Cath;  Surgeon: Kashif Ramírez DO;  Location: [facility] CATH INVASIVE LOCATION;  Service: Cardiovascular;  Laterality: N/A;     SECTION      x1    NEPHRECTOMY PARTIAL Left     SINUS SURGERY         Social History:  Social History     Socioeconomic History    Marital status:     Number of children: 1   Tobacco Use    Smoking status: Never     Passive exposure: Never    Smokeless tobacco: Never   Vaping Use    Vaping status: Never Used   Substance and Sexual Activity    Alcohol use: Not Currently     Comment: Social    Drug use: Never    Sexual activity: Yes     Partners: Male     Birth  control/protection: Condom       Review of Systems:  The following systems were reviewed as they relate to the cardiovascular system: Constitutional, Eyes, ENT, Cardiovascular, Respiratory, Gastrointestinal, Integumentary, Neurological, Psychiatric, Hematologic, Endocrine, Musculoskeletal, and Genitourinary. The pertinent cardiovascular findings are reported above with all other cardiovascular points within those systems being negative.    Diagnostic Study Review:     Current Electrocardiogram:  Procedures no new EKG.  EKG dated 19 November 2024 demonstrates sinus rhythm with a ventricular rate of 87 bpm.    Laboratory Data:  Lab Results   Component Value Date    GLUCOSE 91 01/28/2025    BUN 15 01/28/2025    CREATININE 1.00 01/28/2025    EGFRIFNONA 91 02/08/2021    BCR 15.0 01/28/2025    K 4.3 01/28/2025    CO2 27.6 01/28/2025    CALCIUM 9.7 01/28/2025    ALBUMIN 4.1 11/19/2024    AST 22 11/19/2024    ALT 25 11/19/2024     Lab Results   Component Value Date    GLUCOSE 91 01/28/2025    CALCIUM 9.7 01/28/2025     (L) 01/28/2025    K 4.3 01/28/2025    CO2 27.6 01/28/2025    CL 93 (L) 01/28/2025    BUN 15 01/28/2025    CREATININE 1.00 01/28/2025    EGFRIFNONA 91 02/08/2021    BCR 15.0 01/28/2025    ANIONGAP 10.4 01/28/2025     Lab Results   Component Value Date    WBC 6.25 11/19/2024    HGB 12.9 11/19/2024    HCT 39.9 11/19/2024    MCV 97.3 (H) 11/19/2024     11/19/2024     Lab Results   Component Value Date    CHOL 167 01/15/2025    TRIG 110 01/15/2025    HDL 46 01/15/2025     (H) 01/15/2025     Lab Results   Component Value Date    HGBA1C 5.60 01/15/2025     Lab Results   Component Value Date    INR 1.01 03/04/2024    INR 1.0 10/18/2017    PROTIME 11.0 03/04/2024    PROTIME 11.1 10/18/2017       Most Recent Echo:  Results for orders placed during the hospital encounter of 01/22/25    Adult Transthoracic Echo Complete W/ Cont if Necessary Per Protocol    Interpretation Summary    Left ventricular  "ejection fraction appears to be 66 - 70%.    Left ventricular diastolic function is consistent with (grade II w/high LAP) pseudonormalization.    The right ventricular cavity is mildly dilated.    The left atrial cavity is moderately dilated.    Estimated right ventricular systolic pressure from tricuspid regurgitation is mildly elevated (35-45 mmHg). Calculated right ventricular systolic pressure from tricuspid regurgitation is 44 mmHg.       Most Recent Stress Test:  Results for orders placed during the hospital encounter of 02/02/21    Treadmill Stress Test    Interpretation Summary  · No ECG evidence of myocardial ischemia.Negative clinical evidence of myocardial ischemia. Findings consistent with a normal ECG stress test.  · Reached only 81% of the maximal yesterday controlled heart rate secondary to physical limitation       Most Recent Cardiac Catheterization:   No results found for this or any previous visit.       NOTE:     Today,01/30/2025 ,the following portions of the patient's note were reviewed, confirmed and/or updated as appropriate: History of present illness/Interval history, physical examination, assessment & plan, allergies, current medications, past family history, past medical history, past social history, past surgical history and problem list.    Labs pertinent to today's visit on 01/30/2025 (including but not limited to CBC, CMP, and lipid profiles) were requested from the patient's primary care provider/hospital/clinical laboratory.  If the labs were available for the visit, they were reviewed with the patient.  If they were not available, when received, special interest will be made to the labs pertinent to this visit.  The patient's most recent \"in-house\" labs are noted below and have been reviewed.  Outside labs pertinent to this visit are scanned into the record and have been reviewed.    Discussions held today, 01/30/2025,regarding procedures included risk, benefits, and options " including but not limited to: Death, MI, stroke, pain, bleeding, infection, and possible need for vascular/thoracic/cardiothoracic surgery.    Copied information within this note was reviewed and is current as of 01/30/2025.    Assessment and plan noted herein represents the current plan of care as of 01/30/2025.    Significant resources from our office and staff are inherent in engaging this patient today,01/30/2025,and in a continuous and active collaborative plan of care related to their chronic cardiovascular conditions outlined below.  The management of these conditions requires the direction of our service with specialized clinical knowledge, skills, and experience.  This collaborative care includes but is not limited to patient education, expectations and responsibilities, shared decision making around therapeutic goals, and shared commitments to achieve those goals.

## 2025-02-10 ENCOUNTER — OFFICE VISIT (OUTPATIENT)
Dept: FAMILY MEDICINE CLINIC | Facility: CLINIC | Age: 50
End: 2025-02-10
Payer: COMMERCIAL

## 2025-02-10 VITALS
BODY MASS INDEX: 48.09 KG/M2 | HEIGHT: 63 IN | SYSTOLIC BLOOD PRESSURE: 124 MMHG | OXYGEN SATURATION: 98 % | HEART RATE: 81 BPM | DIASTOLIC BLOOD PRESSURE: 78 MMHG | WEIGHT: 271.4 LBS

## 2025-02-10 DIAGNOSIS — E66.01 OBESITY, MORBID, BMI 40.0-49.9: ICD-10-CM

## 2025-02-10 DIAGNOSIS — R60.0 BILATERAL LEG EDEMA: Primary | ICD-10-CM

## 2025-02-10 PROBLEM — E66.813 CLASS 3 SEVERE OBESITY DUE TO EXCESS CALORIES WITH SERIOUS COMORBIDITY IN ADULT: Status: RESOLVED | Noted: 2019-08-01 | Resolved: 2025-02-10

## 2025-02-10 PROCEDURE — 99214 OFFICE O/P EST MOD 30 MIN: CPT | Performed by: STUDENT IN AN ORGANIZED HEALTH CARE EDUCATION/TRAINING PROGRAM

## 2025-02-10 NOTE — PROGRESS NOTES
"Chief Complaint  Chief Complaint   Patient presents with    Med Refill     fursimide       Subjective        Olga Garcia is a 49 y.o. female who presents to [facility].  History of Present Illness    Jazmin is a 49-year-old female here for multiple concerns.    Leg swelling  Was seen by cardiologist last month and her Lasix was increased from 20 mg to 40 mg due to acute on chronic leg swelling.  An echocardiogram was obtained which showed grade 2 diastolic function.  Since that time leg swelling has significantly improved.  She has been trying to limit sodium intake.      Obesity  Was recently prescribed semaglutide to help with weight loss but has not started the prescription yet.  Was hoping to discuss potential side effects first.            Objective   /78   Pulse 81   Ht 160 cm (63\")   Wt 123 kg (271 lb 6.4 oz)   SpO2 98%   BMI 48.08 kg/m²     Estimated body mass index is 48.08 kg/m² as calculated from the following:    Height as of this encounter: 160 cm (63\").    Weight as of this encounter: 123 kg (271 lb 6.4 oz).     Physical Exam   GEN: In no acute distress, non toxic appearing  HEENT: MMM. EOMI.   CV: No extremity edema visualized on exam today.  RESP: No signs of respiratory distress.  SKIN: No rashes  MSK: No deformity.   NEURO: Moves all extremities equally. Alert and appropriate             Result Review :              Assessment and Plan     Diagnoses and all orders for this visit:    1. Bilateral leg edema (Primary)  Given the recent increase of her Lasix dosing as well as underlying renal cell carcinoma will monitor kidney function closely.    Obtain BMP today and if normal can repeat in 1 month.    -     Basic Metabolic Panel    2. Obesity, morbid, BMI 40.0-49.9  Chronic, uncontrolled condition    Had lengthy discussion regarding importance of diet and exercise as a foundation for achieving and sustaining long-term weight loss.    Discussed common " side effects of GLP-1 receptor agonist as well as ways to decrease them.  Discussed plan to start at low-dose and titrate up monthly as tolerated.    Can start semaglutide 0.25 mg weekly when she is ready.          Follow Up     No follow-ups on file.

## 2025-02-14 RX ORDER — ATORVASTATIN CALCIUM 20 MG/1
20 TABLET, FILM COATED ORAL DAILY
Qty: 30 TABLET | Refills: 3 | Status: SHIPPED | OUTPATIENT
Start: 2025-02-14 | End: 2025-02-17

## 2025-02-17 RX ORDER — ATORVASTATIN CALCIUM 20 MG/1
20 TABLET, FILM COATED ORAL DAILY
Qty: 30 TABLET | Refills: 3 | Status: SHIPPED | OUTPATIENT
Start: 2025-02-17

## 2025-02-20 ENCOUNTER — LAB (OUTPATIENT)
Dept: FAMILY MEDICINE CLINIC | Facility: CLINIC | Age: 50
End: 2025-02-20
Payer: COMMERCIAL

## 2025-02-20 PROCEDURE — 80048 BASIC METABOLIC PNL TOTAL CA: CPT | Performed by: STUDENT IN AN ORGANIZED HEALTH CARE EDUCATION/TRAINING PROGRAM

## 2025-02-21 DIAGNOSIS — R60.0 BILATERAL LEG EDEMA: Primary | ICD-10-CM

## 2025-02-21 LAB
ANION GAP SERPL CALCULATED.3IONS-SCNC: 8.5 MMOL/L (ref 5–15)
BUN SERPL-MCNC: 15 MG/DL (ref 6–20)
BUN/CREAT SERPL: 15.3 (ref 7–25)
CALCIUM SPEC-SCNC: 9.2 MG/DL (ref 8.6–10.5)
CHLORIDE SERPL-SCNC: 99 MMOL/L (ref 98–107)
CO2 SERPL-SCNC: 28.5 MMOL/L (ref 22–29)
CREAT SERPL-MCNC: 0.98 MG/DL (ref 0.57–1)
EGFRCR SERPLBLD CKD-EPI 2021: 71.3 ML/MIN/1.73
GLUCOSE SERPL-MCNC: 89 MG/DL (ref 65–99)
POTASSIUM SERPL-SCNC: 4 MMOL/L (ref 3.5–5.2)
SODIUM SERPL-SCNC: 136 MMOL/L (ref 136–145)

## 2025-02-24 RX ORDER — MECLIZINE HYDROCHLORIDE 25 MG/1
25 TABLET ORAL 3 TIMES DAILY PRN
Qty: 30 TABLET | Refills: 0 | Status: SHIPPED | OUTPATIENT
Start: 2025-02-24

## 2025-02-24 RX ORDER — LEVOTHYROXINE SODIUM 75 UG/1
75 TABLET ORAL DAILY
Qty: 90 TABLET | Refills: 1 | Status: SHIPPED | OUTPATIENT
Start: 2025-02-24 | End: 2025-02-25

## 2025-02-25 RX ORDER — LEVOTHYROXINE SODIUM 75 UG/1
75 TABLET ORAL DAILY
Qty: 90 TABLET | Refills: 1 | Status: SHIPPED | OUTPATIENT
Start: 2025-02-25

## 2025-03-11 DIAGNOSIS — F41.9 ANXIETY: Primary | ICD-10-CM

## 2025-03-13 DIAGNOSIS — E87.6 HYPOKALEMIA: ICD-10-CM

## 2025-03-13 RX ORDER — POTASSIUM CHLORIDE 1500 MG/1
20 TABLET, EXTENDED RELEASE ORAL DAILY
Qty: 90 TABLET | Refills: 1 | Status: SHIPPED | OUTPATIENT
Start: 2025-03-13 | End: 2025-03-17

## 2025-03-16 DIAGNOSIS — E87.6 HYPOKALEMIA: ICD-10-CM

## 2025-03-17 RX ORDER — POTASSIUM CHLORIDE 1500 MG/1
20 TABLET, EXTENDED RELEASE ORAL DAILY
Qty: 90 TABLET | Refills: 1 | Status: SHIPPED | OUTPATIENT
Start: 2025-03-17

## 2025-03-19 DIAGNOSIS — E87.6 HYPOKALEMIA: ICD-10-CM

## 2025-03-19 RX ORDER — POTASSIUM CHLORIDE 1500 MG/1
20 TABLET, EXTENDED RELEASE ORAL DAILY
Qty: 90 TABLET | Refills: 1 | OUTPATIENT
Start: 2025-03-19

## 2025-03-21 ENCOUNTER — LAB (OUTPATIENT)
Dept: LAB | Facility: HOSPITAL | Age: 50
End: 2025-03-21
Payer: COMMERCIAL

## 2025-03-21 ENCOUNTER — TRANSCRIBE ORDERS (OUTPATIENT)
Dept: ADMINISTRATIVE | Facility: HOSPITAL | Age: 50
End: 2025-03-21
Payer: COMMERCIAL

## 2025-03-21 DIAGNOSIS — C64.9: ICD-10-CM

## 2025-03-21 DIAGNOSIS — C64.9: Primary | ICD-10-CM

## 2025-03-21 LAB
ALBUMIN SERPL-MCNC: 4.1 G/DL (ref 3.5–5.2)
ALBUMIN/GLOB SERPL: 1.4 G/DL
ALP SERPL-CCNC: 87 U/L (ref 39–117)
ALT SERPL W P-5'-P-CCNC: 25 U/L (ref 1–33)
ANION GAP SERPL CALCULATED.3IONS-SCNC: 9.1 MMOL/L (ref 5–15)
AST SERPL-CCNC: 20 U/L (ref 1–32)
BILIRUB SERPL-MCNC: 0.2 MG/DL (ref 0–1.2)
BUN SERPL-MCNC: 15 MG/DL (ref 6–20)
BUN/CREAT SERPL: 14.6 (ref 7–25)
CALCIUM SPEC-SCNC: 9 MG/DL (ref 8.6–10.5)
CHLORIDE SERPL-SCNC: 103 MMOL/L (ref 98–107)
CO2 SERPL-SCNC: 28.9 MMOL/L (ref 22–29)
CREAT SERPL-MCNC: 1.03 MG/DL (ref 0.57–1)
EGFRCR SERPLBLD CKD-EPI 2021: 67.2 ML/MIN/1.73
GLOBULIN UR ELPH-MCNC: 2.9 GM/DL
GLUCOSE SERPL-MCNC: 89 MG/DL (ref 65–99)
POTASSIUM SERPL-SCNC: 3.9 MMOL/L (ref 3.5–5.2)
PROT SERPL-MCNC: 7 G/DL (ref 6–8.5)
SODIUM SERPL-SCNC: 141 MMOL/L (ref 136–145)

## 2025-03-21 PROCEDURE — 80053 COMPREHEN METABOLIC PANEL: CPT

## 2025-03-21 PROCEDURE — 36415 COLL VENOUS BLD VENIPUNCTURE: CPT

## 2025-03-31 ENCOUNTER — OFFICE VISIT (OUTPATIENT)
Dept: FAMILY MEDICINE CLINIC | Facility: CLINIC | Age: 50
End: 2025-03-31
Payer: COMMERCIAL

## 2025-03-31 VITALS
HEIGHT: 63 IN | SYSTOLIC BLOOD PRESSURE: 124 MMHG | DIASTOLIC BLOOD PRESSURE: 84 MMHG | WEIGHT: 276.4 LBS | BODY MASS INDEX: 48.97 KG/M2 | HEART RATE: 74 BPM | OXYGEN SATURATION: 96 %

## 2025-03-31 DIAGNOSIS — R42 LIGHTHEADEDNESS: Primary | ICD-10-CM

## 2025-03-31 DIAGNOSIS — M54.50 ACUTE BILATERAL LOW BACK PAIN WITHOUT SCIATICA: ICD-10-CM

## 2025-03-31 PROCEDURE — 99214 OFFICE O/P EST MOD 30 MIN: CPT | Performed by: STUDENT IN AN ORGANIZED HEALTH CARE EDUCATION/TRAINING PROGRAM

## 2025-03-31 RX ORDER — OMEPRAZOLE 20 MG/1
40 CAPSULE, DELAYED RELEASE ORAL DAILY
Qty: 90 CAPSULE | Refills: 1 | Status: SHIPPED | OUTPATIENT
Start: 2025-03-31

## 2025-03-31 RX ORDER — CYCLOBENZAPRINE HCL 5 MG
5 TABLET ORAL 3 TIMES DAILY PRN
Qty: 30 TABLET | Refills: 0 | Status: SHIPPED | OUTPATIENT
Start: 2025-03-31

## 2025-03-31 NOTE — PROGRESS NOTES
"Chief Complaint  Chief Complaint   Patient presents with    Fatigue    Dizziness    Back Pain       Subjective        Olga Garcia is a 49 y.o. female who presents to [facility].  History of Present Illness    Jazmin is a 49 year old female here for multiple concerns.    Lightheadedness  For the last 2-3 weeks she has had episodes of feeling lightheaded  States that symptoms are present typically in the morning and gradually get better throughout the day  Describes symptoms \"like I just took a Benadryl\" and \"loopy\"  Symptoms get worse after she stands up but typically get better after a few minutes  In the past she has been diagnosed with vertigo but states this feels different, does not feel like the room is spinning          Bilateral lower back pain  Bilateral lower back pain for the last ~2 weeks  Denies injury  Has been taking Tylenol 1000 mg every 6 hours and applying ice which provide temporary relief  Today pain is slightly better but overall pain has not improved since onset          Objective   /84   Pulse 74   Ht 160 cm (63\")   Wt 125 kg (276 lb 6.4 oz)   SpO2 96%   BMI 48.96 kg/m²     Estimated body mass index is 48.96 kg/m² as calculated from the following:    Height as of this encounter: 160 cm (63\").    Weight as of this encounter: 125 kg (276 lb 6.4 oz).     Physical Exam   GEN: In no acute distress, non toxic appearing  HEENT: MMM. EOMI. No nystagmus appreciated.    CV: No extremity edema.   RESP: No signs of respiratory distress.  SKIN: No rashes  MSK: No deformity.  Mild tenderness to palpation of the paraspinal musculature of the lower lumbar spine.  No midline tenderness or step-off.  NEURO: Moves all extremities equally. Alert and appropriate           Result Review :              Assessment and Plan     Diagnoses and all orders for this visit:    1. Lightheadedness (Primary)  Undiagnosed new problem of uncertain prognosis  Based on description of " symptoms concern for orthostatic hypotension  CBC performed November 2024 which showed normal hemoglobin and hematocrit  CMP performed 3/21/2025 unremarkable  Recommend increasing fluid intake which she admits is a problem  Also recommend monitoring blood pressure and heart rate closely during these episodes      2. Acute bilateral low back pain without sciatica  No red flags based on history or exam  Can continue Tylenol for pain relief  Cannot have NSAIDs per nephrology   Can try Flexeril as needed for pain.  Counseled on potential sedation.    Advised that if there is no improvement in the next few weeks she should try physical therapy.      Other orders  -     cyclobenzaprine (FLEXERIL) 5 MG tablet; Take 1 tablet by mouth 3 (Three) Times a Day As Needed for Muscle Spasms.  Dispense: 30 tablet; Refill: 0            Follow Up     No follow-ups on file.

## 2025-04-01 ENCOUNTER — DOCUMENTATION (OUTPATIENT)
Dept: FAMILY MEDICINE CLINIC | Facility: CLINIC | Age: 50
End: 2025-04-01
Payer: COMMERCIAL

## 2025-04-07 NOTE — PROGRESS NOTES
Cardiology Office Visit      Encounter Date:  04/10/2025    Patient ID:   Olga Garcia is a 49 y.o. female.    Reason For Followup:  Palpitations    Brief Clinical History:  Dear Dr. Cage, Chaparro Lopes, DO    I had the pleasure of seeing Olga Garcia today. As you are well aware, this is a 49 y.o. female with no established history of ischemic heart disease.  She does have a history of palpitations, hypothyroidism, hypertension, obstructive sleep apnea, anxiety, and hyperlipidemia.  She presents today for follow-up on the above conditions.    Interval History:  She denies any chest pain pressure heaviness or tightness.  She denies any shortness of breath out of character.  She denies any PND orthopnea.  She denies any syncope or near syncope.  Other than some occasional palpitations, she reports feeling well.    Her blood pressure issues have completely resolved and she is normotensive today in the office.  She is tolerating her antihypertensive regimen quite nicely.    She does report that she was found to have a kidney mass.  She is going to have surgery by Dr. Cates to have this removed.    Since she is doing quite well from a cardiac and blood pressure perspective, I have given her the option to follow-up as needed and to continue to follow-up with you.    01/30/2024    She had some chest pressure last week. Felt like a small animal was on her chest. Went to ER and potassium was low. She got potassium and magnesium. Still having the pressure whe she sits on couch when she is doing school stuff. Will last a couple seconds and goes away. This feels distinctly different than her palpitations. Will do 2 week MCOT.    02/29/2024        As a result of her above symptoms she underwent a 2-week mobile cardiac telemetry.  Her study was abnormal with 1 episode of ventricular tachycardia that lasted 5 beats with a peak rate of 146 bpm.  She was symptomatic with this episode.    We do lengthy discussion regarding  ventricular tachycardia and its potential causative etiologies.  We discussed risk benefits and options including cardiac catheterization.  She wishes to pursue cardiac catheterization for definitive assessment of coronary vasculature and to rule out underlying epicardial occlusive disease as a culprit for her ventricular tachycardia.    04/10/2024        She reports that she had no symptoms of palpitations since her last visit in the office.  We had a lengthy discussion regarding QT prolongation and its effect on her PVCs and palpitations.  She is currently followed by psychiatry for her medications and I have personally spoken to psychiatry.  Any titration of medication or addition of medication should be followed by close monitoring of ECG.  Additionally we discussed nonpsychiatric medications that could potentially lead to QT prolongation including but not limited to antibiotics.  She will make sure that these are brought to the attention of her primary if they are needed in the future.    10/10/2024        Beginning of August she was taken off vraylar. She had a rought time and had to go back on it. Her palpitations are worse this school year. Discussed increase bystolic but her blood pressure at home is lower. She has also had some vertigo. She has an applewatch and is working trying to figure out the EKG part of it. She is doing weight watchers and has lost 20#.    01/30/2025        She presents today with her .  She has multiple questions regarding her cardiac conditions.  We discussed the findings of her echocardiogram.  We discussed how the findings on the echocardiogram are reflective of sleep apnea, hypertension, and obesity.  She has a better understanding of how these factors are contributing to her edema.  She does report that her edema has significantly improved.  She still has pitting edema that is noted.  She reports that this level of edema is not bothersome to her.  She is working hard to  "limit her fluid consumption.  She is also working hard to limit her sodium consumption.  Her blood pressure today is excellent with a value of 109/74.  She was started on a diuretic and her sodium levels are mildly depressed.  We will recheck these in a few weeks to make sure that the sodium is not dropping any further.    04/10/2025        She is having zingers from time to time. If she drinks too much coke and she will get palpitations. She has had some dizziness. She had a sleep study. They are looking into narcolepsy. They are talking about using modafinil. Disucussed increased dose of Vryalar and would hold until she been on  modafinil for 3 months. If she does OK, She will go up on Vryalar and get EKG within 2 weeks which should coincide with her next appointment.     Assessment & Plan    Impressions:  Palpitations  History of nonsustained ventricular tachycardia on ambulatory ECG     Negative cardiac catheterization March 2024  Obesity  Obstructive sleep apnea  Hypertension  Hypertensive cardiovascular disease  Hyperlipidemia  Hypothyroidism  Anxiety  Edema    Recommendations:  Continuation of her current cardiovascular regimen at the present time.     This includes statin, and antihypertensives  Patient would be low cardiovascular risk for the initiation of modafinil therapy  Will hold off on up titration of Vraylar until on modafinil for 3 months to avoid side effect/EKG confusion  Follow-up in August  EKG at next visit      Diagnoses and all orders for this visit:    1. Primary hypertension (Primary)    2. Mixed hyperlipidemia    3. Palpitations                      Objective:    Vitals:  Vitals:    04/10/25 1138   BP: 132/82   BP Location: Left arm   Patient Position: Sitting   Cuff Size: Adult   Pulse: 70   SpO2: 99%   Height: 160 cm (63\")               Body mass index is 48.96 kg/m².      Physical Exam:    General: Alert, cooperative, no distress, appears stated age  Head:  Normocephalic, atraumatic, " mucous membranes moist  Eyes:  Conjunctiva/corneas clear, EOM's intact     Neck:  Supple,  no bruit    Lungs: Clear to auscultation bilaterally, no wheezes rhonchi rales are noted  Chest wall: No tenderness  Heart::  Regular rate and rhythm, S1 and S2 normal, 1/6 holosystolic murmur.  No rub or gallop  Abdomen: Soft, non-tender, nondistended bowel sounds active.  Obese  Extremities: No cyanosis, clubbing.  2+ edema  Pulses: 2+ and symmetric all extremities  Skin:  No rashes or lesions  Neuro/psych: A&O x3. CN II through XII are grossly intact with appropriate affect      Allergies:  Allergies   Allergen Reactions    Clarithromycin Irritability    Yeast Hives     HIVES AND SOME ITCHING. DOES NOT DO YEAST FREE DIET       Medication Review:     Current Outpatient Medications:     atorvastatin (LIPITOR) 20 MG tablet, TAKE 1 TABLET BY MOUTH DAILY, Disp: 30 tablet, Rfl: 3    Cariprazine HCl (Vraylar) 1.5 MG capsule capsule, Take 1 capsule by mouth Daily., Disp: , Rfl:     clomiPRAMINE (ANAFRANIL) 50 MG capsule, 5 po q d, Disp: 150 capsule, Rfl: 6    clonazePAM (KlonoPIN) 0.5 MG tablet, TK 1 T PO TID PRA, Disp: , Rfl: 5    cyclobenzaprine (FLEXERIL) 5 MG tablet, Take 1 tablet by mouth 3 (Three) Times a Day As Needed for Muscle Spasms., Disp: 30 tablet, Rfl: 0    ferrous sulfate 324 MG tablet delayed-release, Take 1 tablet by mouth Every Other Day., Disp: 60 tablet, Rfl: 3    fluticasone (FLONASE) 50 MCG/ACT nasal spray, USE 2 SPRAYS IN EACH NOSTRIL DAILY AS DIRECTED BY PROVIDER, Disp: 48 g, Rfl: 1    furosemide (LASIX) 40 MG tablet, Take 1 tablet by mouth Daily. (Patient taking differently: Take 0.5 tablets by mouth 2 (Two) Times a Day.), Disp: 30 tablet, Rfl: 2    Klor-Con M20 20 MEQ CR tablet, TAKE 1 TABLET BY MOUTH DAILY, Disp: 90 tablet, Rfl: 1    levothyroxine (SYNTHROID, LEVOTHROID) 75 MCG tablet, TAKE 1 TABLET BY MOUTH DAILY, Disp: 90 tablet, Rfl: 1    losartan (COZAAR) 100 MG tablet, TAKE 1 TABLET BY MOUTH DAILY,  Disp: 90 tablet, Rfl: 0    nebivolol (BYSTOLIC) 10 MG tablet, Take 1 tablet by mouth Daily., Disp: 90 tablet, Rfl: 3    omeprazole (priLOSEC) 20 MG capsule, Take 2 capsules by mouth Daily., Disp: 90 capsule, Rfl: 1    OXcarbazepine (TRILEPTAL) 600 MG tablet, Take 2 tablets by mouth Every Evening., Disp: , Rfl:     Family History:  Family History   Problem Relation Age of Onset    Hypertension Mother     Atrial fibrillation Mother     Diabetes Mother     Arthritis Mother     Hypertension Father     Diabetes Father     Bipolar disorder Father     Kidney cancer Father     Anxiety disorder Father     Mental illness Father         Ocd    Anxiety disorder Sister     Migraines Sister     Hypertension Brother     OCD Daughter        Past Medical History:  Past Medical History:   Diagnosis Date    Allergic     Seasonal and Biaxin    Anxiety     Ocd    Arrhythmia     Arthritis     Bipolar 1 disorder     Cancer Oct. 2023    Renal cell carcinoma    Chronic kidney disease     Depression     GERD (gastroesophageal reflux disease)     Hyperlipidemia     Hypertension     Hypothyroidism     Obesity     OCD (obsessive compulsive disorder)     RASHARD (obstructive sleep apnea)     cpap    Ventricular tachycardia        Past Surgical History:  Past Surgical History:   Procedure Laterality Date    CARDIAC CATHETERIZATION N/A 2024    Procedure: Left Heart Cath;  Surgeon: Kashif Ramírez DO;  Location: [facility] CATH INVASIVE LOCATION;  Service: Cardiovascular;  Laterality: N/A;     SECTION      x1    NEPHRECTOMY PARTIAL Left     SINUS SURGERY         Social History:  Social History     Socioeconomic History    Marital status:     Number of children: 1   Tobacco Use    Smoking status: Never     Passive exposure: Never    Smokeless tobacco: Never   Vaping Use    Vaping status: Never Used   Substance and Sexual Activity    Alcohol use: Not Currently     Comment: Social    Drug use: Never    Sexual activity:  Yes     Partners: Male     Birth control/protection: Condom       Review of Systems:  The following systems were reviewed as they relate to the cardiovascular system: Constitutional, Eyes, ENT, Cardiovascular, Respiratory, Gastrointestinal, Integumentary, Neurological, Psychiatric, Hematologic, Endocrine, Musculoskeletal, and Genitourinary. The pertinent cardiovascular findings are reported above with all other cardiovascular points within those systems being negative.    Diagnostic Study Review:     Current Electrocardiogram:  Procedures no new EKG.  EKG dated 19 November 2024 demonstrates sinus rhythm with a ventricular rate of 87 bpm.    Laboratory Data:  Lab Results   Component Value Date    GLUCOSE 89 03/21/2025    BUN 15 03/21/2025    CREATININE 1.03 (H) 03/21/2025    EGFRIFNONA 91 02/08/2021    BCR 14.6 03/21/2025    K 3.9 03/21/2025    CO2 28.9 03/21/2025    CALCIUM 9.0 03/21/2025    ALBUMIN 4.1 03/21/2025    AST 20 03/21/2025    ALT 25 03/21/2025     Lab Results   Component Value Date    GLUCOSE 89 03/21/2025    CALCIUM 9.0 03/21/2025     03/21/2025    K 3.9 03/21/2025    CO2 28.9 03/21/2025     03/21/2025    BUN 15 03/21/2025    CREATININE 1.03 (H) 03/21/2025    EGFRIFNONA 91 02/08/2021    BCR 14.6 03/21/2025    ANIONGAP 9.1 03/21/2025     Lab Results   Component Value Date    WBC 6.25 11/19/2024    HGB 12.9 11/19/2024    HCT 39.9 11/19/2024    MCV 97.3 (H) 11/19/2024     11/19/2024     Lab Results   Component Value Date    CHOL 167 01/15/2025    TRIG 110 01/15/2025    HDL 46 01/15/2025     (H) 01/15/2025     Lab Results   Component Value Date    HGBA1C 5.60 01/15/2025     Lab Results   Component Value Date    INR 1.01 03/04/2024    INR 1.0 10/18/2017    PROTIME 11.0 03/04/2024    PROTIME 11.1 10/18/2017       Most Recent Echo:  Results for orders placed during the hospital encounter of 01/22/25    Adult Transthoracic Echo Complete W/ Cont if Necessary Per  "Protocol    Interpretation Summary    Left ventricular ejection fraction appears to be 66 - 70%.    Left ventricular diastolic function is consistent with (grade II w/high LAP) pseudonormalization.    The right ventricular cavity is mildly dilated.    The left atrial cavity is moderately dilated.    Estimated right ventricular systolic pressure from tricuspid regurgitation is mildly elevated (35-45 mmHg). Calculated right ventricular systolic pressure from tricuspid regurgitation is 44 mmHg.       Most Recent Stress Test:  Results for orders placed during the hospital encounter of 02/02/21    Treadmill Stress Test    Interpretation Summary  · No ECG evidence of myocardial ischemia.Negative clinical evidence of myocardial ischemia. Findings consistent with a normal ECG stress test.  · Reached only 81% of the maximal yesterday controlled heart rate secondary to physical limitation       Most Recent Cardiac Catheterization:   No results found for this or any previous visit.       NOTE:     Today,04/10/2025 ,the following portions of the patient's note were reviewed, confirmed and/or updated as appropriate: History of present illness/Interval history, physical examination, assessment & plan, allergies, current medications, past family history, past medical history, past social history, past surgical history and problem list.    Labs pertinent to today's visit on 04/10/2025 (including but not limited to CBC, CMP, and lipid profiles) were requested from the patient's primary care provider/hospital/clinical laboratory.  If the labs were available for the visit, they were reviewed with the patient.  If they were not available, when received, special interest will be made to the labs pertinent to this visit.  The patient's most recent \"in-house\" labs are noted below and have been reviewed.  Outside labs pertinent to this visit are scanned into the record and have been reviewed.    Discussions held today, 04/10/2025,regarding " procedures included risk, benefits, and options including but not limited to: Death, MI, stroke, pain, bleeding, infection, and possible need for vascular/thoracic/cardiothoracic surgery.    Copied information within this note was reviewed and is current as of 04/10/2025.    Assessment and plan noted herein represents the current plan of care as of 04/10/2025.    Significant resources from our office and staff are inherent in engaging this patient today,04/10/2025,and in a continuous and active collaborative plan of care related to their chronic cardiovascular conditions outlined below.  The management of these conditions requires the direction of our service with specialized clinical knowledge, skills, and experience.  This collaborative care includes but is not limited to patient education, expectations and responsibilities, shared decision making around therapeutic goals, and shared commitments to achieve those goals.

## 2025-04-10 ENCOUNTER — OFFICE VISIT (OUTPATIENT)
Dept: CARDIOLOGY | Facility: CLINIC | Age: 50
End: 2025-04-10
Payer: COMMERCIAL

## 2025-04-10 VITALS
HEIGHT: 63 IN | SYSTOLIC BLOOD PRESSURE: 132 MMHG | BODY MASS INDEX: 48.96 KG/M2 | OXYGEN SATURATION: 99 % | DIASTOLIC BLOOD PRESSURE: 82 MMHG | HEART RATE: 70 BPM

## 2025-04-10 DIAGNOSIS — I10 PRIMARY HYPERTENSION: Primary | ICD-10-CM

## 2025-04-10 DIAGNOSIS — E78.2 MIXED HYPERLIPIDEMIA: ICD-10-CM

## 2025-04-10 DIAGNOSIS — R00.2 PALPITATIONS: ICD-10-CM

## 2025-04-10 PROCEDURE — 99214 OFFICE O/P EST MOD 30 MIN: CPT | Performed by: INTERNAL MEDICINE

## 2025-04-10 NOTE — PATIENT INSTRUCTIONS
Follow-up August  If everything goes OK with Modafinil, start increased dose of Vraylar 2 weeks before follow-up so we can get EKG at that visit.

## 2025-04-15 DIAGNOSIS — R60.0 EDEMA LEG: ICD-10-CM

## 2025-04-15 RX ORDER — FUROSEMIDE 40 MG/1
40 TABLET ORAL DAILY
Qty: 30 TABLET | Refills: 2 | Status: SHIPPED | OUTPATIENT
Start: 2025-04-15

## 2025-04-28 RX ORDER — LOSARTAN POTASSIUM 100 MG/1
100 TABLET ORAL DAILY
Qty: 90 TABLET | Refills: 0 | Status: SHIPPED | OUTPATIENT
Start: 2025-04-28 | End: 2025-05-05

## 2025-05-05 RX ORDER — LOSARTAN POTASSIUM 100 MG/1
100 TABLET ORAL DAILY
Qty: 90 TABLET | Refills: 0 | Status: SHIPPED | OUTPATIENT
Start: 2025-05-05

## 2025-05-06 ENCOUNTER — TELEPHONE (OUTPATIENT)
Dept: CARDIOLOGY | Facility: CLINIC | Age: 50
End: 2025-05-06

## 2025-05-06 ENCOUNTER — CLINICAL SUPPORT (OUTPATIENT)
Dept: CARDIOLOGY | Facility: CLINIC | Age: 50
End: 2025-05-06
Payer: COMMERCIAL

## 2025-05-06 ENCOUNTER — PATIENT MESSAGE (OUTPATIENT)
Dept: CARDIOLOGY | Facility: CLINIC | Age: 50
End: 2025-05-06
Payer: COMMERCIAL

## 2025-05-06 DIAGNOSIS — I47.29 NONSUSTAINED VENTRICULAR TACHYCARDIA: ICD-10-CM

## 2025-05-06 DIAGNOSIS — R00.2 PALPITATIONS: ICD-10-CM

## 2025-05-06 DIAGNOSIS — I10 PRIMARY HYPERTENSION: ICD-10-CM

## 2025-05-06 DIAGNOSIS — F41.9 ANXIETY: Primary | ICD-10-CM

## 2025-05-06 NOTE — TELEPHONE ENCOUNTER
"Caller: Olga Garcia \"Jazmin Garcia\"    Relationship to patient: Self    Best call back number: [phone]    Patient is needing: PT IS NEEDING A NOTE FOR WORK FOR TODAY. PT WAS SEEN IN OFFICE TODAY. PLEASE UPLOAD TO [employer] AND MAIL TO ADDRESS ON FILE.         "

## 2025-05-09 ENCOUNTER — OFFICE VISIT (OUTPATIENT)
Dept: FAMILY MEDICINE CLINIC | Facility: CLINIC | Age: 50
End: 2025-05-09
Payer: COMMERCIAL

## 2025-05-09 VITALS
SYSTOLIC BLOOD PRESSURE: 136 MMHG | DIASTOLIC BLOOD PRESSURE: 80 MMHG | HEIGHT: 63 IN | OXYGEN SATURATION: 99 % | WEIGHT: 279.6 LBS | BODY MASS INDEX: 49.54 KG/M2 | HEART RATE: 80 BPM

## 2025-05-09 DIAGNOSIS — M54.50 CHRONIC BILATERAL LOW BACK PAIN WITHOUT SCIATICA: Primary | ICD-10-CM

## 2025-05-09 DIAGNOSIS — G89.29 CHRONIC BILATERAL LOW BACK PAIN WITHOUT SCIATICA: Primary | ICD-10-CM

## 2025-05-09 PROCEDURE — 99214 OFFICE O/P EST MOD 30 MIN: CPT | Performed by: STUDENT IN AN ORGANIZED HEALTH CARE EDUCATION/TRAINING PROGRAM

## 2025-05-09 RX ORDER — PREGABALIN 75 MG/1
75 CAPSULE ORAL 2 TIMES DAILY
Qty: 60 CAPSULE | Refills: 1 | Status: SHIPPED | OUTPATIENT
Start: 2025-05-09

## 2025-05-09 NOTE — PROGRESS NOTES
"Chief Complaint  Chief Complaint   Patient presents with    Back Pain       Subjective        Olga Garcia is a 49 y.o. female who presents to [facility].  History of Present Illness    Jazmin is a 49-year-old female here for back pain.      Patient was seen in the office on 3/31 for bilateral lower back pain.  At that time she was started on Flexeril and given HEP.  Patient states her pain has gradually worsened since then.  She has been doing the home exercises about once per day.  She notes that they feel good while she is doing the exercises but afterwards still has pain.  Has also been using Tylenol without any relief.  Has tried TENS unit without any improvement.              Objective   /80   Pulse 80   Ht 160 cm (63\")   Wt 127 kg (279 lb 9.6 oz)   SpO2 99%   BMI 49.53 kg/m²     Estimated body mass index is 49.53 kg/m² as calculated from the following:    Height as of this encounter: 160 cm (63\").    Weight as of this encounter: 127 kg (279 lb 9.6 oz).     Physical Exam   GEN: In no acute distress, non toxic appearing  HEENT: MMM. EOMI.   CV: No extremity edema.   RESP: No signs of respiratory distress.  SKIN: No rashes  MSK: No tenderness to palpation across the bilateral lower back.  No midline tenderness or step-off appreciated of the lumbar spine.  Negative straight leg raise bilaterally.  Negative JAVON maneuver bilaterally.  NEURO: Moves all extremities equally. Alert and appropriate           Result Review :              Assessment and Plan     Diagnoses and all orders for this visit:    1. Chronic bilateral low back pain without sciatica (Primary)  Chronic, uncontrolled condition  No red flags based on history or exam  Given the lack of improvement with HEP will refer to physical therapy for further evaluation  Given the degree of her pain prescription for Lyrica was sent to pharmacy.    If no improvement after 4-6 weeks we will pursue MRI.  Discussed " option for potential procedure to help with pain relief depending on what her MRI shows.  Patient states that she would be interested in pursuing procedural intervention if needed.      -     Ambulatory Referral to Physical Therapy for Evaluation & Treatment  -     pregabalin (Lyrica) 75 MG capsule; Take 1 capsule by mouth 2 (Two) Times a Day.  Dispense: 60 capsule; Refill: 1            Follow Up     No follow-ups on file.

## 2025-05-13 NOTE — TELEPHONE ENCOUNTER
Spoke with patient- this medication is not contraindicated with ahy of her conditions per dr Ramírez

## 2025-06-04 NOTE — PROGRESS NOTES
"Chief Complaint  Memory Loss    Subjective            Olga Garcia presents to [facility]UR[facility] for memory decline  History of Present Illness history of palpitations, hypothyroidism, hypertension, obstructive sleep apnea, anxiety, and hyperlipidemia.     Jazmin Garcia is a 49-year-old female seen today as a new patient for memory complaints.  She is accompanied by her mother who provides supplemental history.  Patient states that she first noticed radiation use about 17 years ago after the birth of her daughter.  She admits to having a major depressive episode postpartum.  She states that even as a child she had severe anxiety and OCD and has been on several medications her entire life for this.  She is concerned that she has a brain tumor.      Patient states that she forgets full conversations that she has had previously.  She also occasionally has difficulty with word finding or word transposition or replacement in conversation.    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  She has a masters level degree education.  She does have severe RASHARD and uses PAP therapy.  Her sleep specialist would like to do further testing for possible narcolepsy given her complaints of hypersomnolence despite effective PAP therapy.  She sleeps from 11:30 PM and wakes around 5 AM.  She often naps between 1 and 3 hours a day.  She does not participate in regular physical activity.    The patient reports that several aunts had Alzheimer's disease on her father side (elderly).    MoCA 28/30; MIS 13/50    Maximum   Score Patient's   Score Questions   5 5 \"What is the year?Season?Date?Day of the week?Month?\"   5 5 \"Where are we now: State?County?Town/city?Hospital?Floor?\"   3 3 3 Unrelated objects Number of trials:___   5 5 Count backward from 100 by sevens or spell WORLD backwards   3 1 Name 3 things from above   2 2 Identify 2 objects   1 1 Repeat the phrase: No ifs, ands,or buts. " "  3 3 Take paper in right hand, fold it in half, and put it on the floor.   1 1 Please read this and do what it says. \"Close your eyes\"   1 1 Make up and write a sentence about anything. Noun and verb   1 2 Copy this picture 10 angles must be present.   30 28 Total MMSE        Family History   Problem Relation Age of Onset    Hypertension Mother     Atrial fibrillation Mother     Diabetes Mother     Arthritis Mother     Hypertension Father     Diabetes Father     Bipolar disorder Father     Kidney cancer Father     Anxiety disorder Father     Mental illness Father         Ocd    Anxiety disorder Sister     Migraines Sister     Hypertension Brother     OCD Daughter        Past Medical History:   Diagnosis Date    Allergic     Seasonal and Biaxin    Anxiety     Ocd    Arrhythmia     Arthritis     Bipolar 1 disorder     Cancer Oct. 2023    Renal cell carcinoma    Chronic kidney disease     Depression     GERD (gastroesophageal reflux disease)     Hyperlipidemia     Hypertension     Hypothyroidism     Obesity     OCD (obsessive compulsive disorder)     RASHARD (obstructive sleep apnea)     cpap    Ventricular tachycardia        Social History     Socioeconomic History    Marital status:     Number of children: 1   Tobacco Use    Smoking status: Never     Passive exposure: Never    Smokeless tobacco: Never   Vaping Use    Vaping status: Never Used   Substance and Sexual Activity    Alcohol use: Not Currently     Comment: Social    Drug use: Never    Sexual activity: Yes     Partners: Male     Birth control/protection: Condom         Current Outpatient Medications:     atorvastatin (LIPITOR) 20 MG tablet, TAKE 1 TABLET BY MOUTH DAILY, Disp: 30 tablet, Rfl: 3    Cariprazine HCl (Vraylar) 1.5 MG capsule capsule, Take 2 capsules by mouth Daily., Disp: , Rfl:     clomiPRAMINE (ANAFRANIL) 50 MG capsule, 5 po q d, Disp: 150 capsule, Rfl: 6    clonazePAM (KlonoPIN) 0.5 MG tablet, TK 1 T PO TID PRA, Disp: , Rfl: 5    ferrous " "sulfate 324 MG tablet delayed-release, Take 1 tablet by mouth Every Other Day., Disp: 60 tablet, Rfl: 3    fluticasone (FLONASE) 50 MCG/ACT nasal spray, USE 2 SPRAYS IN EACH NOSTRIL DAILY AS DIRECTED BY PROVIDER, Disp: 48 g, Rfl: 1    furosemide (LASIX) 40 MG tablet, TAKE 1 TABLET BY MOUTH DAILY, Disp: 30 tablet, Rfl: 2    Klor-Con M20 20 MEQ CR tablet, TAKE 1 TABLET BY MOUTH DAILY, Disp: 90 tablet, Rfl: 1    levothyroxine (SYNTHROID, LEVOTHROID) 75 MCG tablet, TAKE 1 TABLET BY MOUTH DAILY, Disp: 90 tablet, Rfl: 1    losartan (COZAAR) 100 MG tablet, TAKE 1 TABLET BY MOUTH DAILY, Disp: 90 tablet, Rfl: 0    nebivolol (BYSTOLIC) 10 MG tablet, Take 1 tablet by mouth Daily., Disp: 90 tablet, Rfl: 3    omeprazole (priLOSEC) 20 MG capsule, Take 2 capsules by mouth Daily., Disp: 90 capsule, Rfl: 1    OXcarbazepine (TRILEPTAL) 600 MG tablet, Take 2 tablets by mouth Every Evening., Disp: , Rfl:     diazePAM (VALIUM) 5 MG tablet, Take 5 mg PO 30-45 minutes prior to MRI, may repeat x 1 if needed, Disp: 2 tablet, Rfl: 0    Review of Systems   Constitutional:  Positive for fatigue.   HENT:  Positive for congestion and sinus pressure. Negative for sinus pain.    Eyes:  Positive for visual disturbance.   Respiratory:  Positive for apnea.    Cardiovascular:  Positive for palpitations and leg swelling.   Allergic/Immunologic: Positive for environmental allergies and food allergies.            Objective   Vital Signs:   /71   Pulse 78   Ht 160 cm (63\")   Wt 129 kg (284 lb)   BMI 50.31 kg/m²     Physical Exam  Vitals reviewed.   Constitutional:       Appearance: She is well-developed. She is morbidly obese.   HENT:      Head: Normocephalic and atraumatic.      Comments: MMP 4, enlarged tongue base  Eyes:      General: Lids are normal.      Extraocular Movements: Extraocular movements intact.      Pupils: Pupils are equal, round, and reactive to light.   Pulmonary:      Effort: Pulmonary effort is normal.   Musculoskeletal:    " "  Cervical back: Normal range of motion and neck supple.   Skin:     General: Skin is warm and dry.   Neurological:      Mental Status: She is oriented to person, place, and time.      Cranial Nerves: Cranial nerves 2-12 are intact. No cranial nerve deficit.      Sensory: No sensory deficit.      Motor: Motor function is intact. No weakness or tremor.      Coordination: Finger-Nose-Finger Test normal. Rapid alternating movements normal.      Gait: Gait normal.      Deep Tendon Reflexes:      Reflex Scores:       Tricep reflexes are 2+ on the right side and 2+ on the left side.       Bicep reflexes are 2+ on the right side and 2+ on the left side.       Brachioradialis reflexes are 2+ on the right side and 2+ on the left side.       Patellar reflexes are 2+ on the right side and 2+ on the left side.  Psychiatric:         Attention and Perception: Attention normal.         Mood and Affect: Mood is anxious.         Speech: Speech normal.         Behavior: Behavior normal.         Cognition and Memory: Cognition and memory normal.         Judgment: Judgment normal.      Comments: Patient had no difficulty with recall when discussing history and throughout visit today.        Result Review :          MRI brain without contrast 5/18/2020  Unremarkable    Labs  3/5/2024: B12 270, folate 13.4, iron profile WNL    Hemoglobin A1c 5.6, TSH 1.23 (1/15/2025)        Neurological Exam  Mental Status  Awake, alert and oriented to person, place and time. Oriented to person, place, and time. Memory is normal. Speech is normal. MMSE score: 28.  MoCA 28/30; MIS 13/15  Patient had trouble with attention in subtracting serial sevens.  Patient started with subtracting 7 and then said \" maybe I was post to sixes\" and continued to subtract by 6.  He also missed 1 delayed recall word but was able to get it with multiple-choice cue..    Cranial Nerves  CN II: Visual acuity is normal. Visual fields full to confrontation.  CN III, IV, VI: " Extraocular movements intact bilaterally. Normal lids and orbits bilaterally. Pupils equal round and reactive to light bilaterally.  CN V: Facial sensation is normal.  CN VII: Full and symmetric facial movement.  CN IX, X: Palate elevates symmetrically. Normal gag reflex.  CN XI: Shoulder shrug strength is normal.  CN XII: Tongue midline without atrophy or fasciculations.    Motor  Normal muscle bulk throughout. Normal muscle tone. No pronator drift.                                             Right                     Left   Shoulder abduction               5                          5  Elbow flexion                         5                          5  Elbow extension                    5                          5  Wrist flexion                           5                          5  Wrist extension                      5                          5  Hip flexion                              5                          5    Sensory  Light touch is normal in upper and lower extremities. Temperature is normal in upper and lower extremities. Vibration is normal in upper and lower extremities.     Reflexes                                            Right                      Left  Brachioradialis                    2+                         2+  Biceps                                 2+                         2+  Triceps                                2+                         2+  Patellar                                2+                         2+    Coordination  No tremorRight: Finger-to-nose normal. Rapid alternating movement normal.Left: Finger-to-nose normal. Rapid alternating movement normal.    Gait   Normal gait.Casual gait is normal including stance, stride, and arm swing. Able to rise from chair without using arms.              Assessment and Plan    Diagnoses and all orders for this visit:    1. Memory loss (Primary)  -     Vitamin B12 & Folate  -     PTH, Intact & Calcium; Future  -     Copper, Serum;  Future  -     Heavy Metals, Blood  -     CBC & Differential; Future  -     Vitamin E; Future  -     Vitamin D,25-Hydroxy; Future  -     Cancel: MRI Brain With & Without Contrast; Future  -     Ambulatory Referral to Neuropsychology  -     MRI Brain With & Without Contrast; Future    2. Claustrophobia  -     diazePAM (VALIUM) 5 MG tablet; Take 5 mg PO 30-45 minutes prior to MRI, may repeat x 1 if needed  Dispense: 2 tablet; Refill: 0    3. Anxiety    4. Obstructive sleep apnea      Jazmin Garcia is seen today as a new patient for memory complaints.  She states symptoms started at least 17 years ago, after the birth of her child.  She has a longstanding history of OCD and severe anxiety and depression since childhood.  She is seeing a new psychiatrist in the near future due to poorly controlled anxiety> OCD.  She has trouble remembering conversations and has a tendency to transpose or replace words in conversation and has word finding difficulty.  She feels stress plays a role in her cognitive functioning.    MMSE today is 28/30, MoCA 28/30 (had difficulty with attention and serial sevens and missed 1 delayed recall, but was able to remember the word with multiple-choice cue).    I explained to the patient that I do not believe this is a brain tumor or something organic like Alzheimer's disease.  I believe symptoms are most likely related to mental health and behavioral health history (anxiety and OCD).  She had a B12 level checked back in 3/2024 which was low (278).    Will check additional labs today.  She is not high risk for HIV or syphilis so we will defer.    I would like to repeat MRI brain with and without contrast.  Last MRI brain was completed in 2020 and was unremarkable.  [facility]  She has claustrophobia and would like medication to take prior to imaging.  Will order diazepam 5 mg to take 30-45 minutes prior to MRI, she may repeat x 1 if needed.  She should not drive  while on diazepam.    Patient has severe RASHARD and wears PAP therapy daily.  Continue PAP therapy over 4 hours every night and follow-up with her sleep specialist regarding possible narcolepsy.  I would recommend she go to bed earlier in the evening to try to get more hours of sleep every night.  She is likely not getting enough at night and is requiring naps during the day.  Her medications could also be contributing to her fatigue and memory.    Work with psychiatry for management of anxiety and OCD.    Will refer patient to neuropsychological testing given her age.    She will follow-up after testing, but is encouraged to contact the office in the meantime with questions or concerns.        I spent 79 minutes caring for Olga on this date of service. This time includes time spent by me in the following activities:preparing for the visit, reviewing tests, performing a medically appropriate examination and/or evaluation , counseling and educating the patient/family/caregiver, ordering medications, tests, or procedures, referring and communicating with other health care professionals , and documenting information in the medical record  Follow Up   No follow-ups on file.  Patient was given instructions and counseling regarding her condition or for health maintenance advice. Please see specific information pulled into the AVS if appropriate.         This document has been electronically signed by ISAI Melchor on June 6, 2025 12:22 EDT

## 2025-06-06 ENCOUNTER — OFFICE VISIT (OUTPATIENT)
Dept: NEUROLOGY | Facility: CLINIC | Age: 50
End: 2025-06-06
Payer: COMMERCIAL

## 2025-06-06 VITALS
SYSTOLIC BLOOD PRESSURE: 147 MMHG | HEIGHT: 63 IN | WEIGHT: 284 LBS | DIASTOLIC BLOOD PRESSURE: 71 MMHG | HEART RATE: 78 BPM | BODY MASS INDEX: 50.32 KG/M2

## 2025-06-06 DIAGNOSIS — R41.3 MEMORY LOSS: Primary | ICD-10-CM

## 2025-06-06 DIAGNOSIS — G47.33 OBSTRUCTIVE SLEEP APNEA: ICD-10-CM

## 2025-06-06 DIAGNOSIS — F40.240 CLAUSTROPHOBIA: ICD-10-CM

## 2025-06-06 DIAGNOSIS — F41.9 ANXIETY: ICD-10-CM

## 2025-06-06 RX ORDER — DIAZEPAM 5 MG/1
TABLET ORAL
Qty: 2 TABLET | Refills: 0 | Status: SHIPPED | OUTPATIENT
Start: 2025-06-06

## 2025-06-11 ENCOUNTER — PATIENT ROUNDING (BHMG ONLY) (OUTPATIENT)
Dept: NEUROLOGY | Facility: CLINIC | Age: 50
End: 2025-06-11
Payer: COMMERCIAL

## 2025-06-11 DIAGNOSIS — D50.8 OTHER IRON DEFICIENCY ANEMIA: ICD-10-CM

## 2025-06-12 RX ORDER — FERROUS SULFATE 324(65)MG
TABLET, DELAYED RELEASE (ENTERIC COATED) ORAL
Qty: 60 TABLET | Refills: 3 | Status: SHIPPED | OUTPATIENT
Start: 2025-06-12

## 2025-06-16 ENCOUNTER — LAB (OUTPATIENT)
Dept: LAB | Facility: HOSPITAL | Age: 50
End: 2025-06-16
Payer: COMMERCIAL

## 2025-06-16 DIAGNOSIS — R41.3 MEMORY LOSS: ICD-10-CM

## 2025-06-16 LAB
25(OH)D3 SERPL-MCNC: 17.4 NG/ML (ref 30–100)
BASOPHILS # BLD AUTO: 0.02 10*3/MM3 (ref 0–0.2)
BASOPHILS NFR BLD AUTO: 0.3 % (ref 0–1.5)
CALCIUM SPEC-SCNC: 9 MG/DL (ref 8.6–10.5)
DEPRECATED RDW RBC AUTO: 44.9 FL (ref 37–54)
EOSINOPHIL # BLD AUTO: 0.14 10*3/MM3 (ref 0–0.4)
EOSINOPHIL NFR BLD AUTO: 2.4 % (ref 0.3–6.2)
ERYTHROCYTE [DISTWIDTH] IN BLOOD BY AUTOMATED COUNT: 12.8 % (ref 12.3–15.4)
FOLATE SERPL-MCNC: 7.35 NG/ML (ref 4.78–24.2)
HCT VFR BLD AUTO: 40 % (ref 34–46.6)
HGB BLD-MCNC: 13.1 G/DL (ref 12–15.9)
IMM GRANULOCYTES # BLD AUTO: 0.01 10*3/MM3 (ref 0–0.05)
IMM GRANULOCYTES NFR BLD AUTO: 0.2 % (ref 0–0.5)
LYMPHOCYTES # BLD AUTO: 1.23 10*3/MM3 (ref 0.7–3.1)
LYMPHOCYTES NFR BLD AUTO: 21.2 % (ref 19.6–45.3)
MCH RBC QN AUTO: 31.3 PG (ref 26.6–33)
MCHC RBC AUTO-ENTMCNC: 32.8 G/DL (ref 31.5–35.7)
MCV RBC AUTO: 95.7 FL (ref 79–97)
MONOCYTES # BLD AUTO: 0.33 10*3/MM3 (ref 0.1–0.9)
MONOCYTES NFR BLD AUTO: 5.7 % (ref 5–12)
NEUTROPHILS NFR BLD AUTO: 4.08 10*3/MM3 (ref 1.7–7)
NEUTROPHILS NFR BLD AUTO: 70.2 % (ref 42.7–76)
NRBC BLD AUTO-RTO: 0 /100 WBC (ref 0–0.2)
PLATELET # BLD AUTO: 209 10*3/MM3 (ref 140–450)
PMV BLD AUTO: 10.6 FL (ref 6–12)
PTH-INTACT SERPL-MCNC: 72.7 PG/ML (ref 15–65)
RBC # BLD AUTO: 4.18 10*6/MM3 (ref 3.77–5.28)
VIT B12 BLD-MCNC: 321 PG/ML (ref 211–946)
WBC NRBC COR # BLD AUTO: 5.81 10*3/MM3 (ref 3.4–10.8)

## 2025-06-16 PROCEDURE — 82746 ASSAY OF FOLIC ACID SERUM: CPT | Performed by: NURSE PRACTITIONER

## 2025-06-16 PROCEDURE — 85025 COMPLETE CBC W/AUTO DIFF WBC: CPT

## 2025-06-16 PROCEDURE — 82306 VITAMIN D 25 HYDROXY: CPT

## 2025-06-16 PROCEDURE — 83825 ASSAY OF MERCURY: CPT | Performed by: NURSE PRACTITIONER

## 2025-06-16 PROCEDURE — 83970 ASSAY OF PARATHORMONE: CPT

## 2025-06-16 PROCEDURE — 82525 ASSAY OF COPPER: CPT

## 2025-06-16 PROCEDURE — 82175 ASSAY OF ARSENIC: CPT | Performed by: NURSE PRACTITIONER

## 2025-06-16 PROCEDURE — 83655 ASSAY OF LEAD: CPT | Performed by: NURSE PRACTITIONER

## 2025-06-16 PROCEDURE — 82310 ASSAY OF CALCIUM: CPT

## 2025-06-16 PROCEDURE — 84446 ASSAY OF VITAMIN E: CPT

## 2025-06-16 PROCEDURE — 36415 COLL VENOUS BLD VENIPUNCTURE: CPT

## 2025-06-16 PROCEDURE — 82607 VITAMIN B-12: CPT | Performed by: NURSE PRACTITIONER

## 2025-06-19 ENCOUNTER — OFFICE VISIT (OUTPATIENT)
Dept: CARDIOLOGY | Facility: CLINIC | Age: 50
End: 2025-06-19
Payer: COMMERCIAL

## 2025-06-19 VITALS
WEIGHT: 283 LBS | HEIGHT: 63 IN | HEART RATE: 73 BPM | OXYGEN SATURATION: 100 % | DIASTOLIC BLOOD PRESSURE: 84 MMHG | BODY MASS INDEX: 50.14 KG/M2 | SYSTOLIC BLOOD PRESSURE: 123 MMHG

## 2025-06-19 DIAGNOSIS — R00.2 PALPITATIONS: ICD-10-CM

## 2025-06-19 DIAGNOSIS — I10 PRIMARY HYPERTENSION: Primary | ICD-10-CM

## 2025-06-19 DIAGNOSIS — E78.2 MIXED HYPERLIPIDEMIA: ICD-10-CM

## 2025-06-19 PROCEDURE — 99214 OFFICE O/P EST MOD 30 MIN: CPT | Performed by: INTERNAL MEDICINE

## 2025-06-19 PROCEDURE — 93000 ELECTROCARDIOGRAM COMPLETE: CPT | Performed by: INTERNAL MEDICINE

## 2025-06-19 NOTE — PROGRESS NOTES
Cardiology Office Visit      Encounter Date:  06/19/2025    Patient ID:   Olga Garcia is a 49 y.o. female.    Reason For Followup:  Palpitations    Brief Clinical History:  Dear Dr. Cage, Chaparro Lopes, DO    I had the pleasure of seeing Olga Garcia today. As you are well aware, this is a 49 y.o. female with no established history of ischemic heart disease.  She does have a history of palpitations, hypothyroidism, hypertension, obstructive sleep apnea, anxiety, and hyperlipidemia.  She presents today for follow-up on the above conditions.    Interval History:  She denies any chest pain pressure heaviness or tightness.  She denies any shortness of breath out of character.  She denies any PND orthopnea.  She denies any syncope or near syncope.  Other than some occasional palpitations, she reports feeling well.    Her blood pressure issues have completely resolved and she is normotensive today in the office.  She is tolerating her antihypertensive regimen quite nicely.    She does report that she was found to have a kidney mass.  She is going to have surgery by Dr. Cates to have this removed.    Since she is doing quite well from a cardiac and blood pressure perspective, I have given her the option to follow-up as needed and to continue to follow-up with you.    01/30/2024    She had some chest pressure last week. Felt like a small animal was on her chest. Went to ER and potassium was low. She got potassium and magnesium. Still having the pressure whe she sits on couch when she is doing school stuff. Will last a couple seconds and goes away. This feels distinctly different than her palpitations. Will do 2 week MCOT.    02/29/2024        As a result of her above symptoms she underwent a 2-week mobile cardiac telemetry.  Her study was abnormal with 1 episode of ventricular tachycardia that lasted 5 beats with a peak rate of 146 bpm.  She was symptomatic with this episode.    We do lengthy discussion regarding  ventricular tachycardia and its potential causative etiologies.  We discussed risk benefits and options including cardiac catheterization.  She wishes to pursue cardiac catheterization for definitive assessment of coronary vasculature and to rule out underlying epicardial occlusive disease as a culprit for her ventricular tachycardia.    04/10/2024        She reports that she had no symptoms of palpitations since her last visit in the office.  We had a lengthy discussion regarding QT prolongation and its effect on her PVCs and palpitations.  She is currently followed by psychiatry for her medications and I have personally spoken to psychiatry.  Any titration of medication or addition of medication should be followed by close monitoring of ECG.  Additionally we discussed nonpsychiatric medications that could potentially lead to QT prolongation including but not limited to antibiotics.  She will make sure that these are brought to the attention of her primary if they are needed in the future.    10/10/2024        Beginning of August she was taken off vraylar. She had a rought time and had to go back on it. Her palpitations are worse this school year. Discussed increase bystolic but her blood pressure at home is lower. She has also had some vertigo. She has an applewatch and is working trying to figure out the EKG part of it. She is doing weight watchers and has lost 20#.    01/30/2025        She presents today with her .  She has multiple questions regarding her cardiac conditions.  We discussed the findings of her echocardiogram.  We discussed how the findings on the echocardiogram are reflective of sleep apnea, hypertension, and obesity.  She has a better understanding of how these factors are contributing to her edema.  She does report that her edema has significantly improved.  She still has pitting edema that is noted.  She reports that this level of edema is not bothersome to her.  She is working hard to  limit her fluid consumption.  She is also working hard to limit her sodium consumption.  Her blood pressure today is excellent with a value of 109/74.  She was started on a diuretic and her sodium levels are mildly depressed.  We will recheck these in a few weeks to make sure that the sodium is not dropping any further.    04/10/2025        She is having zingers from time to time. If she drinks too much coke and she will get palpitations. She has had some dizziness. She had a sleep study. They are looking into narcolepsy. They are talking about using modafinil. Disucussed increased dose of Vryalar and would hold until she been on  modafinil for 3 months. If she does OK, She will go up on Vryalar and get EKG within 2 weeks which should coincide with her next appointment.     06/19/2025        Some occasional palpitations and zingers but they are much better than they have been before. Her EKG was unchanged.  No other new issues.    She has seen neurologist and is going to get an MRI.     Assessment & Plan    Impressions:  Palpitations  History of nonsustained ventricular tachycardia on ambulatory ECG     Negative cardiac catheterization March 2024  Obesity  Obstructive sleep apnea  Hypertension  Hypertensive cardiovascular disease  Hyperlipidemia  Hypothyroidism  Anxiety  Edema    Recommendations:  Continuation of her current cardiovascular regimen at the present time.     This includes statin, and antihypertensives  Periodic ECG to evaluate for QT interval.  Follow-up in 6 months time sooner should there be difficulties.      Diagnoses and all orders for this visit:    1. Primary hypertension (Primary)  -     ECG 12 Lead    2. Palpitations  -     ECG 12 Lead    3. Mixed hyperlipidemia  -     ECG 12 Lead                        Objective:    Vitals:  Vitals:    06/19/25 1301   BP: 123/84   BP Location: Left arm   Patient Position: Sitting   Cuff Size: Large Adult   Pulse: 73   SpO2: 100%   Weight: 128 kg (283 lb)  "  Height: 160 cm (63\")                 Body mass index is 50.13 kg/m².      Physical Exam:    General: Alert, cooperative, no distress, appears stated age  Head:  Normocephalic, atraumatic, mucous membranes moist  Eyes:  Conjunctiva/corneas clear, EOM's intact     Neck:  Supple,  no bruit    Lungs: Clear to auscultation bilaterally, no wheezes rhonchi rales are noted  Chest wall: No tenderness  Heart::  Regular rate and rhythm, S1 and S2 normal, 1/6 holosystolic murmur.  No rub or gallop  Abdomen: Soft, non-tender, nondistended bowel sounds active.  Obese  Extremities: No cyanosis, clubbing.  2+ edema  Pulses: 2+ and symmetric all extremities  Skin:  No rashes or lesions  Neuro/psych: A&O x3. CN II through XII are grossly intact with appropriate affect      Allergies:  Allergies   Allergen Reactions    Clarithromycin Irritability    Yeast Hives     HIVES AND SOME ITCHING. DOES NOT DO YEAST FREE DIET       Medication Review:     Current Outpatient Medications:     atorvastatin (LIPITOR) 20 MG tablet, TAKE 1 TABLET BY MOUTH DAILY, Disp: 30 tablet, Rfl: 3    Cariprazine HCl (Vraylar) 1.5 MG capsule capsule, Take 2 capsules by mouth Daily., Disp: , Rfl:     clomiPRAMINE (ANAFRANIL) 50 MG capsule, 5 po q d, Disp: 150 capsule, Rfl: 6    clonazePAM (KlonoPIN) 0.5 MG tablet, TK 1 T PO TID PRA, Disp: , Rfl: 5    diazePAM (VALIUM) 5 MG tablet, Take 5 mg PO 30-45 minutes prior to MRI, may repeat x 1 if needed, Disp: 2 tablet, Rfl: 0    ferrous sulfate 324 (65 Fe) MG tablet delayed-release EC tablet, TAKE ONE TABLET BY MOUTH TWO TIMES A DAY WITH FOOD, Disp: 60 tablet, Rfl: 3    fluticasone (FLONASE) 50 MCG/ACT nasal spray, USE 2 SPRAYS IN EACH NOSTRIL DAILY AS DIRECTED BY PROVIDER, Disp: 48 g, Rfl: 1    furosemide (LASIX) 40 MG tablet, TAKE 1 TABLET BY MOUTH DAILY, Disp: 30 tablet, Rfl: 2    Klor-Con M20 20 MEQ CR tablet, TAKE 1 TABLET BY MOUTH DAILY, Disp: 90 tablet, Rfl: 1    levothyroxine (SYNTHROID, LEVOTHROID) 75 MCG " tablet, TAKE 1 TABLET BY MOUTH DAILY, Disp: 90 tablet, Rfl: 1    losartan (COZAAR) 100 MG tablet, TAKE 1 TABLET BY MOUTH DAILY, Disp: 90 tablet, Rfl: 0    nebivolol (BYSTOLIC) 10 MG tablet, Take 1 tablet by mouth Daily., Disp: 90 tablet, Rfl: 3    omeprazole (priLOSEC) 20 MG capsule, Take 2 capsules by mouth Daily., Disp: 90 capsule, Rfl: 1    OXcarbazepine (TRILEPTAL) 600 MG tablet, Take 2 tablets by mouth Every Evening., Disp: , Rfl:     vitamin B-12 (CYANOCOBALAMIN) 1000 MCG tablet, Take 1 tablet by mouth Daily., Disp: 30 tablet, Rfl: 11    vitamin D (ERGOCALCIFEROL) 1.25 MG (24514 UT) capsule capsule, Take 50,000 units by mouth weekly x 8 weeks., Disp: 8 capsule, Rfl: 0    Family History:  Family History   Problem Relation Age of Onset    Hypertension Mother     Atrial fibrillation Mother     Diabetes Mother     Arthritis Mother     Hypertension Father     Diabetes Father     Bipolar disorder Father     Kidney cancer Father     Anxiety disorder Father     Mental illness Father         Ocd    Anxiety disorder Sister     Migraines Sister     Hypertension Brother     OCD Daughter        Past Medical History:  Past Medical History:   Diagnosis Date    Allergic     Seasonal and Biaxin    Anxiety     Ocd    Arrhythmia     Arthritis     Bipolar 1 disorder     Cancer Oct. 2023    Renal cell carcinoma    Chronic kidney disease     Depression     GERD (gastroesophageal reflux disease)     Hyperlipidemia     Hypertension     Hypothyroidism     Obesity     OCD (obsessive compulsive disorder)     RASHARD (obstructive sleep apnea)     cpap    Ventricular tachycardia        Past Surgical History:  Past Surgical History:   Procedure Laterality Date    CARDIAC CATHETERIZATION N/A 2024    Procedure: Left Heart Cath;  Surgeon: Kashif Ramírez DO;  Location: [facility] CATH INVASIVE LOC[facility];  Service: Cardiovascular;  Laterality: N/A;     SECTION      x1    NEPHRECTOMY PARTIAL Left     SINUS SURGERY          Social History:  Social History     Socioeconomic History    Marital status:     Number of children: 1   Tobacco Use    Smoking status: Never     Passive exposure: Never    Smokeless tobacco: Never   Vaping Use    Vaping status: Never Used   Substance and Sexual Activity    Alcohol use: Not Currently     Comment: Social    Drug use: Never    Sexual activity: Yes     Partners: Male     Birth control/protection: Condom       Review of Systems:  The following systems were reviewed as they relate to the cardiovascular system: Constitutional, Eyes, ENT, Cardiovascular, Respiratory, Gastrointestinal, Integumentary, Neurological, Psychiatric, Hematologic, Endocrine, Musculoskeletal, and Genitourinary. The pertinent cardiovascular findings are reported above with all other cardiovascular points within those systems being negative.    Diagnostic Study Review:     Current Electrocardiogram:    ECG 12 Lead    Date/Time: 6/19/2025 5:41 PM  Performed by: Kashif Ramírez DO    Authorized by: Kashif Ramírez DO  Comparison: not compared with previous ECG   Previous ECG: no previous ECG available  Comments: Normal sinus rhythm with a ventricular rate of 73 bpm.  Borderline accelerated AV conduction.  No evidence of preexcitation.  Nonspecific repolarization changes.  Normal QT and prolonged QTc intervals of 446 and 492 ms respectively.  Normal QRS axis.  QT interval is similar to previous tracings.          Laboratory Data:  Lab Results   Component Value Date    GLUCOSE 89 03/21/2025    BUN 15 03/21/2025    CREATININE 1.03 (H) 03/21/2025    EGFRIFNONA 91 02/08/2021    BCR 14.6 03/21/2025    K 3.9 03/21/2025    CO2 28.9 03/21/2025    CALCIUM 9.0 06/16/2025    ALBUMIN 4.1 03/21/2025    AST 20 03/21/2025    ALT 25 03/21/2025     Lab Results   Component Value Date    GLUCOSE 89 03/21/2025    CALCIUM 9.0 06/16/2025     03/21/2025    K 3.9 03/21/2025    CO2 28.9 03/21/2025     03/21/2025     BUN 15 03/21/2025    CREATININE 1.03 (H) 03/21/2025    EGFRIFNONA 91 02/08/2021    BCR 14.6 03/21/2025    ANIONGAP 9.1 03/21/2025     Lab Results   Component Value Date    WBC 5.81 06/16/2025    HGB 13.1 06/16/2025    HCT 40.0 06/16/2025    MCV 95.7 06/16/2025     06/16/2025     Lab Results   Component Value Date    CHOL 167 01/15/2025    TRIG 110 01/15/2025    HDL 46 01/15/2025     (H) 01/15/2025     Lab Results   Component Value Date    HGBA1C 5.60 01/15/2025     Lab Results   Component Value Date    INR 1.01 03/04/2024    INR 1.0 10/18/2017    PROTIME 11.0 03/04/2024    PROTIME 11.1 10/18/2017       Most Recent Echo:  Results for orders placed during the hospital encounter of 01/22/25    Adult Transthoracic Echo Complete W/ Cont if Necessary Per Protocol    Interpretation Summary    Left ventricular ejection fraction appears to be 66 - 70%.    Left ventricular diastolic function is consistent with (grade II w/high LAP) pseudonormalization.    The right ventricular cavity is mildly dilated.    The left atrial cavity is moderately dilated.    Estimated right ventricular systolic pressure from tricuspid regurgitation is mildly elevated (35-45 mmHg). Calculated right ventricular systolic pressure from tricuspid regurgitation is 44 mmHg.       Most Recent Stress Test:  Results for orders placed during the hospital encounter of 02/02/21    Treadmill Stress Test    Interpretation Summary  · No ECG evidence of myocardial ischemia.Negative clinical evidence of myocardial ischemia. Findings consistent with a normal ECG stress test.  · Reached only 81% of the maximal yesterday controlled heart rate secondary to physical limitation       Most Recent Cardiac Catheterization:   No results found for this or any previous visit.       NOTE:     Today,06/19/2025 ,the following portions of the patient's note were reviewed, confirmed and/or updated as appropriate: History of present illness/Interval history,  "physical examination, assessment & plan, allergies, current medications, past family history, past medical history, past social history, past surgical history and problem list.    Labs pertinent to today's visit on 06/19/2025 (including but not limited to CBC, CMP, and lipid profiles) were requested from the patient's primary care provider/hospital/clinical laboratory.  If the labs were available for the visit, they were reviewed with the patient.  If they were not available, when received, special interest will be made to the labs pertinent to this visit.  The patient's most recent \"in-house\" labs are noted below and have been reviewed.  Outside labs pertinent to this visit are scanned into the record and have been reviewed.    Discussions held today, 06/19/2025,regarding procedures included risk, benefits, and options including but not limited to: Death, MI, stroke, pain, bleeding, infection, and possible need for vascular/thoracic/cardiothoracic surgery.    Copied information within this note was reviewed and is current as of 06/19/2025.    Assessment and plan noted herein represents the current plan of care as of 06/19/2025.    Significant resources from our office and staff are inherent in engaging this patient today,06/19/2025,and in a continuous and active collaborative plan of care related to their chronic cardiovascular conditions outlined below.  The management of these conditions requires the direction of our service with specialized clinical knowledge, skills, and experience.  This collaborative care includes but is not limited to patient education, expectations and responsibilities, shared decision making around therapeutic goals, and shared commitments to achieve those goals.  "

## 2025-06-20 LAB
ARSENIC BLD-MCNC: 3 UG/L (ref 0–9)
COPPER SERPL-MCNC: 128 UG/DL (ref 80–158)
LEAD BLDV-MCNC: <1 UG/DL (ref 0–3.4)
MERCURY BLD-MCNC: <1 UG/L (ref 0–14.9)

## 2025-06-23 ENCOUNTER — TELEPHONE (OUTPATIENT)
Dept: NEUROLOGY | Facility: CLINIC | Age: 50
End: 2025-06-23
Payer: COMMERCIAL

## 2025-06-23 NOTE — TELEPHONE ENCOUNTER
Message was sent to provider for the labs.   Faxed the order with auth to [facility] I advised her to call them to get it scheduled

## 2025-06-23 NOTE — TELEPHONE ENCOUNTER
"Provider: MOHIT    Caller: Olga Garcia \"Jazmin\"    Relationship to Patient: Self    Phone Number: [phone]    Reason for Call: PATIENT IS REQUESTING FOR HER REFERRAL TO [facility] TO BE RE-SENT DUE TO THEM NOT RECEIVING IT AND PATIENT STATED THEY ADVISED HER THAT THEY DO NOT REACH  OUT TO THE PATIENTS TO SCHEDULE. PATIENT IS REQUESTING A CALL BACK DIRECTLY TO DISCUSS SOME LAB RESULTS.    PLEASE REVIEW    THANK YOU     "

## 2025-06-24 PROBLEM — R60.0 PERIPHERAL EDEMA: Status: RESOLVED | Noted: 2019-03-14 | Resolved: 2025-06-24

## 2025-06-25 DIAGNOSIS — R41.3 MEMORY LOSS: ICD-10-CM

## 2025-06-25 DIAGNOSIS — E55.9 VITAMIN D DEFICIENCY: Primary | ICD-10-CM

## 2025-06-26 NOTE — TELEPHONE ENCOUNTER
Caller: MAYRA WITH [facility]    Relationship:     Best call back number: [phone]    What is the best time to reach you: ANY    Who are you requesting to speak with (clinical staff, provider,  specific staff member): ANY IF NEEDED    What was the call regarding: STATES THEY NEED NEW ORDERS FOR MRI & AUTH FOR MRI BRAIN WITHOUT CONTRAST ONLY.

## 2025-06-27 LAB
A-TOCOPHEROL VIT E SERPL-MCNC: 11.1 MG/L (ref 7–25.1)
GAMMA TOCOPHEROL SERPL-MCNC: 2.3 MG/L (ref 0.5–5.5)

## 2025-06-27 NOTE — TELEPHONE ENCOUNTER
SPOKE TO [facility] THEY STATED THIS IS FINE, JUST NEEDED TO CONFIRM IT IS FOR [place]. THEY WILL PROCEED

## 2025-06-30 RX ORDER — OMEPRAZOLE 20 MG/1
40 CAPSULE, DELAYED RELEASE ORAL DAILY
Qty: 90 CAPSULE | Refills: 1 | Status: SHIPPED | OUTPATIENT
Start: 2025-06-30

## 2025-07-11 DIAGNOSIS — R60.0 EDEMA LEG: ICD-10-CM

## 2025-07-11 RX ORDER — FUROSEMIDE 40 MG/1
40 TABLET ORAL DAILY
Qty: 30 TABLET | Refills: 2 | Status: SHIPPED | OUTPATIENT
Start: 2025-07-11 | End: 2025-07-14

## 2025-07-13 DIAGNOSIS — R60.0 EDEMA LEG: ICD-10-CM

## 2025-07-14 RX ORDER — FUROSEMIDE 40 MG/1
40 TABLET ORAL DAILY
Qty: 30 TABLET | Refills: 2 | Status: SHIPPED | OUTPATIENT
Start: 2025-07-14 | End: 2025-07-15

## 2025-07-15 DIAGNOSIS — R60.0 EDEMA LEG: ICD-10-CM

## 2025-07-15 RX ORDER — FUROSEMIDE 40 MG/1
40 TABLET ORAL DAILY
Qty: 30 TABLET | Refills: 2 | Status: SHIPPED | OUTPATIENT
Start: 2025-07-15 | End: 2025-07-17

## 2025-07-17 DIAGNOSIS — R60.0 EDEMA LEG: ICD-10-CM

## 2025-07-17 RX ORDER — FUROSEMIDE 40 MG/1
40 TABLET ORAL DAILY
Qty: 30 TABLET | Refills: 2 | Status: SHIPPED | OUTPATIENT
Start: 2025-07-17

## 2025-07-21 ENCOUNTER — OFFICE VISIT (OUTPATIENT)
Dept: FAMILY MEDICINE CLINIC | Facility: CLINIC | Age: 50
End: 2025-07-21
Payer: COMMERCIAL

## 2025-07-21 ENCOUNTER — LAB (OUTPATIENT)
Dept: FAMILY MEDICINE CLINIC | Facility: CLINIC | Age: 50
End: 2025-07-21
Payer: COMMERCIAL

## 2025-07-21 VITALS
HEIGHT: 63 IN | BODY MASS INDEX: 50.75 KG/M2 | HEART RATE: 91 BPM | OXYGEN SATURATION: 96 % | SYSTOLIC BLOOD PRESSURE: 120 MMHG | WEIGHT: 286.4 LBS | DIASTOLIC BLOOD PRESSURE: 74 MMHG

## 2025-07-21 DIAGNOSIS — F41.9 ANXIETY: ICD-10-CM

## 2025-07-21 DIAGNOSIS — R41.3 MEMORY LOSS: ICD-10-CM

## 2025-07-21 DIAGNOSIS — R60.0 BILATERAL LEG EDEMA: Primary | ICD-10-CM

## 2025-07-21 DIAGNOSIS — G89.29 CHRONIC BILATERAL LOW BACK PAIN WITHOUT SCIATICA: ICD-10-CM

## 2025-07-21 DIAGNOSIS — M54.50 CHRONIC BILATERAL LOW BACK PAIN WITHOUT SCIATICA: ICD-10-CM

## 2025-07-21 LAB
ANION GAP SERPL CALCULATED.3IONS-SCNC: 9 MMOL/L (ref 5–15)
BUN SERPL-MCNC: 11 MG/DL (ref 6–20)
BUN/CREAT SERPL: 13.4 (ref 7–25)
CALCIUM SPEC-SCNC: 9 MG/DL (ref 8.6–10.5)
CHLORIDE SERPL-SCNC: 100 MMOL/L (ref 98–107)
CO2 SERPL-SCNC: 27 MMOL/L (ref 22–29)
CREAT SERPL-MCNC: 0.82 MG/DL (ref 0.57–1)
EGFRCR SERPLBLD CKD-EPI 2021: 88.4 ML/MIN/1.73
GLUCOSE SERPL-MCNC: 109 MG/DL (ref 65–99)
POTASSIUM SERPL-SCNC: 4 MMOL/L (ref 3.5–5.2)
SODIUM SERPL-SCNC: 136 MMOL/L (ref 136–145)

## 2025-07-21 PROCEDURE — 36415 COLL VENOUS BLD VENIPUNCTURE: CPT | Performed by: STUDENT IN AN ORGANIZED HEALTH CARE EDUCATION/TRAINING PROGRAM

## 2025-07-21 PROCEDURE — 99213 OFFICE O/P EST LOW 20 MIN: CPT | Performed by: STUDENT IN AN ORGANIZED HEALTH CARE EDUCATION/TRAINING PROGRAM

## 2025-07-21 PROCEDURE — 80048 BASIC METABOLIC PNL TOTAL CA: CPT | Performed by: STUDENT IN AN ORGANIZED HEALTH CARE EDUCATION/TRAINING PROGRAM

## 2025-07-21 RX ORDER — PAROXETINE 20 MG/1
20 TABLET, FILM COATED ORAL EVERY MORNING
COMMUNITY

## 2025-07-21 NOTE — PROGRESS NOTES
"Chief Complaint  Chief Complaint   Patient presents with    Primary Care Follow-Up     Not sure what were following up on. Has some questions she wants to ask.        Subjective        Olga Garcia is a 49 y.o. female who presents to [facility].  History of Present Illness    Jazmin is a 49-year-old female here for multiple concerns.      Anxiety  Recently started seeing a new psychiatrist  Will start her on Paxil as this medication was helpful for her in the past.  She is not sure if it is helping or not yet.        Memory concerns  Recently established with neurology  She states overall the neurologist does not seem especially concerned about any sort of early onset memory loss and thinks a lot of her symptoms are due to anxiety and OCD.  She does have an MRI ordered however it is very expensive and she is not sure if she is going to get this done        Leg swelling  Has been on furosemide 40 mg once daily for the last few months  Overall leg swelling is well-controlled but does have some residual swelling at times          Objective   /74   Pulse 91   Ht 160 cm (62.99\")   Wt 130 kg (286 lb 6.4 oz)   SpO2 96%   BMI 50.75 kg/m²     Estimated body mass index is 50.75 kg/m² as calculated from the following:    Height as of this encounter: 160 cm (62.99\").    Weight as of this encounter: 130 kg (286 lb 6.4 oz).     Physical Exam   GEN: In no acute distress, non toxic appearing  HEENT: EOMI. Mucous membranes moist. Oropharynx without erythema or exudate.   CV: Regular rate and rhythm, no murmurs. Very mild BLE present.  RESP: Lungs clear to auscultation bilaterally.  No signs of respiratory distress on room air.  SKIN: No rashes  MSK: No joint erythema, deformity, or effusion.   NEURO: Alert and appropriate. CN 2-12 intact grossly.        Result Review :              Assessment and Plan     Diagnoses and all orders for this visit:    1. Bilateral leg edema " (Primary)  Chronic, well-controlled condition  Continue furosemide 40 mg once daily  Will update BMP at this time to monitor kidney function and potassium    -     Basic Metabolic Panel    2. Memory loss  Follows with neurology who feels symptoms are most likely related to anxiety and OCD    3. Anxiety  Follows with psychiatry    4. Chronic bilateral low back pain without sciatica  -     Ambulatory Referral to Physical Therapy for Evaluation & Treatment    Other orders  -     Semaglutide-Weight Management 0.25 MG/0.5ML solution auto-injector; Inject 0.5 mL under the skin into the appropriate area as directed 1 (One) Time Per Week. Okay to add B12 for energy support  Dispense: 2 mL; Refill: 0            Follow Up     Return in about 6 months (around 1/21/2026) for Annual physical.

## 2025-08-12 DIAGNOSIS — E55.9 VITAMIN D DEFICIENCY: ICD-10-CM

## 2025-08-13 RX ORDER — ERGOCALCIFEROL 1.25 MG/1
CAPSULE, LIQUID FILLED ORAL
Qty: 8 CAPSULE | Refills: 0 | Status: SHIPPED | OUTPATIENT
Start: 2025-08-13

## 2025-08-20 ENCOUNTER — TREATMENT (OUTPATIENT)
Dept: PHYSICAL THERAPY | Facility: CLINIC | Age: 50
End: 2025-08-20
Payer: COMMERCIAL

## 2025-08-20 DIAGNOSIS — M48.061 SPINAL STENOSIS OF LUMBAR REGION WITHOUT NEUROGENIC CLAUDICATION: ICD-10-CM

## 2025-08-20 DIAGNOSIS — G89.29 CHRONIC MIDLINE LOW BACK PAIN WITHOUT SCIATICA: Primary | ICD-10-CM

## 2025-08-20 DIAGNOSIS — M54.50 CHRONIC MIDLINE LOW BACK PAIN WITHOUT SCIATICA: Primary | ICD-10-CM

## (undated) DEVICE — DGW .035 FC J3MM 150CM TEF HEP: Brand: EMERALD

## (undated) DEVICE — SI AVANTI+ 6F MID W/O GW&OBT: Brand: AVANTI

## (undated) DEVICE — PINNACLE INTRODUCER SHEATH: Brand: PINNACLE

## (undated) DEVICE — PK TRY HEART CATH 50

## (undated) DEVICE — ST ACC MICROPUNCTURE STFF/CANN PLAT/TP 4F 21G 40CM

## (undated) DEVICE — CATH MPAC STP 6F: Brand: SUPER TORQUE

## (undated) DEVICE — GW WHOLEY HITORQ MOD/J .035 145CM